# Patient Record
Sex: FEMALE | Race: WHITE | NOT HISPANIC OR LATINO | Employment: OTHER | URBAN - METROPOLITAN AREA
[De-identification: names, ages, dates, MRNs, and addresses within clinical notes are randomized per-mention and may not be internally consistent; named-entity substitution may affect disease eponyms.]

---

## 2017-03-28 ENCOUNTER — OFFICE VISIT (OUTPATIENT)
Dept: ORTHOPEDICS | Facility: CLINIC | Age: 82
End: 2017-03-28

## 2017-03-28 VITALS — WEIGHT: 159.2 LBS | HEIGHT: 63 IN | BODY MASS INDEX: 28.21 KG/M2

## 2017-03-28 DIAGNOSIS — M17.11 ARTHRITIS OF RIGHT KNEE: Primary | ICD-10-CM

## 2017-03-28 ASSESSMENT — ENCOUNTER SYMPTOMS
SINUS CONGESTION: 1
DOUBLE VISION: 0
BLOATING: 0
BLOOD IN STOOL: 0
VOMITING: 0
ABDOMINAL PAIN: 0
SORE THROAT: 0
HEARTBURN: 0
NECK MASS: 0
BOWEL INCONTINENCE: 0
EYE WATERING: 0
JAUNDICE: 0
CONSTIPATION: 0
EYE PAIN: 0
HOARSE VOICE: 0
RECTAL PAIN: 0
EYE REDNESS: 0
TROUBLE SWALLOWING: 0
SINUS PAIN: 0
TASTE DISTURBANCE: 0
RECTAL BLEEDING: 0
NAUSEA: 0
EYE IRRITATION: 0
SMELL DISTURBANCE: 0
DIARRHEA: 1

## 2017-03-28 NOTE — PROGRESS NOTES
HISTORY OF PRESENT ILLNESS:  Patient is a pleasant 87-year-old female who I last saw on 11/2015, 17 months prior to today.  She carries a diagnosis of a valgus deformity of her right knee.  She is here because her daughter-in-law felt that we should reconsider the possibility of doing surgery because she is now in good health and does not want to do surgery later on when she is not in good health.      We reviewed her current situation; her range of motions of this right knee is 15 degrees to 135.  She reports her pain level as 0-1.  She [place] in a [place] rise which she takes the elevator.  She walks around the house with no ambulatory aid.  She does not walk outside with an ambulatory aid other than somebody's arm when needed.      She recently came back from a bird watching experiencing.  When asked if she had trouble on uneven ground, she said when she did, she just grabbed her son's arm.  In addition to this recent trip to [place], she was in [facility] a few weeks prior to that.  In January, she was down in [place] and then further travel to [place].  In December she was in [place].      She freely navigates airplanes, airports and car travel without problems.  She walks on level ground without problems.  Typically when she goes up and down steps, she uses a handrail, but feels that if she has a hand rail, she does not have any difficulty.  She does have confidence in her knee to the extent that it does not interfere with her life.  She has not experienced any falls.      Again, her range of motion is 15 degrees to 135.  She had a valgus deformity which is not passively correctable.  On standing alignment, she has a valgus alignment measuring 13 degrees of anatomic valgus.      I believe that overall I do not have much that I could offer her in regards to improvement in quality of life at this point in time.  Certainly, at her age there are things that could go wrong with surgery or she could end up with a  stiff knee and have less flexibility.      I believe that if she starts to show less confidence in her knee, and/or if she should begin to have falls, then we could re-evaluate the situation.  She also reports that if she started to have a fear of falling, she probably would just use a walker or a  crutch or a cane, none of which are a daily habit for her.      I do not think it would be of value to repeat x-rays at this point in time.      She will return to see if she has increased pain or increased fear of falling.  At that time we could repeat standing alignment films and PA views.  We do not need to have a lateral or an axial view repeated.      Room time 15 minutes, consultation time 12.       Answers for HPI/ROS submitted by the patient on 3/28/2017   General Symptoms: No  Skin Symptoms: No  HENT Symptoms: Yes  EYE SYMPTOMS: Yes  HEART SYMPTOMS: No  LUNG SYMPTOMS: No  INTESTINAL SYMPTOMS: Yes  URINARY SYMPTOMS: No  GYNECOLOGIC SYMPTOMS: No  BREAST SYMPTOMS: No  SKELETAL SYMPTOMS: No  BLOOD SYMPTOMS: No  NERVOUS SYSTEM SYMPTOMS: No  MENTAL HEALTH SYMPTOMS: No  Ear pain: No  Ear discharge: No  Hearing loss: Yes  Tinnitus: No  Nosebleeds: No  Congestion: Yes  Sinus pain: No  Trouble swallowing: No   Voice hoarseness: No  Mouth sores: No  Sore throat: No  Tooth pain: No  Gum tenderness: No  Bleeding gums: No  Change in taste: No  Change in sense of smell: No  Dry mouth: No  Hearing aid used: Yes  Neck lump: No  Eye pain: No  Vision loss: No  Dry eyes: No  Watery eyes: No  Eye bulging: No  Double vision: No  Flashing of lights: No  Spots: No  Floaters: Yes  Redness: No  Crossed eyes: No  Tunnel Vision: No  Yellowing of eyes: No  Eye irritation: No  Heart burn or indigestion: No  Nausea: No  Vomiting: No  Abdominal pain: No  Bloating: No  Constipation: No  Diarrhea: Yes  Blood in stool: No  Black stools: No  Rectal or Anal pain: No  Fecal incontinence: No  Rectal bleeding: No  Yellowing of skin or eyes: No  Vomit  with blood: No  Change in stools: Yes

## 2017-03-28 NOTE — NURSING NOTE
"Reason For Visit:   Chief Complaint   Patient presents with     RECHECK     right knee, wants to discuss surgery       ? No  Occupation retired.  Currently working? No.  Work status? Retired.  Date of injury:   Type of injury:   Date of surgery:   Type of surgery: .  Smoker: No  Request smoking cessation information: No        Pain Assessment  Patient Currently in Pain: Yes  Patient's Stated Pain Goal: No pain  0-10 Pain Scale: 1  Primary Pain Location: Knee  Pain Orientation: Right  Pain Descriptors: Aching  Alleviating Factors: Rest  Aggravating Factors: Stairs    Ht 1.607 m (5' 3.25\")  Wt 72.2 kg (159 lb 3.2 oz)  BMI 27.98 kg/m2       Allergies   Allergen Reactions     Penicillin G      Red and inflamed     Current Outpatient Prescriptions   Medication     aspirin 81 MG tablet     calcium-vitamin D (CALTRATE) 600-400 MG-UNIT per tablet     Alendronate Sodium (FOSAMAX PO)     VITAMIN D, CHOLECALCIFEROL, PO     multivitamin, therapeutic (THERA-VIT) TABS     No current facility-administered medications for this visit.                                                       "

## 2017-03-28 NOTE — MR AVS SNAPSHOT
"              After Visit Summary   3/28/2017    Hailey Alexis    MRN: [id number]           Patient Information     Date Of Birth          2/10/1930        Visit Information        Provider Department      3/28/2017 3:15 PM Hailey Gray MD [facility]        Today's Diagnoses     Arthritis of right knee    -  1       Follow-ups after your visit        Who to contact     Please call your clinic at [phone] to:    Ask questions about your health    Make or cancel appointments    Discuss your medicines    Learn about your test results    Speak to your doctor   If you have compliments or concerns about an experience at your clinic, or if you wish to file a complaint, please contact [facility] Patient Relations at [phone] or email us at [email or web address]         Additional Information About Your Visit        MyChart Information     [employer] [place] is an electronic gateway that provides easy, online access to your medical records. With [employer], you can request a clinic appointment, read your test results, renew a prescription or communicate with your care team.     To sign up for [employer] visit the website at [email or web address]   You will be asked to enter the access code listed below, as well as some personal information. Please follow the directions to create your username and password.     Your access code is: [email or web address]  Expires: 2017  6:30 AM     Your access code will  in 90 days. If you need help or a new code, please contact your [facility] or call [phone] for assistance.        Care EveryWhere ID     This is your Care EveryWhere ID. This could be used by other organizations to access your [place] medical records  [id number]        Your Vitals Were     Height BMI (Body Mass Index)                1.607 m (5' 3.25\") 27.98 kg/m2           Blood Pressure from Last 3 Encounters:   No data " found for BP    Weight from Last 3 Encounters:   No data found for Wt              Today, you had the following     No orders found for display       Primary Care Provider    None Specified       No primary provider on file.        Thank you!     Thank you for choosing [facility]  for your care. Our goal is always to provide you with excellent care. Hearing back from our patients is one way we can continue to improve our services. Please take a few minutes to complete the written survey that you may receive in the mail after your visit with us. Thank you!             Your Updated Medication List - Protect others around you: Learn how to safely use, store and throw away your medicines at www.disposemymeds.org.          This list is accurate as of: 3/28/17 11:59 PM.  Always use your most recent med list.                   Brand Name Dispense Instructions for use    aspirin 81 MG tablet      Take 81 mg by mouth daily       calcium-vitamin D 600-400 MG-UNIT per tablet    CALTRATE     Take 2 tablets by mouth daily       FOSAMAX PO      Take 70 mg by mouth once a week       multivitamin, therapeutic Tabs tablet      Take 1 tablet by mouth daily       VITAMIN D (CHOLECALCIFEROL) PO      Take 1,000 Units by mouth daily

## 2018-06-05 ENCOUNTER — RADIANT APPOINTMENT (OUTPATIENT)
Dept: GENERAL RADIOLOGY | Facility: CLINIC | Age: 83
End: 2018-06-05
Attending: ORTHOPAEDIC SURGERY
Payer: COMMERCIAL

## 2018-06-05 ENCOUNTER — OFFICE VISIT (OUTPATIENT)
Dept: ORTHOPEDICS | Facility: CLINIC | Age: 83
End: 2018-06-05
Payer: COMMERCIAL

## 2018-06-05 ENCOUNTER — TELEPHONE (OUTPATIENT)
Dept: ORTHOPEDICS | Facility: CLINIC | Age: 83
End: 2018-06-05

## 2018-06-05 DIAGNOSIS — M25.561 CHRONIC PAIN OF RIGHT KNEE: ICD-10-CM

## 2018-06-05 DIAGNOSIS — M25.561 CHRONIC PAIN OF RIGHT KNEE: Primary | ICD-10-CM

## 2018-06-05 DIAGNOSIS — M17.11 ARTHRITIS OF RIGHT KNEE: ICD-10-CM

## 2018-06-05 DIAGNOSIS — G89.29 CHRONIC PAIN OF RIGHT KNEE: Primary | ICD-10-CM

## 2018-06-05 DIAGNOSIS — G89.29 CHRONIC PAIN OF RIGHT KNEE: ICD-10-CM

## 2018-06-05 RX ORDER — TRIAMCINOLONE ACETONIDE 40 MG/ML
40 INJECTION, SUSPENSION INTRA-ARTICULAR; INTRAMUSCULAR ONCE
Qty: 1 ML | Refills: 0 | OUTPATIENT
Start: 2018-06-05 | End: 2018-06-05

## 2018-06-05 RX ORDER — LIDOCAINE HYDROCHLORIDE 10 MG/ML
9 INJECTION, SOLUTION INFILTRATION; PERINEURAL ONCE
Qty: 9 ML | Refills: 0 | OUTPATIENT
Start: 2018-06-05 | End: 2018-06-05

## 2018-06-05 RX ORDER — ANTIOX #8/OM3/DHA/EPA/LUT/ZEAX 250-2.5 MG
1 CAPSULE ORAL 2 TIMES DAILY
COMMUNITY
End: 2024-09-25

## 2018-06-05 ASSESSMENT — ENCOUNTER SYMPTOMS
FEVER: 0
FATIGUE: 0
DECREASED APPETITE: 0
BACK PAIN: 0
MUSCLE WEAKNESS: 0
MYALGIAS: 1
POLYDIPSIA: 0
STIFFNESS: 0
ARTHRALGIAS: 1
INCREASED ENERGY: 0
WEIGHT LOSS: 0
POLYPHAGIA: 0
NECK PAIN: 0
MUSCLE CRAMPS: 1
JOINT SWELLING: 0
WEIGHT GAIN: 0
NIGHT SWEATS: 0
ALTERED TEMPERATURE REGULATION: 0
HALLUCINATIONS: 0
CHILLS: 0

## 2018-06-05 NOTE — TELEPHONE ENCOUNTER
Returned patient's call after discussing with Dr. Gray patient's request for injection.  Dr. Gray is willing to see patient today at 1:30 but did explain that this may not relieve her pain. Patient aware and she is willing to try this. Patient scheduled.

## 2018-06-05 NOTE — PROGRESS NOTES
"Orthopedic Surgery Clinic    CHIEF COMPLAINT: Right knee pain, chronic    HISTORY OF PRESENT ILLNESS: 88-year-old female returns to clinic to follow-up right knee pain.  She was last seen in our clinic in March 20, 2017 where overall, she is doing well, had motion approximately  .  She returns today stating that, over the past 2 days she had significant diffuse right knee pain which is now resolved.  She denies any antecedent trauma.  She describes it as her baseline aching/soreness which is activity related, diffusely throughout her right knee, somewhat improved by rest.  She states over the past day, the pain has significantly improved however, she notes she is going on a trip to [place] in 2 days, and wishes for corticosteroid injection so that her knee pain does not limit her activity on her vacation.  Otherwise, she has no other questions or concerns.  She briefly mentions that she is considering surgery as she would like to proceed while she is still \"healthy\".  denies new numbness/tingling/weakness, denies fevers, chills, sob, cp.      REVIEW OF SYSTEMS:  10 point review of systems negative unless noted in HPI    PHYSICAL EXAM:  No acute distress    Right lower extremity: Toes warm well perfused, dorsalis pedis pulse 2+, sensation intact light touch throughout, 5/5 EHL, FHL, GSC, TA, mild tenderness to palpation lateral joint line, no tenderness palpation medially and posteriorly, right knee range of motion 4  to 137 , painless, moderate crepitus, valgus passively corrects, stable to varus valgus stress, stable to AP stress, walks with a right antalgic gait, grossly valgus on exam    IMAGING: Reviewed, imaging demonstrates valgus of right knee measuring approximately 60  on alignment films today, accounting for roughly 1  of progression from her prior long leg alignment films, her arthritis is mostly pronounced at the lateral compartment where it is bone-on-bone, moderate to advanced at the medial " compartment    A/P: 88 year old female with moderate to advanced osteoarthritis of the right knee.    We had an extensive discussion with this patient about further medical management as well as surgical management.  Given that she is leaving for comparison 2 days, we do agree with proceeding with right knee corticosteroid injection.  Wrist benefits alternatives were discussed.    Procedure note: Right knee corticosteroid injection  Verbal and written consent obtained.  Site and side verified with patient.  Risks and benefits alternatives reviewed.  Utilizing a 22-gauge needle, 40 mg of Kenalog and 5 cc 1% lidocaine without epinephrine are injected into the right knee without complication.  Patient tolerated procedure well.  The injection was done by Dr. Gray    Patient will be weightbearing as tolerated, activity as tolerated, avoid activities that cause her pain, follow-up in clinic in July to discuss further medical management versus total knee arthroplasty.    RT: 15 min, CT: 12 min,    Adalid Peng MD  Orthopedic Surgery, PGY4  [phone]    I have personally examined this patient and have reviewed the clinical presentation and progress note with the resident.  I agree with the treatment plan as outlined.  The plan was formulated with the resident on the day of the resident dictation.    Hailey Gray      Answers for HPI/ROS submitted by the patient on 6/5/2018   General Symptoms: Yes  Skin Symptoms: No  HENT Symptoms: No  EYE SYMPTOMS: No  HEART SYMPTOMS: No  LUNG SYMPTOMS: No  INTESTINAL SYMPTOMS: No  URINARY SYMPTOMS: No  GYNECOLOGIC SYMPTOMS: No  BREAST SYMPTOMS: No  SKELETAL SYMPTOMS: Yes  BLOOD SYMPTOMS: No  NERVOUS SYSTEM SYMPTOMS: No  MENTAL HEALTH SYMPTOMS: No  Fever: No  Loss of appetite: No  Weight loss: No  Weight gain: No  Fatigue: No  Night sweats: No  Chills: No  Increased stress: Yes  Excessive hunger: No  Excessive thirst: No  Feeling hot or cold when others believe the temperature  is normal: No  Loss of height: No  Post-operative complications: No  Surgical site pain: Yes  Hallucinations: No  Change in or Loss of Energy: No  Hyperactivity: No  Confusion: No  Back pain: No  Muscle aches: Yes  Neck pain: No  Swollen joints: No  Joint pain: Yes  Bone pain: Yes  Muscle cramps: Yes  Muscle weakness: No  Joint stiffness: No  Bone fracture: No

## 2018-06-05 NOTE — NURSING NOTE
Reason For Visit:   Chief Complaint   Patient presents with     RECHECK     Possible right knee injection       Primary MD: No primary care provider on file.  Referring MD: Self    ?  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  Date of injury: ongoing    Date of surgery: NA  Type of surgery: NA.  Smoker: No  Request smoking cessation information: No    There were no vitals taken for this visit.    Pain Assessment  Patient Currently in Pain: [facility]  [address] [facility] [address]  Dept: [phone]  ______________________________________________________________________________    Patient: Hailey Alexis   : 2/10/1930   MRN: [id number]   2018    INVASIVE PROCEDURE SAFETY CHECKLIST    Date: 2018   Procedure:Right knee injection  Patient Name: Hailey Alexis  MRN: [id number]  YOB: 1930    Action: Complete sections as appropriate. Any discrepancy results in a HARD COPY until resolved.     PRE PROCEDURE:  Patient ID verified with 2 identifiers (name and  or MRN): Yes  Procedure and site verified with patient/designee (when able): Yes  Accurate consent documentation in medical record: Yes  H&P (or appropriate assessment) documented in medical record: NA  H&P must be up to 20 days prior to procedure and updates within 24 hours of procedure as applicable: NA  Relevant diagnostic and radiology test results appropriately labeled and displayed as applicable: Yes  Procedure site(s) marked with provider initials: NA    TIMEOUT:  Time-Out performed immediately prior to starting procedure, including verbal and active participation of all team members addressing the following:Yes  * Correct patient identify  * Confirmed that the correct side and site are marked  * An accurate procedure consent form  * Agreement on the procedure to be done  * Correct patient position  * Relevant images and  results are properly labeled and appropriately displayed  * The need to administer antibiotics or fluids for irrigation purposes during the procedure as applicable   * Safety precautions based on patient history or medication use    DURING PROCEDURE: Verification of correct person, site, and procedures any time the responsibility for care of the patient is transferred to another member of the care team.       The following medications were given:     MEDICATION:  Kenalog 40 mg  ROUTE: intra articular  SITE: right knee  DOSE: 1 ml  LOT #: [id number]  : [employer]  EXPIRATION DATE: 01/2020  NDC#: [address]-1   Was there drug waste? No    MEDICATION:  Lidocaine without epinephrine  ROUTE: intra articular  SITE: right knee  DOSE: 9 ml  LOT #: [id number]  : [employer]  EXPIRATION DATE: 04/22  NDC#: 10612-226-58   Was there drug waste? Yes  Amount of drug waste (mL): 21.  Reason for waste:  Single use vial      Claudine Nogueira ATC  June 5, 2018        Teaching Flowsheet   Relevant Diagnosis: Right knee pain/arthritis  Teaching Topic: Right knee steroid injection      Person(s) involved in teaching:   Patient     Motivation Level:  Asks Questions: Yes  Eager to Learn: Yes  Cooperative: Yes  Receptive (willing/able to accept information): Yes  Any cultural factors/Sikh beliefs that may influence understanding or compliance? No       Patient demonstrates understanding of the following:  Reason for the appointment, diagnosis and treatment plan: Yes  Knowledge of proper use of medications and conditions for which they are ordered (with special attention to potential side effects or drug interactions): Yes  Which situations necessitate calling provider and whom to contact: Yes       Teaching Concerns Addressed:        Proper use and care of NA (medical equip, care aids, etc.): NA  Nutritional needs and diet plan: NA  Pain management techniques: NA  Wound Care: NA  How and/when to access  community resources: NA     Instructional Materials Used/Given: verbal    Claudine Nogueira, ATC

## 2018-06-05 NOTE — MR AVS SNAPSHOT
After Visit Summary   2018    Hailey Alexis    MRN: [id number]           Patient Information     Date Of Birth          2/10/1930        Visit Information        Provider Department      2018 1:30 PM Hailey Gray MD [facility]        Today's Diagnoses     Chronic pain of right knee    -  1    Arthritis of right knee           Follow-ups after your visit        Follow-up notes from your care team     Return in about 1 month (around 2018).      Your next 10 appointments already scheduled     2018 11:30 AM CDT   (Arrive by 11:15 AM)   RETURN KNEE with Hailey Gray MD   [facility] ([facility])    [address] [address] [phone]              Who to contact     Please call your clinic at [phone] to:    Ask questions about your health    Make or cancel appointments    Discuss your medicines    Learn about your test results    Speak to your doctor            Additional Information About Your Visit        MyChart Information     Pressgram is an electronic gateway that provides easy, online access to your medical records. With Pressgram, you can request a clinic appointment, read your test results, renew a prescription or communicate with your care team.     To sign up for [employer] visit the website at [email or web address]   You will be asked to enter the access code listed below, as well as some personal information. Please follow the directions to create your username and password.     Your access code is: [email or web address]  Expires: 2018  1:14 PM     Your access code will  in 90 days. If you need help or a new code, please contact your [facility] or call [phone] for assistance.        Care EveryWhere ID     This is your Care EveryWhere ID. This could be used by other organizations to access your [facility]  records  [id number]         Blood Pressure from Last 3 Encounters:   No data found for BP    Weight from Last 3 Encounters:   03/28/17 72.2 kg (159 lb 3.2 oz)   11/03/15 71.1 kg (156 lb 12.8 oz)              We Performed the Following     Large Joint/Bursa injection and/or drainage - Unilateral (Shoulder, Knee) [27000]          Today's Medication Changes          These changes are accurate as of 6/5/18 11:59 PM.  If you have any questions, ask your nurse or doctor.               Start taking these medicines.        Dose/Directions    lidocaine 1 % injection   Used for:  Arthritis of right knee   Started by:  Hailey Gray MD        Dose:  9 mL   9 mLs by INTRA-ARTICULAR route once for 1 dose   Quantity:  9 mL   Refills:  0       triamcinolone acetonide 40 MG/ML injection   Commonly known as:  KENALOG   Used for:  Arthritis of right knee   Started by:  Hailey Gray MD        Dose:  40 mg   1 mL (40 mg) by INTRA-ARTICULAR route once for 1 dose   Quantity:  1 mL   Refills:  0            Where to get your medicines      Some of these will need a paper prescription and others can be bought over the counter.  Ask your nurse if you have questions.     You don't need a prescription for these medications     lidocaine 1 % injection    triamcinolone acetonide 40 MG/ML injection                Primary Care Provider    None Specified       No primary provider on file.        Equal Access to Services     [facility]: Hadii ryne Santos, waaxda luqadaha, qaybta kaalmada adeeri, sunni galloway . So [facility] [phone].    ATENCIÓN: Si habla español, tiene a espinoza disposición servicios gratuitos de asistencia lingüística. Llame al [phone].    We comply with applicable federal civil rights laws and [place] laws. We do not discriminate on the basis of race, color, national origin, age, disability, sex, sexual orientation, or gender identity.            Thank you!     Thank  you for choosing [facility]  for your care. Our goal is always to provide you with excellent care. Hearing back from our patients is one way we can continue to improve our services. Please take a few minutes to complete the written survey that you may receive in the mail after your visit with us. Thank you!             Your Updated Medication List - Protect others around you: Learn how to safely use, store and throw away your medicines at www.disposemymeds.org.          This list is accurate as of 6/5/18 11:59 PM.  Always use your most recent med list.                   Brand Name Dispense Instructions for use Diagnosis    aspirin 81 MG tablet      Take 81 mg by mouth daily        calcium-vitamin D 600-400 MG-UNIT per tablet    CALTRATE     Take 2 tablets by mouth daily        FOSAMAX PO      Take 70 mg by mouth once a week        lidocaine 1 % injection     9 mL    9 mLs by INTRA-ARTICULAR route once for 1 dose    Arthritis of right knee       multivitamin, therapeutic Tabs tablet      Take 1 tablet by mouth daily        PRESERVISION AREDS 2 Caps           triamcinolone acetonide 40 MG/ML injection    KENALOG    1 mL    1 mL (40 mg) by INTRA-ARTICULAR route once for 1 dose    Arthritis of right knee       VITAMIN D (CHOLECALCIFEROL) PO      Take 1,000 Units by mouth daily

## 2018-06-05 NOTE — TELEPHONE ENCOUNTER
[facility] Call Center    Phone Message    May a detailed message be left on voicemail: yes    Reason for Call: Other: Pt wants to get in this week for a rt knee inj. before leaving [place]. Nothing in sports until next friday. Nothing for Dr. Gray until August.      Action Taken: Message routed to:  [facility] Orthopedics

## 2018-06-05 NOTE — LETTER
"6/5/2018     RE: Hailey Alexis  [address]  [facility] [address]     Dear Colleague,    Thank you for referring your patient, Hailey Alexis, to the [facility] at [facility]. Please see a copy of my visit note below.    Orthopedic Surgery Clinic    CHIEF COMPLAINT: Right knee pain, chronic    HISTORY OF PRESENT ILLNESS: 88-year-old female returns to clinic to follow-up right knee pain.  She was last seen in our clinic in March 20, 2017 where overall, she is doing well, had motion approximately  .  She returns today stating that, over the past 2 days she had significant diffuse right knee pain which is now resolved.  She denies any antecedent trauma.  She describes it as her baseline aching/soreness which is activity related, diffusely throughout her right knee, somewhat improved by rest.  She states over the past day, the pain has significantly improved however, she notes she is going on a trip to [place] in 2 days, and wishes for corticosteroid injection so that her knee pain does not limit her activity on her vacation.  Otherwise, she has no other questions or concerns.  She briefly mentions that she is considering surgery as she would like to proceed while she is still \"healthy\".  denies new numbness/tingling/weakness, denies fevers, chills, sob, cp.      REVIEW OF SYSTEMS:  10 point review of systems negative unless noted in HPI    PHYSICAL EXAM:  No acute distress    Right lower extremity: Toes warm well perfused, dorsalis pedis pulse 2+, sensation intact light touch throughout, 5/5 EHL, FHL, GSC, TA, mild tenderness to palpation lateral joint line, no tenderness palpation medially and posteriorly, right knee range of motion 4  to 137 , painless, moderate crepitus, valgus passively corrects, stable to varus valgus stress, stable to AP stress, walks with a right antalgic gait, grossly valgus on exam    IMAGING: Reviewed, imaging " demonstrates valgus of right knee measuring approximately 60  on alignment films today, accounting for roughly 1  of progression from her prior long leg alignment films, her arthritis is mostly pronounced at the lateral compartment where it is bone-on-bone, moderate to advanced at the medial compartment    A/P: 88 year old female with moderate to advanced osteoarthritis of the right knee.    We had an extensive discussion with this patient about further medical management as well as surgical management.  Given that she is leaving for [place] 2 days, we do agree with proceeding with right knee corticosteroid injection.  Wrist benefits alternatives were discussed.    Procedure note: Right knee corticosteroid injection  Verbal and written consent obtained.  Site and side verified with patient.  Risks and benefits alternatives reviewed.  Utilizing a 22-gauge needle, 40 mg of Kenalog and 5 cc 1% lidocaine without epinephrine are injected into the right knee without complication.  Patient tolerated procedure well.  The injection was done by Dr. Gray    Patient will be weightbearing as tolerated, activity as tolerated, avoid activities that cause her pain, follow-up in clinic in July to discuss further medical management versus total knee arthroplasty.    RT: 15 min, CT: 12 min,    Adalid Peng MD  Orthopedic Surgery, PGY4  [phone]    I have personally examined this patient and have reviewed the clinical presentation and progress note with the resident.  I agree with the treatment plan as outlined.  The plan was formulated with the resident on the day of the resident dictation.    Hailey Gray      Answers for HPI/ROS submitted by the patient on 6/5/2018   General Symptoms: Yes  Skin Symptoms: No  HENT Symptoms: No  EYE SYMPTOMS: No  HEART SYMPTOMS: No  LUNG SYMPTOMS: No  INTESTINAL SYMPTOMS: No  URINARY SYMPTOMS: No  GYNECOLOGIC SYMPTOMS: No  BREAST SYMPTOMS: No  SKELETAL SYMPTOMS: Yes  BLOOD SYMPTOMS:  No  NERVOUS SYSTEM SYMPTOMS: No  MENTAL HEALTH SYMPTOMS: No  Fever: No  Loss of appetite: No  Weight loss: No  Weight gain: No  Fatigue: No  Night sweats: No  Chills: No  Increased stress: Yes  Excessive hunger: No  Excessive thirst: No  Feeling hot or cold when others believe the temperature is normal: No  Loss of height: No  Post-operative complications: No  Surgical site pain: Yes  Hallucinations: No  Change in or Loss of Energy: No  Hyperactivity: No  Confusion: No  Back pain: No  Muscle aches: Yes  Neck pain: No  Swollen joints: No  Joint pain: Yes  Bone pain: Yes  Muscle cramps: Yes  Muscle weakness: No  Joint stiffness: No  Bone fracture: No    Again, thank you for allowing me to participate in the care of your patient.      Sincerely,    Hailey Gray MD

## 2018-07-31 ENCOUNTER — OFFICE VISIT (OUTPATIENT)
Dept: ORTHOPEDICS | Facility: CLINIC | Age: 83
End: 2018-07-31
Payer: COMMERCIAL

## 2018-07-31 VITALS — BODY MASS INDEX: 26.48 KG/M2 | HEIGHT: 64 IN | WEIGHT: 155.1 LBS

## 2018-07-31 DIAGNOSIS — M17.11 PRIMARY OSTEOARTHRITIS OF RIGHT KNEE: Primary | ICD-10-CM

## 2018-07-31 DIAGNOSIS — M17.11 ARTHRITIS OF RIGHT KNEE: ICD-10-CM

## 2018-07-31 NOTE — PROGRESS NOTES
S: Blanca is an 88-year-old female who presents today for follow-up after right knee injection 7 weeks ago.  After having the injection she had significant pain relief and was able to travel in [place] without difficulty.  She did have some soreness at the end of long days of walking but otherwise did very well.  This was her first injection into the knee.  She denies any sensation of instability and feels that she can trust her knee at this time.  The cortisone shot is still working well.  She reports no numbness tingling in the right lower extremity no increased swelling, fevers, chills.     Physical exam: generally well-appearing in no acute distress    Exam of the right knee: tolerates range of motion from 0-120  without pain, she has no crepitus she is stable to varus, valgus, as well as anterior and posterior drawer testing.     Review of her x-rays: from June show lateral sided arthritis of the right knee with valgus alignment 13 .  She has minimal joint space narrowing in the medial compartment and patellofemoral compartment.     Assessment and plan:  88-year-old female with right valgus knee, osteoarthritis of lateral compartment.   Discussed that since this first corticosteroid injection worked well she could continue to get them every 3 months.   We did discuss that given her deformity she may develop instability symptoms.  This in combination or aside from pain may be a reason to pursue total knee arthroplasty in the future.  Today we will give her a prescription for physical therapy to strengthen her right knee.  Additionally we her information so that she can attend the joint education class to learn more about total knee arthroplasty  She is welcome to come in to the walk-in clinic for future knee injections but please notify Dr. Gray to assess future plans.    I have personally examined this patient and have reviewed the clinical presentation and progress note with the resident.  I agree with the  treatment plan as outlined.  The plan was formulated with the resident on the day of the resident dictation.      Hailey Shane MD   PGY-4 Orthopaedic Surgery     Answers for HPI/ROS submitted by the patient on 7/22/2018   General Symptoms: No  Skin Symptoms: No  HENT Symptoms: No  EYE SYMPTOMS: No  HEART SYMPTOMS: No  LUNG SYMPTOMS: No  INTESTINAL SYMPTOMS: No  URINARY SYMPTOMS: No  GYNECOLOGIC SYMPTOMS: No  BREAST SYMPTOMS: No  SKELETAL SYMPTOMS: No  BLOOD SYMPTOMS: No  NERVOUS SYSTEM SYMPTOMS: No  MENTAL HEALTH SYMPTOMS: No  PHQ-2 Score: 1

## 2018-07-31 NOTE — LETTER
7/31/2018       RE: Hailey Alexis  [address]  [facility] [address]     Dear Colleague,    Thank you for referring your patient, Hailey Alexis, [facility]. Please see a copy of my visit note below.    S: Blanca is an 88-year-old female who presents today for follow-up after right knee injection 7 weeks ago.  After having the injection she had significant pain relief and was able to travel in [place] without difficulty.  She did have some soreness at the end of long days of walking but otherwise did very well.  This was her first injection into the knee.  She denies any sensation of instability and feels that she can trust her knee at this time.  The cortisone shot is still working well.  She reports no numbness tingling in the right lower extremity no increased swelling, fevers, chills.     Physical exam: generally well-appearing in no acute distress    Exam of the right knee: tolerates range of motion from 0-120  without pain, she has no crepitus she is stable to varus, valgus, as well as anterior and posterior drawer testing.     Review of her x-rays: from June show lateral sided arthritis of the right knee with valgus alignment 13  .  She has minimal joint space narrowing in the medial compartment and patellofemoral compartment.     Assessment and plan:  88-year-old female with right valgus knee, osteoarthritis of lateral compartment.   Discussed that since this first corticosteroid injection worked well she could continue to get them every 3 months.   We did discuss that given her deformity she may develop instability symptoms.  This in combination or aside from pain may be a reason to pursue total knee arthroplasty in the future.  Today we will give her a prescription for physical therapy to strengthen her right knee.  Additionally we her information so that she can attend the joint education class to learn more about  total knee arthroplasty  She is welcome to come in to the walk-in clinic for future knee injections but please notify Dr. Gray to assess future plans.    I have personally examined this patient and have reviewed the clinical presentation and progress note with the resident.  I agree with the treatment plan as outlined.  The plan was formulated with the resident on the day of the resident dictation.      Hailey Shane MD   PGY-4 Orthopaedic Surgery

## 2018-07-31 NOTE — MR AVS SNAPSHOT
"              After Visit Summary   7/31/2018    Hailey Alexis    MRN: [id number]           Patient Information     Date Of Birth          2/10/1930        Visit Information        Provider Department      7/31/2018 11:30 AM Hailey Gray [facility]        Today's Diagnoses     Primary osteoarthritis of right knee    -  1    Arthritis of right knee           Follow-ups after your visit        Additional Services     PHYSICAL THERAPY REFERRAL (Internal)       Physical Therapy Referral    Schedule with either Luis E LANG or Ronnell Bell                  Follow-up notes from your care team     Return if symptoms worsen or fail to improve.      Who to contact     Please call your clinic at [phone] to:    Ask questions about your health    Make or cancel appointments    Discuss your medicines    Learn about your test results    Speak to your doctor            Additional Information About Your Visit        MyChart Information     Real[employer] gives you secure access to your electronic health record. If you see a primary care provider, you can also send messages to your care team and make appointments. If you have questions, please call your primary care clinic.  If you do not have a primary care provider, please call [phone] and they will assist you.      Realie is an electronic gateway that provides easy, online access to your medical records. With Real[employer], you can request a clinic appointment, read your test results, renew a prescription or communicate with your care team.     To access your existing account, please contact your [facility] or call [phone] for assistance.        Care EveryWhere ID     This is your Care EveryWhere ID. This could be used by other organizations to access your [facility] medical records  [id number]        Your Vitals Were     Height BMI (Body Mass Index)                1.626 m (5' 4\") 26.62 kg/m2           Blood Pressure from " Last 3 Encounters:   No data found for BP    Weight from Last 3 Encounters:   07/31/18 70.4 kg (155 lb 1.6 oz)   03/28/17 72.2 kg (159 lb 3.2 oz)   11/03/15 71.1 kg (156 lb 12.8 oz)              We Performed the Following     PHYSICAL THERAPY REFERRAL (Internal)        Primary Care Provider    None Specified       No primary provider on file.        Equal Access to Services     [facility]: Hadii ryne ku hadalejandrinao Socalixtoali, waaxda luqadaha, qaybta kaalmada dequanfinada, sunni raymond kristinaradha estradadevinkiran galloway . So [facility] [phone].    ATENCIÓN: Si habla valery, tiene a espinoza disposición servicios gratuitos de asistencia lingüística. Llame al [phone].    We comply with applicable federal civil rights laws and [place] laws. We do not discriminate on the basis of race, color, national origin, age, disability, sex, sexual orientation, or gender identity.            Thank you!     Thank you for choosing [facility]  for your care. Our goal is always to provide you with excellent care. Hearing back from our patients is one way we can continue to improve our services. Please take a few minutes to complete the written survey that you may receive in the mail after your visit with us. Thank you!             Your Updated Medication List - Protect others around you: Learn how to safely use, store and throw away your medicines at www.disposemymeds.org.          This list is accurate as of 7/31/18  7:17 PM.  Always use your most recent med list.                   Brand Name Dispense Instructions for use Diagnosis    aspirin 81 MG tablet      Take 81 mg by mouth daily        calcium-vitamin D 600-400 MG-UNIT per tablet    CALTRATE     Take 2 tablets by mouth daily        FOSAMAX PO      Take 70 mg by mouth once a week        multivitamin, therapeutic Tabs tablet      Take 1 tablet by mouth daily        PRESERVISION AREDS 2 Caps           VITAMIN D (CHOLECALCIFEROL) PO      Take 1,000 Units by mouth daily

## 2018-08-15 ENCOUNTER — THERAPY VISIT (OUTPATIENT)
Dept: PHYSICAL THERAPY | Facility: CLINIC | Age: 83
End: 2018-08-15
Attending: ORTHOPAEDIC SURGERY
Payer: COMMERCIAL

## 2018-08-15 DIAGNOSIS — M17.11 PRIMARY OSTEOARTHRITIS OF RIGHT KNEE: Primary | ICD-10-CM

## 2018-08-15 PROCEDURE — 97161 PT EVAL LOW COMPLEX 20 MIN: CPT | Mod: GP | Performed by: PHYSICAL THERAPIST

## 2018-08-15 PROCEDURE — 97110 THERAPEUTIC EXERCISES: CPT | Mod: GP | Performed by: PHYSICAL THERAPIST

## 2018-08-15 NOTE — MR AVS SNAPSHOT
After Visit Summary   8/15/2018    Hailey Alexis    MRN: [id number]           Patient Information     Date Of Birth          2/10/1930        Visit Information        Provider Department      8/15/2018 4:30 PM Ronnell Bell PT [facility] [employer] [place]        Today's Diagnoses     Primary osteoarthritis of right knee    -  1       Follow-ups after your visit        Your next 10 appointments already scheduled     Aug 29, 2018  2:20 PM CDT   ELKE Extremity with Ronnell Bell PT   [facility] [employer] [place] ([address] [id number] [phone]              Who to contact     If you have questions or need follow up information about today's clinic visit or your schedule please contact [employer] [facility] [place] directly at [phone].  Normal or non-critical lab and imaging results will be communicated to you by [employer], letter or phone within 4 business days after the clinic has received the results. If you do not hear from us within 7 days, please contact the clinic through [employer] or phone. If you have a critical or abnormal lab result, we will notify you by phone as soon as possible.  Submit refill requests through [employer] or call your pharmacy and they will forward the refill request to us. Please allow 3 business days for your refill to be completed.          Additional Information About Your Visit        MyChart Information     [employer] gives you secure access to your electronic health record. If you see a primary care provider, you can also send messages to your care team and make appointments. If you have questions, please call your primary care clinic.  If you do not have a primary care provider, please call [phone] and they will assist you.        Care EveryWhere ID     This is your Care EveryWhere ID. This could be used by other organizations to access your [facility]  records  [id number]         Blood Pressure from Last 3 Encounters:   No data found for BP    Weight from Last 3 Encounters:   07/31/18 70.4 kg (155 lb 1.6 oz)   03/28/17 72.2 kg (159 lb 3.2 oz)   11/03/15 71.1 kg (156 lb 12.8 oz)              We Performed the Following     HC PT EVAL, LOW COMPLEXITY     THERAPEUTIC EXERCISES        Primary Care Provider    None Specified       No primary provider on file.        Equal Access to Services     [facility]: Hadii aad ku hadasho Soomaali, waaxda luqadaha, qaybta kaalmada adeegyada, sunni galloway . So [place] [phone].    ATENCIÓN: Si jeyla espbrian, tiene a espinoza disposición servicios gratuitos de asistencia lingüística. [facility] [phone].    We comply with applicable federal civil rights laws and [place] laws. We do not discriminate on the basis of race, color, national origin, age, disability, sex, sexual orientation, or gender identity.            Thank you!     Thank you for choosing [facility] [place]  for your care. Our goal is always to provide you with excellent care. Hearing back from our patients is one way we can continue to improve our services. Please take a few minutes to complete the written survey that you may receive in the mail after your visit with us. Thank you!             Your Updated Medication List - Protect others around you: Learn how to safely use, store and throw away your medicines at www.disposemymeds.org.          This list is accurate as of 8/15/18 11:59 PM.  Always use your most recent med list.                   Brand Name Dispense Instructions for use Diagnosis    aspirin 81 MG tablet      Take 81 mg by mouth daily        calcium-vitamin D 600-400 MG-UNIT per tablet    CALTRATE     Take 2 tablets by mouth daily        FOSAMAX PO      Take 70 mg by mouth once a week        multivitamin, therapeutic Tabs tablet      Take 1 tablet by mouth daily        PRESERVISION AREDS 2 Caps            VITAMIN D (CHOLECALCIFEROL) PO      Take 1,000 Units by mouth daily

## 2018-08-15 NOTE — PROGRESS NOTES
Physical Therapy Initial Examination/Evaluation  August 15, 2018    Hailey Alexis is a 88 year old female referred to physical therapy by Shantel Gray MD for treatment of R knee OA with Precautions/Restrictions/MD instructions R knee OA    Therapist Impression:   Jenn presents in good status with the above condition.  At this point, we are going to couple strength training with her current aerobics program.  We will emphasize OKC exercises to limit stress on the knee and consider light/isometric CKC exercises in future.    Subjective:  DOI/onset: 7/31/2018 DOS: none  Acute Injury or Gradual Onset?: Gradual injury over time  Mechanism of Injury: OA  Related PMH: None Previous Treatment: Corticosteroid injection Effect of prior treatment: good  Imaging: x-ray  Chief Complaint/Functional Limitations:   Stairs and standing for 1 hours and see below in therapy evaluation codes   Pain: rest 0 /10, activity 8/10 Location: medial/lateral Frequency: Intermittent Described as: sharp Alleviated by: Pain relieving gel Progression of Symptoms: Gradually getting better. Time of day when pain is worse: Activity related  Sleeping: No issues/uninterrupted   Occupation: Artist  Job duties: prolonged standing, travelling  Current HEP/exercise regimen: walking  Patient's goals are see chief complaints     Other pertinent PMH/Red Flags: Osteoporosis, Osteoarthritis   Barriers at home/work: None as reported by patient  Pertinent Surgical History: Wrist  Medications: Bone density  General health as reported by patient: good  Return to MD:  prn    KNEE EVALUATION    Static Posture  Knee valgus R    Dynamic Movement Screen  Single leg stance observations: Difficulty with balance eyes open R  Double limb squat observations: Knee valgus and Impaired weight distribution  Single limb squat observations: Not assessed  Gait: knee valgus and decreased stance time on R    Range of Motion     Knee ROM Extension Flexion   Left wnl wnl   Right  wnl wnl      Flexibility Quadriceps   Left mild   Right moderate     Hip and Knee Strength   MMT Hip Abduction Hip Extension Hip ER Knee Flexion   Left 4-/5 4-/5 4+/5  na/5   Right 3+/5 3+/5 4/5 na/5     Knee MMT Quadriceps set Straight Leg Raise   Left Good Able   Right Good Able     Knee ligaments and meniscus: Knee effusion trace L    Patellofemoral assessment: Lateral patellar tilt B and Lateral displacement B    Assessment/Plan:  Patient is a 88 year old female with right side knee complaints.    Patient has the following significant findings with corresponding treatment plan.                Diagnosis 1:  R knee OA  Pain -  hot/cold therapy, splint/taping/bracing/orthotics, self management, education and home program  Decreased strength - therapeutic exercise and therapeutic activities  Impaired muscle performance - neuro re-education  Impaired posture - neuro re-education    Therapy Evaluation Codes:   1) History comprised of:   Personal factors that impact the plan of care:      None.    Comorbidity factors that impact the plan of care are:      Osteoarthritis.     Medications impacting care: None.  2) Examination of Body Systems comprised of:   Body structures and functions that impact the plan of care:      Knee.   Activity limitations that impact the plan of care are:      Squatting/kneeling and Stairs.  3) Clinical presentation characteristics are:   Stable/Uncomplicated.  4) Decision-Making    Low complexity using standardized patient assessment instrument and/or measureable assessment of functional outcome.  Cumulative Therapy Evaluation is: Low complexity.    Previous and current functional limitations:  (See Goal Flow Sheet for this information)    Short term and Long term goals: (See Goal Flow Sheet for this information)     Communication ability:  Patient appears to be able to clearly communicate and understand verbal and written communication and follow directions correctly.  Treatment Explanation  - The following has been discussed with the patient:   RX ordered/plan of care  Anticipated outcomes  Possible risks and side effects  This patient would benefit from PT intervention to resume normal activities.   Rehab potential is good.    Frequency:  2 X a month, once daily  Duration:  for 2 months  Discharge Plan:  Achieve all LTG.  Independent in home treatment program.  Reach maximal therapeutic benefit.    Please refer to the daily flowsheet for treatment today, total treatment time and time spent performing 1:1 timed codes.     Please refer to the daily flowsheet for treatment today, total treatment time and time spent performing 1:1 timed codes.

## 2018-08-17 ASSESSMENT — ACTIVITIES OF DAILY LIVING (ADL)
AS_A_RESULT_OF_YOUR_KNEE_INJURY,_HOW_WOULD_YOU_RATE_YOUR_CURRENT_LEVEL_OF_DAILY_ACTIVITY?: NEARLY NORMAL
STIFFNESS: I HAVE THE SYMPTOM BUT IT DOES NOT AFFECT MY ACTIVITY
RAW_SCORE: 55
KNEEL ON THE FRONT OF YOUR KNEE: ACTIVITY IS MINIMALLY DIFFICULT
HOW_WOULD_YOU_RATE_THE_CURRENT_FUNCTION_OF_YOUR_KNEE_DURING_YOUR_USUAL_DAILY_ACTIVITIES_ON_A_SCALE_FROM_0_TO_100_WITH_100_BEING_YOUR_LEVEL_OF_KNEE_FUNCTION_PRIOR_TO_YOUR_INJURY_AND_0_BEING_THE_INABILITY_TO_PERFORM_ANY_OF_YOUR_USUAL_DAILY_ACTIVITIES?: 80
STAND: ACTIVITY IS MINIMALLY DIFFICULT
KNEE_ACTIVITY_OF_DAILY_LIVING_SUM: 55
SIT WITH YOUR KNEE BENT: ACTIVITY IS NOT DIFFICULT
WEAKNESS: I DO NOT HAVE THE SYMPTOM
KNEE_ACTIVITY_OF_DAILY_LIVING_SCORE: 78.57
PAIN: THE SYMPTOM AFFECTS MY ACTIVITY SEVERELY
SWELLING: I DO NOT HAVE THE SYMPTOM
GO DOWN STAIRS: ACTIVITY IS SOMEWHAT DIFFICULT
HOW_WOULD_YOU_RATE_THE_OVERALL_FUNCTION_OF_YOUR_KNEE_DURING_YOUR_USUAL_DAILY_ACTIVITIES?: NEARLY NORMAL
GO UP STAIRS: ACTIVITY IS SOMEWHAT DIFFICULT
WALK: ACTIVITY IS MINIMALLY DIFFICULT
RISE FROM A CHAIR: ACTIVITY IS MINIMALLY DIFFICULT
SQUAT: ACTIVITY IS MINIMALLY DIFFICULT
GIVING WAY, BUCKLING OR SHIFTING OF KNEE: I HAVE THE SYMPTOM BUT IT DOES NOT AFFECT MY ACTIVITY
LIMPING: I DO NOT HAVE THE SYMPTOM

## 2018-08-28 NOTE — NURSING NOTE
"Reason For Visit:   Chief Complaint   Patient presents with     Right Knee - RECHECK       Primary MD: No primary care provider on file.  Referring MD: self    ?  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  Date of injury: ongoing     Date of surgery: NA  Type of surgery: NA.  Smoker: No  Request smoking cessation information    Ht 1.626 m (5' 4\")  Wt 70.4 kg (155 lb 1.6 oz)  BMI 26.62 kg/m2    Pain Assessment  Patient Currently in Pain: Denies  " Closure 3 Information: This tab is for additional flaps and grafts above and beyond our usual structured repairs.  Please note if you enter information here it will not currently bill and you will need to add the billing information manually.

## 2018-08-29 ENCOUNTER — THERAPY VISIT (OUTPATIENT)
Dept: PHYSICAL THERAPY | Facility: CLINIC | Age: 83
End: 2018-08-29
Payer: COMMERCIAL

## 2018-08-29 DIAGNOSIS — M17.11 PRIMARY OSTEOARTHRITIS OF RIGHT KNEE: ICD-10-CM

## 2018-08-29 PROCEDURE — 97110 THERAPEUTIC EXERCISES: CPT | Mod: GP | Performed by: PHYSICAL THERAPIST

## 2018-09-19 ENCOUNTER — THERAPY VISIT (OUTPATIENT)
Dept: PHYSICAL THERAPY | Facility: CLINIC | Age: 83
End: 2018-09-19
Payer: COMMERCIAL

## 2018-09-19 DIAGNOSIS — M17.11 PRIMARY OSTEOARTHRITIS OF RIGHT KNEE: ICD-10-CM

## 2018-09-19 PROCEDURE — 97530 THERAPEUTIC ACTIVITIES: CPT | Mod: GP | Performed by: PHYSICAL THERAPIST

## 2018-09-19 PROCEDURE — 97110 THERAPEUTIC EXERCISES: CPT | Mod: GP | Performed by: PHYSICAL THERAPIST

## 2018-10-17 ENCOUNTER — THERAPY VISIT (OUTPATIENT)
Dept: PHYSICAL THERAPY | Facility: CLINIC | Age: 83
End: 2018-10-17
Payer: COMMERCIAL

## 2018-10-17 DIAGNOSIS — M17.11 PRIMARY OSTEOARTHRITIS OF RIGHT KNEE: ICD-10-CM

## 2018-10-17 PROCEDURE — 97110 THERAPEUTIC EXERCISES: CPT | Mod: GP | Performed by: PHYSICAL THERAPIST

## 2018-10-17 PROCEDURE — 97530 THERAPEUTIC ACTIVITIES: CPT | Mod: GP | Performed by: PHYSICAL THERAPIST

## 2018-10-17 NOTE — PROGRESS NOTES
PROGRESS REPORT    Hailey has been in therapy from August 15, 2018 to Oct 17, 2018 for treatment of R knee OA.    Therapist Impression:  Jenn continues to report great compliance to HEP and has demonstrated improvements in pain, function and strength since initial visit. There are remaining strength deficits in knee extension, which were addressed today with the performance/techniques of exercises targeting the quads. Jenn will follow up in ~1 month to retest strength and modify exercises as necessary. Will continue to benefit from a focus on strength of the knee and proximal hip, as well as pain biking for joint mobility.    Subjective:  Feeling that walking continues to improve, still using hiking poles for long distance   Starting to increase repetitions of squat holds  No pain at rest  Still doing exercises 3x/week    Objective:        HHD Knee Extension at 70 deg   Left   Trial 1: 37  Trial 2: 38  Pain: -   Right Trial 1: 24  Trial 2: 24  Pain: -   65% of uninvolved      HHD Hip Abduction   Left   Trial 1: 12  Trial 2: 13  Pain: -   Right Trial 1: 11  Trial 2: 12  Pain: -   92% of uninvolved    Range of Motion      Knee ROM Extension Flexion   Left wnl wnl   Right wnl wnl         ASSESSMENT/PLAN  Updated problem list and treatment plan: The encounter diagnosis was Primary osteoarthritis of right knee. Decreased strength - HEP  Impaired balance - HEP  STG/LTGs have been met or progress has been made towards goals:  Yes (See Goal flow sheet completed today.)  Assessment of Progress: The patient's condition is improving.  Self Management Plans:  Patient has been instructed in a home treatment program.  Patient  has been instructed in self management of symptoms.  I have re-evaluated this patient and find that the nature, scope, duration and intensity of the therapy is appropriate for the medical condition of the patient.  Hailey continues to require the following intervention to meet STG and LTG's:   PT    Recommendations:  This patient would benefit from continued therapy.     Frequency:  1 X a month, once daily  Duration:  for 2 months              Please refer to the daily flowsheet for treatment today, total treatment time and time spent performing 1:1 timed codes.

## 2018-10-17 NOTE — MR AVS SNAPSHOT
After Visit Summary   10/17/2018    Hailey Alexis    MRN: [id number]           Patient Information     Date Of Birth          2/10/1930        Visit Information        Provider Department      10/17/2018 11:00 AM Maribel Leija [facility]        Today's Diagnoses     Primary osteoarthritis of right knee           Follow-ups after your visit        Your next 10 appointments already scheduled     Nov 14, 2018 11:00 AM CST   [facility])    [address] [address] [phone]              Who to contact     If you have questions or need follow up information about today's clinic visit or your schedule please contact [facility] directly at [phone].  Normal or non-critical lab and imaging results will be communicated to you by MyChart, letter or phone within 4 business days after the clinic has received the results. If you do not hear from us within 7 days, please contact the clinic through Vehrity[employer] or phone. If you have a critical or abnormal lab result, we will notify you by phone as soon as possible.  Submit refill requests through [employer] or call your pharmacy and they will forward the refill request to us. Please allow 3 business days for your refill to be completed.          Additional Information About Your Visit        MyChart Information     [employer] gives you secure access to your electronic health record. If you see a primary care provider, you can also send messages to your care team and make appointments. If you have questions, please call your primary care clinic.  If you do not have a primary care provider, please call [phone] and they will assist you.        Care EveryWhere ID     This is your Care EveryWhere ID. This could be used by other organizations to access your [facility]  records  [id number]         Blood Pressure from Last 3 Encounters:   No data found for BP    Weight from Last 3 Encounters:   07/31/18 70.4 kg (155 lb 1.6 oz)   03/28/17 72.2 kg (159 lb 3.2 oz)   11/03/15 71.1 kg (156 lb 12.8 oz)              We Performed the Following     THERAPEUTIC ACTIVITIES     THERAPEUTIC EXERCISES        Primary Care Provider Fax #    Physician No Ref-Primary [phone]       No address on file        Equal Access to Services     SUZANNA DIAZ : Hadii aad ku hadasho Soomaali, waaxda luqadaha, qaybta kaalmada adeegyada, waxay mitchin paige galloway . So [facility] [phone].    ATENCIÓN: Si habla español, tiene a espinoza disposición servicios gratuitos de asistencia lingüística. [facility] [phone].    We comply with applicable federal civil rights laws and [place] laws. We do not discriminate on the basis of race, color, national origin, age, disability, sex, sexual orientation, or gender identity.            Thank you!     Thank you for choosing [facility] [place]  for your care. Our goal is always to provide you with excellent care. Hearing back from our patients is one way we can continue to improve our services. Please take a few minutes to complete the written survey that you may receive in the mail after your visit with us. Thank you!             Your Updated Medication List - Protect others around you: Learn how to safely use, store and throw away your medicines at www.disposemymeds.org.          This list is accurate as of 10/17/18 11:45 AM.  Always use your most recent med list.                   Brand Name Dispense Instructions for use Diagnosis    aspirin 81 MG tablet      Take 81 mg by mouth daily        calcium carbonate 600 mg-vitamin D 400 units 600-400 MG-UNIT per tablet    CALTRATE     Take 2 tablets by mouth daily        FOSAMAX PO      Take 70 mg by mouth once a week        multivitamin, therapeutic Tabs tablet      Take 1 tablet by mouth  daily        PRESERVISION AREDS 2 Caps           VITAMIN D (CHOLECALCIFEROL) PO      Take 1,000 Units by mouth daily

## 2018-11-16 ENCOUNTER — THERAPY VISIT (OUTPATIENT)
Dept: PHYSICAL THERAPY | Facility: CLINIC | Age: 83
End: 2018-11-16
Payer: COMMERCIAL

## 2018-11-16 DIAGNOSIS — M17.11 PRIMARY OSTEOARTHRITIS OF RIGHT KNEE: ICD-10-CM

## 2018-11-16 PROCEDURE — 97530 THERAPEUTIC ACTIVITIES: CPT | Mod: GP | Performed by: PHYSICAL THERAPIST

## 2018-11-16 NOTE — MR AVS SNAPSHOT
After Visit Summary   11/16/2018    Hailey Alexis    MRN: [id number]           Patient Information     Date Of Birth          2/10/1930        Visit Information        Provider Department      11/16/2018 11:40 AM Maribel Leija [facility] [place] [facility] [employer] [facility]        Today's Diagnoses     Primary osteoarthritis of right knee           Follow-ups after your visit        Who to contact     If you have questions or need follow up information about today's clinic visit or your schedule please contact [facility] [employer] [place] directly at [phone].  Normal or non-critical lab and imaging results will be communicated to you by HWhart, letter or phone within 4 business days after the clinic has received the results. If you do not hear from us within 7 days, please contact the clinic through [employer] or phone. If you have a critical or abnormal lab result, we will notify you by phone as soon as possible.  Submit refill requests through [employer] or call your pharmacy and they will forward the refill request to us. Please allow 3 business days for your refill to be completed.          Additional Information About Your Visit        MyChart Information     [employer] gives you secure access to your electronic health record. If you see a primary care provider, you can also send messages to your care team and make appointments. If you have questions, please call your primary care clinic.  If you do not have a primary care provider, please call [phone] and they will assist you.        Care EveryWhere ID     This is your Care EveryWhere ID. This could be used by other organizations to access your [facility] medical records  [id number]         Blood Pressure from Last 3 Encounters:   No data found for BP    Weight from Last 3 Encounters:   07/31/18 70.4 kg (155 lb 1.6 oz)   03/28/17 72.2 kg (159 lb 3.2 oz)   11/03/15 71.1 kg (156 lb 12.8 oz)              We Performed the  Following     THERAPEUTIC ACTIVITIES        Primary Care Provider Fax #    Physician No Ref-Primary [phone]       No address on file        Equal Access to Services     SUZANNA DIAZ : Jhon aad ku hadafshan Santos, derick constanzamarekha, annia lester, sunni rodríguezradha saldana. So [facility] [phone].    ATENCIÓN: Si habla español, tiene a espinoza disposición servicios gratuitos de asistencia lingüística. Llame al [phone].    We comply with applicable federal civil rights laws and [place] laws. We do not discriminate on the basis of race, color, national origin, age, disability, sex, sexual orientation, or gender identity.            Thank you!     Thank you for choosing [facility] [place]  for your care. Our goal is always to provide you with excellent care. Hearing back from our patients is one way we can continue to improve our services. Please take a few minutes to complete the written survey that you may receive in the mail after your visit with us. Thank you!             Your Updated Medication List - Protect others around you: Learn how to safely use, store and throw away your medicines at www.disposemymeds.org.          This list is accurate as of 11/16/18 12:35 PM.  Always use your most recent med list.                   Brand Name Dispense Instructions for use Diagnosis    aspirin 81 MG tablet      Take 81 mg by mouth daily        calcium carbonate 600 mg-vitamin D 400 units 600-400 MG-UNIT per tablet    CALTRATE     Take 2 tablets by mouth daily        FOSAMAX PO      Take 70 mg by mouth once a week        multivitamin, therapeutic Tabs tablet      Take 1 tablet by mouth daily        PRESERVISION AREDS 2 Caps           VITAMIN D (CHOLECALCIFEROL) PO      Take 1,000 Units by mouth daily

## 2018-11-16 NOTE — PROGRESS NOTES
PROGRESS REPORT    Hailey has been in therapy from August 15, 2018 to Nov 16, 2018 for treatment of R knee primary OA.    Therapist Impression:  Jenn reports and demonstrates significant functional progress since initial visit. She reports her knee is 95% functional with the remaining deficit being some lapses of discomfort with prolonged standing. With minor adjustments to the HEP at last visit one month ago, Jenn has improved her quad strength and her hip ABD strength by 20% and 4% respectively (see below). She reports and demonstrates independence in her home program and is able to discuss progressions PRN. Jenn has met all short and long term goals, most notably as they pertain to ascending/descneding stairs as well as ambulation tolerance, and no longer requires therapy on a regular basis to return to previous level of activity. Jenn was discharged with a home program with emphasis on quad strength, proximal hip strength, and biking for non WB mobility of the knee.     Subjective:  Continuing to do exercises 3x/week  Up to 35 SLR, increased repetitions with bridging, clam shells, and with squat holds  Reports having improved walking tolerance to up to 40 mins and no longer using walking pole for long walks  Is confident in progressions she can make with exercises at home      Objective:  HHD Knee Extension at 70 deg   Left Trial 1: 35  Trial 2: 38  Pain: -   Right Trial 1: 30  Trial 2: 32  Pain: -   85% of uninvolved (compared to 65% one month ago)        HHD Hip Abduction   Left Trial 1: 13  Trial 2: 13  Pain: -   Right Trial 1: 13  Trial 2: 12  Pain: -   96% of uninvolved (compared to 92% one month ago)          ASSESSMENT/PLAN  Updated problem list and treatment plan: The encounter diagnosis was Primary osteoarthritis of right knee. Decreased ROM/flexibility - HEP  Decreased strength - HEP  STG/LTGs have been met or progress has been made towards goals: Yes, has made significant functional improvements both  on the stairs and with all ambulation, as she now is not having to walk with a walking pole  Assessment of Progress: The patient's condition is improving.  The patient has met all of their long term goals.  Self Management Plans:  Patient has been instructed in a home treatment program.  Patient is independent in a home treatment program.  Patient  has been instructed in self management of symptoms.  Patient is independent in self management of symptoms.  I have re-evaluated this patient and find that the nature, scope, duration and intensity of the therapy is appropriate for the medical condition of the patient.  Hailey continues to require the following intervention to meet STG and LTG's:  PT intervention is no longer required to meet STG/LTG.    Recommendations:  This patient is ready to be discharged from therapy and continue their home treatment program.          Please refer to the daily flowsheet for treatment today, total treatment time and time spent performing 1:1 timed codes.

## 2019-07-15 ASSESSMENT — ENCOUNTER SYMPTOMS
SKIN CHANGES: 1
POOR WOUND HEALING: 0
NAIL CHANGES: 0

## 2019-07-16 ENCOUNTER — OFFICE VISIT (OUTPATIENT)
Dept: ORTHOPEDICS | Facility: CLINIC | Age: 84
End: 2019-07-16
Payer: COMMERCIAL

## 2019-07-16 DIAGNOSIS — M17.12 ARTHRITIS OF LEFT KNEE: Primary | ICD-10-CM

## 2019-07-16 NOTE — NURSING NOTE
Reason For Visit:   Chief Complaint   Patient presents with     RECHECK     Right knee follow up - last injection 6/5/18       Primary MD: No Ref-Primary, Physician    ?  No  Occupation: [employer]  Currently working? Yes.  Work status?  Part-time.  Date of surgery: NA  Type of surgery: NA.  Smoker: No  Request smoking cessation information: No    There were no vitals taken for this visit.    Pain Assessment  Patient Currently in Pain: Wolf Chicas, ATC

## 2019-07-17 NOTE — PROGRESS NOTES
Patient is a delightful 89-year-old female who is here to discuss right knee arthritis.  She has a obvious valgus alignment of her knee which is clinically apparent.  She received an injection in June 2018 she was not sure if that helped    Since that time she is been extraordinarily active.  She lives alone but does have 4 sons to take good care of her.  She went on a trip to [place] in April of this year to see the 2 lips and manage that without problems.  Shortly after that she went on a sailing trip to [place].    The reason she is here is she is planning a 4-month round the world or nearly round the world trip and she would like to know if I think she should get her knee done before that.  The trip starting in [place] and having [place], going around [place], [place], through [place] and [facility] and ending in [place].    She reports that she has no pain.  When trying to tease out whether she has any confidence she apparently is not fearing falling.  She always uses a handrail going up and down steps.  The going down steps can be a little bit tricky but she never has fear of falling.  She is worked out a way that she can go down a bit sideways.    She reports that when she walks she either has 1 of her sons at her side or she uses walking sticks.    She reported a pain scale today is 0.  It never wakes her from sleep at night.    Examination of her right knee reveals a valgus alignment which is not passively correctable.  Range of motion 5-1 32 some varicosities are present.  No significant pedal edema    Examination of left knee is somewhat remarkable for edema that is possibly lymphatic in origin as it is larger leg in general.  Significant varicosities are noted.  Similar range of motion is present    X-rays were reviewed that were taken in 2018 and felt not to be not and what felt that there was no need to be repeated when the long leg alignment films were compared to those 6 years earlier they were  nearly identical valgus alignment measuring 12 degrees genu valgum    Assessment: It is hard to argue with this woman's function as she is quite active, does everything she wants to do, is not falling, and has no pain.    My only advice to her would be to ask her sons and her best friend if they see that she is having some disability that perhaps is not recognizable to her.  However I doubt that this will lead to any different answer as she again remains quite active.    In the absence of problems to follow-up with me on an as-needed basis.      Room time 25 minutes consultation time 25    Hailey Gray MD  Professor Orthopedic Surgery  [facility]  Answers for HPI/ROS submitted by the patient on 7/15/2019   General Symptoms: No  Skin Symptoms: Yes  HENT Symptoms: No  EYE SYMPTOMS: No  HEART SYMPTOMS: No  LUNG SYMPTOMS: No  INTESTINAL SYMPTOMS: No  URINARY SYMPTOMS: No  GYNECOLOGIC SYMPTOMS: No  BREAST SYMPTOMS: No  SKELETAL SYMPTOMS: No  BLOOD SYMPTOMS: No  NERVOUS SYSTEM SYMPTOMS: No  MENTAL HEALTH SYMPTOMS: No  Changes in hair: No  Changes in moles/birth marks: Yes  Itching: No  Rashes: No  Changes in nails: No  Acne: No  Hair in places you don't want it: No  Change in facial hair: No  Warts: No  Non-healing sores: No  Scarring: No  Flaking of skin: No  Color changes of hands/feet in cold : No  Sun sensitivity: No  Skin thickening: No

## 2019-10-01 ENCOUNTER — HEALTH MAINTENANCE LETTER (OUTPATIENT)
Age: 84
End: 2019-10-01

## 2019-12-15 ENCOUNTER — HEALTH MAINTENANCE LETTER (OUTPATIENT)
Age: 84
End: 2019-12-15

## 2021-01-15 ENCOUNTER — HEALTH MAINTENANCE LETTER (OUTPATIENT)
Age: 86
End: 2021-01-15

## 2021-01-31 ENCOUNTER — IMMUNIZATION (OUTPATIENT)
Dept: NURSING | Facility: CLINIC | Age: 86
End: 2021-01-31
Payer: COMMERCIAL

## 2021-01-31 PROCEDURE — 0001A PR COVID VAC PFIZER DIL RECON 30 MCG/0.3 ML IM: CPT

## 2021-01-31 PROCEDURE — 91300 PR COVID VAC PFIZER DIL RECON 30 MCG/0.3 ML IM: CPT

## 2021-02-21 ENCOUNTER — IMMUNIZATION (OUTPATIENT)
Dept: NURSING | Facility: CLINIC | Age: 86
End: 2021-02-21
Attending: INTERNAL MEDICINE
Payer: COMMERCIAL

## 2021-02-21 PROCEDURE — 91300 PR COVID VAC PFIZER DIL RECON 30 MCG/0.3 ML IM: CPT

## 2021-02-21 PROCEDURE — 0002A PR COVID VAC PFIZER DIL RECON 30 MCG/0.3 ML IM: CPT

## 2021-09-04 ENCOUNTER — HEALTH MAINTENANCE LETTER (OUTPATIENT)
Age: 86
End: 2021-09-04

## 2022-02-19 ENCOUNTER — HEALTH MAINTENANCE LETTER (OUTPATIENT)
Age: 87
End: 2022-02-19

## 2022-10-16 ENCOUNTER — HEALTH MAINTENANCE LETTER (OUTPATIENT)
Age: 87
End: 2022-10-16

## 2023-03-26 ENCOUNTER — HEALTH MAINTENANCE LETTER (OUTPATIENT)
Age: 88
End: 2023-03-26

## 2023-08-09 ENCOUNTER — ANCILLARY PROCEDURE (OUTPATIENT)
Dept: GENERAL RADIOLOGY | Facility: CLINIC | Age: 88
End: 2023-08-09
Attending: FAMILY MEDICINE
Payer: COMMERCIAL

## 2023-08-09 ENCOUNTER — OFFICE VISIT (OUTPATIENT)
Dept: FAMILY MEDICINE | Facility: CLINIC | Age: 88
End: 2023-08-09
Payer: COMMERCIAL

## 2023-08-09 VITALS
TEMPERATURE: 97.8 F | SYSTOLIC BLOOD PRESSURE: 128 MMHG | BODY MASS INDEX: 24.24 KG/M2 | WEIGHT: 142 LBS | RESPIRATION RATE: 18 BRPM | OXYGEN SATURATION: 96 % | HEIGHT: 64 IN | HEART RATE: 69 BPM | DIASTOLIC BLOOD PRESSURE: 83 MMHG

## 2023-08-09 DIAGNOSIS — G47.09 OTHER INSOMNIA: ICD-10-CM

## 2023-08-09 DIAGNOSIS — M25.551 RIGHT HIP PAIN: ICD-10-CM

## 2023-08-09 DIAGNOSIS — I48.20 CHRONIC ATRIAL FIBRILLATION (H): ICD-10-CM

## 2023-08-09 DIAGNOSIS — I63.9 CEREBROVASCULAR ACCIDENT (CVA), UNSPECIFIED MECHANISM (H): ICD-10-CM

## 2023-08-09 DIAGNOSIS — H91.93 BILATERAL HEARING LOSS, UNSPECIFIED HEARING LOSS TYPE: Primary | ICD-10-CM

## 2023-08-09 PROBLEM — H91.90 HARD OF HEARING: Status: ACTIVE | Noted: 2023-08-09

## 2023-08-09 PROCEDURE — 77073 BONE LENGTH STUDIES: CPT | Performed by: STUDENT IN AN ORGANIZED HEALTH CARE EDUCATION/TRAINING PROGRAM

## 2023-08-09 PROCEDURE — 73502 X-RAY EXAM HIP UNI 2-3 VIEWS: CPT | Mod: RT | Performed by: RADIOLOGY

## 2023-08-09 RX ORDER — METOPROLOL SUCCINATE 25 MG/1
25 TABLET, EXTENDED RELEASE ORAL
Status: ON HOLD | COMMUNITY
Start: 2023-01-30 | End: 2024-04-12

## 2023-08-09 RX ORDER — DIGOXIN 250 MCG
TABLET ORAL
COMMUNITY
End: 2023-09-28

## 2023-08-09 RX ORDER — TRAZODONE HYDROCHLORIDE 50 MG/1
25 TABLET, FILM COATED ORAL AT BEDTIME
Qty: 30 TABLET | Refills: 0 | Status: SHIPPED | OUTPATIENT
Start: 2023-08-09 | End: 2023-09-29

## 2023-08-09 RX ORDER — APIXABAN 2.5 MG/1
TABLET, FILM COATED ORAL
COMMUNITY

## 2023-08-09 NOTE — PROGRESS NOTES
Medical assistant intake:  Hailey Alexis is a 89 year old female who presents to [facility] today for Physical (Establish care. Right hip -- hurts. Issues with sleeping. )       -  ASSESSMENT and PLAN:    - RIGHT hip pain with large valgus at RIGHT knee. No pain in RIGHT knee  Obtain X-rays. Call [phone] or [phone] to schedule imaging tests at the [facility].     Sent to physical therapy  Encouraged to have a Walker nearby at all times. Suggested lightweight folding walker  Further suggestions pending progress      - Hard of hearing  About to get new hearing aids.       - Difficulty sleeping  Discussed the balance needed to treat the insomnia but not have too much sedation  Will start with low dose Trazodone at (1/2 tab, 25 mg/night)  Let me know how it goes. If not too sedating, can take 50mg    Other issues:   - Chronic Atrial Fibrillation  - History of stroke, now recovered. On Eliquis      Follow-up as needed for any of the above    Gabe Etienne MD  Family Medicine and Sports Medicine  [facility]      SUBJECTIVE:   Jenn is a very healthy 89 yo who is making her first visit to our clinic.   She's the mother of Sunil Peng. Has received care at [facility] in the past.     Here today as she has been having increasing right lateral hip pain.  This has been going on for months.  It aches as the day progresses.  She receives a few home physical therapy visits from some private physical therapist.  She pays out-of-pocket for that.  She has longstanding deformity to the right leg but does not have pain in her right knee.  Cannot recall any injuries.    She remains ambulatory but generally uses a walker when her children or grandchildren are not around.    Her other main issue today is that she has been having difficulty sleeping.  This is a bit difficult to quantify but she tends to turn off her television at about 10:30 at night and feels that  "she does not get to sleep until about 2 in the morning.  She then seems to sleep fairly well but is awakened frequently by her cat..  She uses Aleve-PM at bedtime and that helps a bit.  She has not tried other sleeping pills.     She is also very hard of hearing and is about to have new hearing aids.      PMH -   Per  report on April 27, 2022 Cardiology visit with [facility]:   \"Laurel is a 89 y.o. female with history of rate controlled atrial fibrillation (digoxin), prior stroke (June 2020), low normal EF (50-55%), moderate tricuspid regurgitation,       Review Of Systems:  Otherwise in her usual state of health      Problem list per EMR:  Patient Active Problem List   Diagnosis    Primary osteoarthritis of right knee    Cerebrovascular accident (H)    Chronic atrial fibrillation (H)    Hard of hearing       Current Outpatient Medications   Medication Sig Dispense Refill    Alendronate Sodium (FOSAMAX PO) Take 70 mg by mouth once a week      calcium-vitamin D (CALTRATE) 600-400 MG-UNIT per tablet Take 2 tablets by mouth daily      digoxin (LANOXIN) 250 MCG tablet TAKE 1 TABLET BY MOUTH ONCE DAILY*      ELIQUIS ANTICOAGULANT 2.5 MG tablet Take 1 Tablet (2.5 mg) by mouth two times a day.*      metoprolol succinate ER (TOPROL XL) 25 MG 24 hr tablet Take 25 mg by mouth      Multiple Vitamins-Minerals (PRESERVISION AREDS 2) CAPS       multivitamin, therapeutic (THERA-VIT) TABS Take 1 tablet by mouth daily      traZODone (DESYREL) 50 MG tablet Take 0.5 tablets (25 mg) by mouth At Bedtime 30 tablet 0    VITAMIN D, CHOLECALCIFEROL, PO Take 1,000 Units by mouth daily      aspirin 81 MG tablet Take 81 mg by mouth daily (Patient not taking: Reported on 8/9/2023)         Allergies   Allergen Reactions    Penicillin G Anaphylaxis     Red and inflamed        Social:   Social History     Social History Narrative     twice. Now, has a new boyfriend who is 13 years younger.     Has 4 children.     Lives in [place]" "with a grandson. Has an active social life.          OBJECTIVE    Vitals: /83 (BP Location: Left arm, Patient Position: Sitting, Cuff Size: Adult Regular)   Pulse 69   Temp 97.8  F (36.6  C) (Temporal)   Resp 18   Ht 1.619 m (5' 3.75\")   Wt 64.4 kg (142 lb)   SpO2 96%   BMI 24.57 kg/m    BMI= Body mass index is 24.57 kg/m .  She appears as a vigorous and cognitively completely intact 89-year-old who intermittently uses her Apple Watch and her iPhone.  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    She is able to rise from a seated position but her son helps her a bit with this.  She can take a few steps and then with using her left leg get up onto the examination table.    Cardiovascular-appears to be in atrial fibrillation with a rate of approximately 70 bpm.  No murmurs were heard.    Lungs-clear to auscultation        Her right leg has an obvious valgus deformity at the knee.  Her knee does not hurt.  She is able to do his active straight leg raise with the right leg.  There is no groin pain in the right hip.  The right hip pain is located laterally and also anteriorly.  Hip flexion to about 95 degrees, internal range of motion to about 15 degrees, external range of motion to about 35 degrees.  Negative straight leg raise.    SEE TOP OF NOTE FOR ASSESSMENT AND PLAN  45 minutes spent on the date of the encounter doing chart review, history and exam, documentation and further activities as noted in the note.     --Gabe Etienne MD  [facility], Department of Family Medicine and Community Health  "

## 2023-08-09 NOTE — PATIENT INSTRUCTIONS
-  ASSESSMENT and PLAN:    - RIGHT hip pain with large valgus at RIGHT knee. No pain in RIGHT knee  Obtain X-rays  Sent to physical therapy  Encouraged to have a Walker nearby at all times. Suggested lightweight folding walker  Further suggestions pending progress      - Hard of hearing  About to get new hearing aids.       - Difficulty sleeping  Discussed the balance needed to treat the insomnia but not have too much sedation  Will start with low dose Trazodone at (1/2 tab, 25 mg/night)  Let me know how it goes. If not too sedating, can take 50mg    Other issues:   - Chronic Atrial Fibrillation  - History of stroke, now recovered. On Eliquis      Follow-up as needed for any of the above    Gabe Etienne MD  Family Medicine and Sports Medicine  [facility]

## 2023-08-10 ENCOUNTER — TELEPHONE (OUTPATIENT)
Dept: FAMILY MEDICINE | Facility: CLINIC | Age: 88
End: 2023-08-10

## 2023-08-10 NOTE — TELEPHONE ENCOUNTER
Message left for Agus Peng, son, to return call to clinic to discuss referral request.  ELIZABETH LeeN, RN, CCM  RN Care Coordinator  [facility]  08/10/23  12:15 PM  Phone: [phone]

## 2023-08-10 NOTE — TELEPHONE ENCOUNTER
Who is calling? Son   What do they need? Referral for audiology and order for ear wash.  Is this an insurance referral? No  Does caller need a call back? Yes   Additional Comments: About to get new hearing aids.   Patient son requesting referral to be sent to [facility] center, fax number [phone].

## 2023-08-11 NOTE — TELEPHONE ENCOUNTER
Called and spoke with Agus, Jenn' son.  Jenn needs to have her ears cleaned per her audiologist.  Scheduled her to see Dr. Etienne on 8/16/23 @ 11:40 am.  Agus will investigate whether or not their previous audiologist versus the audiology clinic at the [place] can see her the soonest for new hearing aids and will let me know so I can send the referral.  ELIZABETH LeeN, RN, [facility]  RN Care Coordinator  [facility]  08/11/23  1:43 PM  Phone: [phone]

## 2023-08-21 ENCOUNTER — OFFICE VISIT (OUTPATIENT)
Dept: FAMILY MEDICINE | Facility: CLINIC | Age: 88
End: 2023-08-21
Payer: COMMERCIAL

## 2023-08-21 VITALS — TEMPERATURE: 97.8 F

## 2023-08-21 DIAGNOSIS — H91.93 BILATERAL HEARING LOSS, UNSPECIFIED HEARING LOSS TYPE: ICD-10-CM

## 2023-08-21 DIAGNOSIS — H61.23 BILATERAL IMPACTED CERUMEN: Primary | ICD-10-CM

## 2023-08-21 NOTE — NURSING NOTE
Ceruminosis is noted bilaterally.  Wax is successfully removed by syringing and manual debridement. Instructions for home care to prevent wax buildup are given.     This service provided today was under the supervising provider of the day Dr. Gabe Etienne, who was available if needed.    Trsiha Ashley LPN 10:02 AM August 21, 2023

## 2023-08-21 NOTE — NURSING NOTE
89 year old  Chief Complaint   Patient presents with    Ear Problem     Ear cleaning       Temperature 97.8  F (36.6  C), temperature source Temporal. There is no height or weight on file to calculate BMI.  Patient Active Problem List   Diagnosis    Primary osteoarthritis of right knee    Cerebrovascular accident (H)    Chronic atrial fibrillation (H)    Hard of hearing       Wt Readings from Last 2 Encounters:   08/09/23 64.4 kg (142 lb)   07/31/18 70.4 kg (155 lb 1.6 oz)     BP Readings from Last 3 Encounters:   08/09/23 128/83         Current Outpatient Medications   Medication    Alendronate Sodium (FOSAMAX PO)    calcium-vitamin D (CALTRATE) 600-400 MG-UNIT per tablet    digoxin (LANOXIN) 250 MCG tablet    ELIQUIS ANTICOAGULANT 2.5 MG tablet    metoprolol succinate ER (TOPROL XL) 25 MG 24 hr tablet    Multiple Vitamins-Minerals (PRESERVISION AREDS 2) CAPS    multivitamin, therapeutic (THERA-VIT) TABS    traZODone (DESYREL) 50 MG tablet    VITAMIN D, CHOLECALCIFEROL, PO     No current facility-administered medications for this visit.       Social History     Tobacco Use    Smoking status: Former     Types: Cigarettes    Smokeless tobacco: Never   Substance Use Topics    Alcohol use: Yes     Alcohol/week: 1.0 standard drink of alcohol     Types: 1 Standard drinks or equivalent per week       Health Maintenance Due   Topic Date Due    ADVANCE CARE PLANNING  Never done    FALL RISK ASSESSMENT  Never done    MEDICARE ANNUAL WELLNESS VISIT  02/23/2012    PHQ-2 (once per calendar year)  01/01/2023    COVID-19 Vaccine (6 - Pfizer series) 01/20/2023       No results found for: PAP      August 21, 2023 9:40 AM

## 2023-08-21 NOTE — PROGRESS NOTES
Medical assistant intake:  Hailey Alexis is a 89 year old female who presents to [facility] today for Ear Problem (Ear cleaning)       -  ASSESSMENT and PLAN:      1. Bilateral hearing loss, unspecified hearing loss type  Referred to:   - Adult Audiology  Referral; Future    2. Bilateral impacted cerumen  Irrigation done in clinic  - REMOVE IMPACTED CERUMEN    3. Sleep is slightly improved with 1/2 tab (25 mg of Trazodone). She sometimes takes 50 mg. She will let us know if she needs a refill    Follow-up as needed    Gabe Etienne MD  Family Medicine and Sports Medicine  [facility]      SUBJECTIVE:   Jenn is here to have her ears evaluated.  She is hard of hearing.  Wears hearing aids.  Has some wax in the ears that she would like irrigated.  Also requests a referral to audiology.  She is a once again accompanied by her son, Sunil.    Review Of Systems:    See subjective.   Has otherwise been in usual state of health    Problem list per EMR:  Patient Active Problem List   Diagnosis    Primary osteoarthritis of right knee    Cerebrovascular accident (H)    Chronic atrial fibrillation (H)    Hard of hearing       Current Outpatient Medications   Medication Sig Dispense Refill    Alendronate Sodium (FOSAMAX PO) Take 70 mg by mouth once a week      calcium-vitamin D (CALTRATE) 600-400 MG-UNIT per tablet Take 2 tablets by mouth daily      digoxin (LANOXIN) 250 MCG tablet TAKE 1 TABLET BY MOUTH ONCE DAILY*      ELIQUIS ANTICOAGULANT 2.5 MG tablet Take 1 Tablet (2.5 mg) by mouth two times a day.*      metoprolol succinate ER (TOPROL XL) 25 MG 24 hr tablet Take 25 mg by mouth      Multiple Vitamins-Minerals (PRESERVISION AREDS 2) CAPS       multivitamin, therapeutic (THERA-VIT) TABS Take 1 tablet by mouth daily      traZODone (DESYREL) 50 MG tablet Take 0.5 tablets (25 mg) by mouth At Bedtime 30 tablet 0    VITAMIN D, CHOLECALCIFEROL, PO Take 1,000 Units by mouth daily         Allergies    Allergen Reactions    Penicillin G Anaphylaxis     Red and inflamed        OBJECTIVE    Vitals: Temp 97.8  F (36.6  C) (Temporal)   BMI= There is no height or weight on file to calculate BMI.  Continues to appear as a very healthy 89-year-old.  She needs a bit of assistance to get up on the examination table but not much.  Her ears had a mild to moderate amount of cerumen in both.    Intraoffice procedure note: Using warm water and hydrogen peroxide the ears were irrigated removing some of the cerumen.  There were no complications.    SEE TOP OF NOTE FOR ASSESSMENT AND PLAN    --Gabe Etienne MD  [facility], Department of Family Medicine and Community Health

## 2023-08-23 ENCOUNTER — OFFICE VISIT (OUTPATIENT)
Dept: AUDIOLOGY | Facility: CLINIC | Age: 88
End: 2023-08-23
Payer: COMMERCIAL

## 2023-08-23 DIAGNOSIS — H90.3 SENSORY HEARING LOSS, BILATERAL: Primary | ICD-10-CM

## 2023-08-23 DIAGNOSIS — H91.93 BILATERAL HEARING LOSS, UNSPECIFIED HEARING LOSS TYPE: ICD-10-CM

## 2023-08-23 PROCEDURE — 92557 COMPREHENSIVE HEARING TEST: CPT | Performed by: AUDIOLOGIST

## 2023-08-23 PROCEDURE — 92550 TYMPANOMETRY & REFLEX THRESH: CPT | Mod: 52 | Performed by: AUDIOLOGIST

## 2023-08-23 NOTE — PROGRESS NOTES
AUDIOLOGY REPORT    SUBJECTIVE:  Hailey Alexis is a 89 year old female who was seen in the Audiology Clinic at the [facility] for audiologic evaluation, referred by Gabe Etienne M.D.  The patient reports known bilateral hearing loss and wears Oticon LENO hearing aids fit at [facility] approximately 1-2 years ago. She reports being a hearing aid user for about 10 years. Currently the right hearing aid is being repaired. She is accompanied by her son. They are interested in switching her hearing aid care to this clinic. She reports significant difficulty hearing even with hearing aids, especially in situations with multiple talkers or background noise. She denies aural pain, pressure, drainage, tinnitus, dizziness, and history of ear surgeries.     OBJECTIVE:  Otoscopic exam indicates ears are clear of cerumen bilaterally     Pure Tone Thresholds assessed using conventional audiometry with good  reliability from 250-8000 Hz bilaterally using insert earphones and circumaural headphones     RIGHT:  normal sloping to profound sensorineural hearing loss    LEFT:    normal sloping to profound sensorineural hearing loss    Tympanogram:    RIGHT: normal eardrum mobility    LEFT:   hypercompliant eardrum mobility    Reflexes (reported by stimulus ear):  RIGHT: Ipsilateral is absent at frequencies tested  RIGHT: Contralateral could not assess due to artifact  LEFT:   Ipsilateral is could not assess due to artifact  LEFT:   Contralateral is absent at frequencies tested      Speech Reception Threshold:    RIGHT: 35 dB HL    LEFT:   40 dB HL  Word Recognition Score:     RIGHT: 36% at 90 dB HL using NU-6 recorded word list.     72% at 80 dB HL using NU-6 recorded word list.    LEFT:   32% at 90 dB HL using NU-6 recorded word list.      40% at 80 dB HL using NU-6 recorded word list    ASSESSMENT:    Normal hearing sloping to profound sensorineural hearing loss  bilaterally. Asymmetrical word recognition noted: 72% right, 40% left. Today s results were discussed with the patient and her son in detail.     PLAN:  Patient was counseled regarding hearing loss and impact on communication.  Patient continues to be is a good candidate for amplification at this time.  Handout on good communication strategies was given to patient. It is recommended that the patient return for an extended hearing aid check to establish care and to make programming adjustments. Also discussed trying a remote microphone.  Follow up with ENT regarding asymmetrical word recognition. Please call this clinic with questions regarding these results or recommendations.        Tang Cuevas.  Licensed Audiologist  [place] # [id number]

## 2023-08-30 ENCOUNTER — TELEPHONE (OUTPATIENT)
Dept: FAMILY MEDICINE | Facility: CLINIC | Age: 88
End: 2023-08-30

## 2023-08-30 NOTE — TELEPHONE ENCOUNTER
Pt son Agus is calling to ask about the safe use of some joint health supplements and whether there are any interactions with her current prescription medications.    Turmeric - 1500 mg  Glucosamine MSM  Gingko biloba - 120 mg    Per [employer] review, the only documented interaction is between anticoagulants and curcumin which is a component of turmeric so coadministration should be avoided d/t to potential increase in bleeding time. Presumed safe use of glucosamine and gingko biloba. Discussed with Dr. Etienne who is comfortable with this plan. Called Agus to discuss medication interactions and he confirms understanding.    Cesar WILCOX, RN  08/30/23 1:01 PM

## 2023-09-07 ENCOUNTER — THERAPY VISIT (OUTPATIENT)
Dept: PHYSICAL THERAPY | Facility: CLINIC | Age: 88
End: 2023-09-07
Attending: FAMILY MEDICINE
Payer: COMMERCIAL

## 2023-09-07 DIAGNOSIS — M25.551 RIGHT HIP PAIN: ICD-10-CM

## 2023-09-07 PROCEDURE — 97110 THERAPEUTIC EXERCISES: CPT | Mod: GP | Performed by: PHYSICAL THERAPIST

## 2023-09-07 PROCEDURE — 97161 PT EVAL LOW COMPLEX 20 MIN: CPT | Mod: GP | Performed by: PHYSICAL THERAPIST

## 2023-09-07 ASSESSMENT — ACTIVITIES OF DAILY LIVING (ADL)
GOING_UP_1_FLIGHT_OF_STAIRS: MODERATE DIFFICULTY
HOS_ADL_ITEM_SCORE_TOTAL: 35
HOS_ADL_HIGHEST_POTENTIAL_SCORE: 64
HOS_ADL_COUNT: 16
HEAVY_WORK: EXTREME DIFFICULTY
DEEP_SQUATTING: EXTREME DIFFICULTY
RECREATIONAL_ACTIVITIES: SLIGHT DIFFICULTY
SITTING_FOR_15_MINUTES: NO DIFFICULTY AT ALL
WALKING_15_MINUTES_OR_GREATER: SLIGHT DIFFICULTY
STEPPING_UP_AND_DOWN_CURBS: MODERATE DIFFICULTY
LIGHT_TO_MODERATE_WORK: MODERATE DIFFICULTY
GOING_DOWN_1_FLIGHT_OF_STAIRS: MODERATE DIFFICULTY
STANDING_FOR_15_MINUTES: MODERATE DIFFICULTY
WALKING_DOWN_STEEP_HILLS: MODERATE DIFFICULTY
WALKING_UP_STEEP_HILLS: MODERATE DIFFICULTY
WALKING_APPROXIMATELY_10_MINUTES: SLIGHT DIFFICULTY
WALKING_INITIALLY: MODERATE DIFFICULTY
GETTING_INTO_AND_OUT_OF_AN_AVERAGE_CAR: SLIGHT DIFFICULTY
ROLLING_OVER_IN_BED: SLIGHT DIFFICULTY
PUTTING_ON_SOCKS_AND_SHOES: NO DIFFICULTY AT ALL
TWISTING/PIVOTING_ON_INVOLVED_LEG: MODERATE DIFFICULTY
HOS_ADL_SCORE(%): 54.69

## 2023-09-07 NOTE — PROGRESS NOTES
PHYSICAL THERAPY EVALUATION  Type of Visit: Evaluation    See electronic medical record for Abuse and Falls Screening details.    Subjective       Presenting condition or subjective complaint: hip pain right   Date of onset: 03/07/23    Relevant medical history: Bladder or bowel problems; Dizziness; Hearing problems; High blood pressure; Osteoporosis; Stroke   Dates & types of surgery:      Prior diagnostic imaging/testing results: X-ray     Prior therapy history for the same diagnosis, illness or injury: No    Right lateral hip pain, began 6 months ago.  Can get worse as the day progresses.  Using a heating pad with success.   Pt has someone who comes to help with exercise on occasion.  Pt lives in a condo with grandson.  Dizziness from a cruise in that past that has resolved.  Hx of a stroke in the past feels fully recovered.    Living Environment  Social support:     Type of home:     Stairs to enter the home:         Ramp:     Stairs inside the home:         Help at home:    Equipment owned:       Employment:      Hobbies/Interests:      Patient goals for therapy: pain reduction, live without pain    Pain assessment: Pain presentconstant right lat hip 1/10 goes up to 5-6/10 achy pain     Objective   Obs: pleasant female walks with 4 wheeled walker  Valgus knees DEEP right significantly greater than left  Hip ROM WFL DEEP and painfree  SLS needs UE support DEEP  2L squat in and out of chair pain 5/10 and unable to do without UE support  MMT: hip flex 4-/5 right, 4/5 left  Knee ext 4-/5 IL  Hip abd 3+/5 DEEP  Hip ext 3+/5 DEEP  No TTP    Assessment & Plan   CLINICAL IMPRESSIONS  Medical Diagnosis: DEEP hip pain    Treatment Diagnosis: DEEP hip pain   Impression/Assessment: Patient is a 89 year old female with right hip pain complaints.  The following significant findings have been identified: Pain, Decreased strength, Impaired balance, and Decreased activity tolerance. These impairments interfere with their ability to  perform self care tasks, recreational activities, household chores, household mobility, and community mobility as compared to previous level of function.     Clinical Decision Making (Complexity):  Clinical Presentation: Stable/Uncomplicated  Clinical Presentation Rationale: based on medical and personal factors listed in PT evaluation  Clinical Decision Making (Complexity): Low complexity    PLAN OF CARE  Treatment Interventions:  Interventions: Manual Therapy, Neuromuscular Re-education, Therapeutic Activity, Therapeutic Exercise    Long Term Goals     PT Goal 1  Goal Identifier: pain 5/10 squatting in and out of chair  Goal Description: no pain good mechanics squatting in and out of chair  Rationale: to maximize safety and independence with performance of ADLs and functional tasks;to maximize safety and independence within the home  Target Date: 11/02/23      Frequency of Treatment: 1 time a week  Duration of Treatment: 8 weeks    Recommended Referrals to Other Professionals:  none  Education Assessment:   Education Comments: anatomy, POC    Risks and benefits of evaluation/treatment have been explained.   Patient/Family/caregiver agrees with Plan of Care.     Evaluation Time:     PT Eval, Low Complexity Minutes (15081): 25       Signing Clinician: Luis E Watters, RG      [facility]                                                                                   OUTPATIENT PHYSICAL THERAPY      PLAN OF TREATMENT FOR OUTPATIENT REHABILITATION   Patient's Last Name, First Name, Hailey Atkins YOB: 1930   Provider's Name   [facility]   Medical Record No.  [id number]     Onset Date: 03/07/23  Start of Care Date: 09/07/23     Medical Diagnosis:  DEEP hip pain      PT Treatment Diagnosis:  DEEP hip pain Plan of Treatment  Frequency/Duration: 1 time a week/ 8 weeks    Certification date from 09/07/23 to 11/02/23         See note for  plan of treatment details and functional goals     Luis E Watters, PT                         I CERTIFY THE NEED FOR THESE SERVICES FURNISHED UNDER        THIS PLAN OF TREATMENT AND WHILE UNDER MY CARE     (Physician attestation of this document indicates review and certification of the therapy plan).                Referring Provider:  Gabe Etienne      Initial Assessment  See Epic Evaluation- Start of Care Date: 09/07/23

## 2023-09-14 ENCOUNTER — OFFICE VISIT (OUTPATIENT)
Dept: OTOLARYNGOLOGY | Facility: CLINIC | Age: 88
End: 2023-09-14
Payer: COMMERCIAL

## 2023-09-14 VITALS
DIASTOLIC BLOOD PRESSURE: 78 MMHG | BODY MASS INDEX: 23.56 KG/M2 | WEIGHT: 138 LBS | HEIGHT: 64 IN | HEART RATE: 73 BPM | SYSTOLIC BLOOD PRESSURE: 150 MMHG

## 2023-09-14 DIAGNOSIS — H90.3 SENSORINEURAL HEARING LOSS (SNHL) OF BOTH EARS: Primary | ICD-10-CM

## 2023-09-14 PROCEDURE — 99203 OFFICE O/P NEW LOW 30 MIN: CPT | Performed by: REGISTERED NURSE

## 2023-09-14 ASSESSMENT — PAIN SCALES - GENERAL: PAINLEVEL: NO PAIN (0)

## 2023-09-14 NOTE — PROGRESS NOTES
Otolaryngology Clinic  September 14, 2023    Chief Complaint:   Hearing loss       History of Present Illness:   Hailey Alexis is a 89 year old female who presents today for evaluation of hearing loss and asymmetric word recognition score. Patient is accompanied by son today.  Patient reports a gradual bilateral hearing loss that has progressively worsened over the past 15 years.  Does not notice a difference in hearing between ears but does have quite a bit of difficulty with conversations.  Denies any difference in clarity between ears.  Patient wears hearing aids bilaterally.  Scheduled for hearing aid check and adjustment with audiology in a couple of weeks.    Patient denies any otalgia, otorrhea, dizziness, tinnitus, facial numbness/weakness, history of frequent ear infections, or ear surgeries.  Denies family history of hearing loss or loud noise exposure.    Past Medical History:  Past Medical History:   Diagnosis Date    Cerebrovascular accident (H)     Chronic atrial fibrillation (H)     Hard of hearing        Past Surgical History:  No past surgical history on file.    Medications:  Current Outpatient Medications   Medication Sig Dispense Refill    Alendronate Sodium (FOSAMAX PO) Take 70 mg by mouth once a week      calcium-vitamin D (CALTRATE) 600-400 MG-UNIT per tablet Take 2 tablets by mouth daily      digoxin (LANOXIN) 250 MCG tablet TAKE 1 TABLET BY MOUTH ONCE DAILY*      ELIQUIS ANTICOAGULANT 2.5 MG tablet Take 1 Tablet (2.5 mg) by mouth two times a day.*      metoprolol succinate ER (TOPROL XL) 25 MG 24 hr tablet Take 25 mg by mouth      Multiple Vitamins-Minerals (PRESERVISION AREDS 2) CAPS       multivitamin, therapeutic (THERA-VIT) TABS Take 1 tablet by mouth daily      traZODone (DESYREL) 50 MG tablet Take 0.5 tablets (25 mg) by mouth At Bedtime 30 tablet 0    VITAMIN D, CHOLECALCIFEROL, PO Take 1,000 Units by mouth daily         Allergies:  Allergies   Allergen Reactions    Penicillin G  "Anaphylaxis     Red and inflamed        Social History:  Social History     Tobacco Use    Smoking status: Former     Types: Cigarettes    Smokeless tobacco: Never   Substance Use Topics    Alcohol use: Yes     Alcohol/week: 1.0 standard drink of alcohol     Types: 1 Standard drinks or equivalent per week       ROS: 10 point ROS neg other than the symptoms noted above in the HPI.    Physical Exam:    BP (!) 150/78 (BP Location: Left arm, Patient Position: Sitting, Cuff Size: Adult Regular)   Pulse 73   Ht 1.619 m (5' 3.75\")   Wt 62.6 kg (138 lb)   BMI 23.87 kg/m       Constitutional:  The patient was well-groomed and in no acute distress.     Skin: Normal:  warm and pink without rash    Neurologic: Alert and oriented x 3.  CN's III-XII within normal limits.  Voice normal.    Psychiatric: The patient's affect was calm, cooperative, and appropriate.     Communication:  Normal; communicates verbally, normal voice quality.    Respiratory: Breathing comfortably without stridor or exertion of accessory muscles.    Ears: Pinnae and tragus non-tender.  EAC's and TM's were clear.       Audiogram: 9/14/2023 - data independently reviewed  Normal sloping to profound SNHL bilaterally  WR right: 72% left: 40% at 80 dB  Tympanograms: type A right, type A left     Assessment and Plan:  1. Sensorineural hearing loss (SNHL) of both ears  Patient with bilateral sensorineural hearing loss with asymmetric word recognition score. Reviewed audiogram with patient and son today. Reviewed indication for a MRI due to asymmetric word recognition score. Hearing is symmetric and patient does not notice a difference between ears. Will monitor hearing and WRS at this time with repeat audiogram in 1 year.     Due to poor word recognition score, discussed other options of aiding hearing if a traditional hearing aid in the left ear is not beneficial including BICROS and cochlear implant. Will monitor and work with audiology as " scheduled.    Patient will follow up in 1 year with audiogram.    Melida Ponce DNP, APRN, CNP  Otolaryngology  Head & Neck Surgery  [phone]    30 minutes spent by me on the date of the encounter doing chart review, history and exam, documentation and further activities per the note

## 2023-09-14 NOTE — LETTER
9/14/2023       RE: Hailey Alexis  [address]  [place] [address]     Dear Colleague,    Thank you for referring your patient, Hailey Alexis, to the [facility] E[facility]IC [place] [facility]. Please see a copy of my visit note below.      Otolaryngology Clinic  September 14, 2023    Chief Complaint:   Hearing loss       History of Present Illness:   Hailey Alexis is a 89 year old female who presents today for evaluation of hearing loss and asymmetric word recognition score. Patient is accompanied by son today.  Patient reports a gradual bilateral hearing loss that has progressively worsened over the past 15 years.  Does not notice a difference in hearing between ears but does have quite a bit of difficulty with conversations.  Denies any difference in clarity between ears.  Patient wears hearing aids bilaterally.  Scheduled for hearing aid check and adjustment with audiology in a couple of weeks.    Patient denies any otalgia, otorrhea, dizziness, tinnitus, facial numbness/weakness, history of frequent ear infections, or ear surgeries.  Denies family history of hearing loss or loud noise exposure.    Past Medical History:  Past Medical History:   Diagnosis Date     Cerebrovascular accident (H)      Chronic atrial fibrillation (H)      Hard of hearing        Past Surgical History:  No past surgical history on file.    Medications:  Current Outpatient Medications   Medication Sig Dispense Refill     Alendronate Sodium (FOSAMAX PO) Take 70 mg by mouth once a week       calcium-vitamin D (CALTRATE) 600-400 MG-UNIT per tablet Take 2 tablets by mouth daily       digoxin (LANOXIN) 250 MCG tablet TAKE 1 TABLET BY MOUTH ONCE DAILY*       ELIQUIS ANTICOAGULANT 2.5 MG tablet Take 1 Tablet (2.5 mg) by mouth two times a day.*       metoprolol succinate ER (TOPROL XL) 25 MG 24 hr tablet Take 25 mg by mouth       Multiple  "Vitamins-Minerals (PRESERVISION AREDS 2) CAPS        multivitamin, therapeutic (THERA-VIT) TABS Take 1 tablet by mouth daily       traZODone (DESYREL) 50 MG tablet Take 0.5 tablets (25 mg) by mouth At Bedtime 30 tablet 0     VITAMIN D, CHOLECALCIFEROL, PO Take 1,000 Units by mouth daily         Allergies:  Allergies   Allergen Reactions     Penicillin G Anaphylaxis     Red and inflamed        Social History:  Social History     Tobacco Use     Smoking status: Former     Types: Cigarettes     Smokeless tobacco: Never   Substance Use Topics     Alcohol use: Yes     Alcohol/week: 1.0 standard drink of alcohol     Types: 1 Standard drinks or equivalent per week       ROS: 10 point ROS neg other than the symptoms noted above in the HPI.    Physical Exam:    BP (!) 150/78 (BP Location: Left arm, Patient Position: Sitting, Cuff Size: Adult Regular)   Pulse 73   Ht 1.619 m (5' 3.75\")   Wt 62.6 kg (138 lb)   BMI 23.87 kg/m       Constitutional:  The patient was well-groomed and in no acute distress.     Skin: Normal:  warm and pink without rash    Neurologic: Alert and oriented x 3.  CN's III-XII within normal limits.  Voice normal.    Psychiatric: The patient's affect was calm, cooperative, and appropriate.     Communication:  Normal; communicates verbally, normal voice quality.    Respiratory: Breathing comfortably without stridor or exertion of accessory muscles.    Ears: Pinnae and tragus non-tender.  EAC's and TM's were clear.       Audiogram: 9/14/2023 - data independently reviewed  Normal sloping to profound SNHL bilaterally  WR right: 72% left: 40% at 80 dB  Tympanograms: type A right, type A left     Assessment and Plan:  1. Sensorineural hearing loss (SNHL) of both ears  Patient with bilateral sensorineural hearing loss with asymmetric word recognition score. Reviewed audiogram with patient and son today. Reviewed indication for a MRI due to asymmetric word recognition score. Hearing is symmetric and patient " does not notice a difference between ears. Will monitor hearing and WRS at this time with repeat audiogram in 1 year.     Due to poor word recognition score, discussed other options of aiding hearing if a traditional hearing aid in the left ear is not beneficial including BICROS and cochlear implant. Will monitor and work with audiology as scheduled.    Patient will follow up in 1 year with audiogram.    Melida Ponce DNP, APRN, CNP  Otolaryngology  Head & Neck Surgery  [phone]    30 minutes spent by me on the date of the encounter doing chart review, history and exam, documentation and further activities per the note        Again, thank you for allowing me to participate in the care of your patient.      Sincerely,    Armida Ponce, NP

## 2023-09-25 ENCOUNTER — THERAPY VISIT (OUTPATIENT)
Dept: PHYSICAL THERAPY | Facility: CLINIC | Age: 88
End: 2023-09-25
Payer: COMMERCIAL

## 2023-09-25 DIAGNOSIS — M25.551 RIGHT HIP PAIN: Primary | ICD-10-CM

## 2023-09-25 PROBLEM — M17.11 PRIMARY OSTEOARTHRITIS OF RIGHT KNEE: Status: RESOLVED | Noted: 2018-08-15 | Resolved: 2023-09-25

## 2023-09-25 PROCEDURE — 97110 THERAPEUTIC EXERCISES: CPT | Mod: GP | Performed by: PHYSICAL THERAPIST

## 2023-09-28 ENCOUNTER — TRANSFERRED RECORDS (OUTPATIENT)
Dept: AUDIOLOGY | Facility: CLINIC | Age: 88
End: 2023-09-28

## 2023-09-28 ENCOUNTER — OFFICE VISIT (OUTPATIENT)
Dept: AUDIOLOGY | Facility: CLINIC | Age: 88
End: 2023-09-28
Payer: COMMERCIAL

## 2023-09-28 DIAGNOSIS — H90.3 SENSORINEURAL HEARING LOSS, BILATERAL: ICD-10-CM

## 2023-09-28 DIAGNOSIS — G47.09 OTHER INSOMNIA: ICD-10-CM

## 2023-09-28 DIAGNOSIS — I48.20 CHRONIC ATRIAL FIBRILLATION (H): ICD-10-CM

## 2023-09-28 DIAGNOSIS — Z79.899 ENCOUNTER FOR MONITORING DIGOXIN THERAPY: Primary | ICD-10-CM

## 2023-09-28 DIAGNOSIS — Z51.81 ENCOUNTER FOR MONITORING DIGOXIN THERAPY: Primary | ICD-10-CM

## 2023-09-28 DIAGNOSIS — H91.93 BILATERAL HEARING LOSS, UNSPECIFIED HEARING LOSS TYPE: Primary | ICD-10-CM

## 2023-09-28 PROCEDURE — 92593 PR HEARING AID CHECK, BINAURAL: CPT | Performed by: AUDIOLOGIST

## 2023-09-28 NOTE — PROGRESS NOTES
AUDIOLOGY REPORT    BACKGROUND INFORMATION: Hailey Alexis was seen in the Audiology Clinic at [facility] on 9/28/2023 to establish care as a hearing aid patient.  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  The patient was fit with bilateral [employer] More 2 RITE hearing aids at an outside clinic.  The patient reports that she feels she is not hearing as well as she should with them.  She was accompanied to today's appointment by her grandson, Sunil.    It was reviewed with the patient that because she did not purchase the hearing aids, there would be a charge for today's appointment. She expressed understanding and wanted to proceed.    TEST RESULTS AND PROCEDURES: A hearing aid check was performed. The hearing aids were thoroughly cleaned and a listening check indicated that the devices sounded crisp and clear with no distortion or weakness noted. Simulated real-ear measures indicated that the devices are meeting/slightly exceeding NAL-NL1 target gain. This was reviewed with the patient who expressed understanding. Because her speech scores improved with less volume, adjustments were made including decreasing overall gain 3 dB in the right ear and 6 dB in the left ear and the patient reported better speech clarity and continued good volume.  A review of the volume control button was performed. She does have a ConnectClip at home. The purpose of the ConnectClip and situations where it may be beneficial were reviewed and she expressed understanding.    Realistic expectations given her word recognition in the left ear were discussed and she does have realistic expectations for performance with the device.    SUMMARY AND RECOMMENDATIONS: Establishing of hearing aid care was performed today.  Adjustments were made as noted  above and the patient will return as needed for cleaning and assessment of hearing aid.  Call this clinic with questions regarding today s visit.      Beau Escobar  Audiologist  [place] License  #[id number]

## 2023-09-29 RX ORDER — TRAZODONE HYDROCHLORIDE 50 MG/1
TABLET, FILM COATED ORAL
Qty: 90 TABLET | Refills: 1 | Status: SHIPPED | OUTPATIENT
Start: 2023-09-29 | End: 2023-10-04

## 2023-09-29 RX ORDER — DIGOXIN 250 MCG
250 TABLET ORAL DAILY
Qty: 30 TABLET | Refills: 0 | Status: SHIPPED | OUTPATIENT
Start: 2023-09-29 | End: 2023-10-04

## 2023-09-29 NOTE — TELEPHONE ENCOUNTER
Trazodone (Desyrel) 50 mg    Last Office Visit: 8/21/23  Future [facility] Appointments: 10/4/23  Medication last refilled: 8/9/23 #30 with 0 refill(s)    Prescription approved per [facility] Refill Protocol.    Katy Grier, ELIZABETHN, RN, CCM

## 2023-09-29 NOTE — TELEPHONE ENCOUNTER
Discussed with Dr. Duran. He approves 30 day refill of digoxin. Pt is scheduled or cardiology medication review appointment Wednesday 10/4 at 1000. Placed order for future digoxin level to be drawn at that time.    Cesar WILCOX, RN  09/29/23 1:41 PM

## 2023-09-29 NOTE — TELEPHONE ENCOUNTER
Spoke with son who confirms that they would like to have PCP manage pt's cardiology medications. Explained that we do not have a current digoxin level and we need to clarify her current dosage because Care Everywhere shows that she was prescribed 125 mcg daily but her son requested 250 mcg. Care Everywhere shows last digoxin level in 2021. Explained to son that this medication requires lab monitoring and that we will need to get Jenn scheduled for a cardiology med review next week with accompanying labs - he is out of town and asks for me to contact pt's grandson Sunil who also assists in her care to help her get scheduled.    Called [facility] and requested call back from RN to discuss pt's digoxin hx.    Cesar WILCOX, RN  09/29/23 12:14 PM

## 2023-09-29 NOTE — TELEPHONE ENCOUNTER
Trazodone (Desyrel) 50 mg    Last Office Visit: 8/21/23  Future [facility] Appointments: 10/4/23  Medication last refilled: 8/9/23 #30 with 0 refill(s)    Prescription approved per [facility] Refill Protocol.    Katy Grier, ELIZABETHN, RN, CCM

## 2023-09-29 NOTE — TELEPHONE ENCOUNTER
"Medication requested: digoxin (LANOXIN) 250 MCG tablet   Last office visit: 8/21/23  [facility] appointments: none  Medication last refilled: historical  Last qualifying labs: Last BMP 1/30/23, last digoxin level 6/25/21    Pt son Agus contacted pt's cardiology team at [facility] 9/28 and RN note states \"According to RX refill policy, patient needs labs (BMP, Digoxin level) and follow up apt with cardiology. Call placed to Jenn, spoke to her and her grandson Sunil. They are encouraged to make a follow up and get labs, however they prefer to have PMD refill medications at this time. Will callback if a cardiologist is needed\"    Routing refill request to provider for review/approval because:  Medication is reported/historical    Cesar WILCOX, RN  09/29/23 11:49 AM  "

## 2023-09-29 NOTE — TELEPHONE ENCOUNTER
LVM with pt son for clarification of dosage and to discuss scheduling follow-up appt for cardiology med review.    Cesar WILCOX, RN  09/29/23 12:01 PM

## 2023-10-01 NOTE — TELEPHONE ENCOUNTER
Pharmacy is calling and they are concerned that the dosage is too high.  Reviewed chart and noted provider notes and confimed that provider was aware of dosage and they will be checking blood levels this week.  He verbalized understanding and just needed to document confirmation that someone was aware as it is above the usual dosage.          Hollie Giron RN on 10/1/2023 at 10:01 AM

## 2023-10-04 ENCOUNTER — OFFICE VISIT (OUTPATIENT)
Dept: FAMILY MEDICINE | Facility: CLINIC | Age: 88
End: 2023-10-04
Payer: COMMERCIAL

## 2023-10-04 VITALS
WEIGHT: 142 LBS | BODY MASS INDEX: 24.57 KG/M2 | HEART RATE: 74 BPM | OXYGEN SATURATION: 96 % | DIASTOLIC BLOOD PRESSURE: 83 MMHG | SYSTOLIC BLOOD PRESSURE: 133 MMHG | TEMPERATURE: 97.1 F

## 2023-10-04 DIAGNOSIS — I48.20 CHRONIC ATRIAL FIBRILLATION (H): ICD-10-CM

## 2023-10-04 DIAGNOSIS — G47.09 OTHER INSOMNIA: ICD-10-CM

## 2023-10-04 RX ORDER — DIGOXIN 125 MCG
125 TABLET ORAL DAILY
Qty: 90 TABLET | Refills: 1 | Status: ON HOLD | OUTPATIENT
Start: 2023-10-04 | End: 2024-04-12

## 2023-10-04 RX ORDER — TRAZODONE HYDROCHLORIDE 50 MG/1
TABLET, FILM COATED ORAL
Qty: 90 TABLET | Refills: 1 | Status: SHIPPED | OUTPATIENT
Start: 2023-10-04 | End: 2023-11-20

## 2023-10-04 NOTE — NURSING NOTE
89 year old  Chief Complaint   Patient presents with    Refill Request       Blood pressure 133/83, pulse 74, temperature 97.1  F (36.2  C), temperature source Skin, weight 64.4 kg (142 lb), SpO2 96 %. Body mass index is 24.57 kg/m .  Patient Active Problem List   Diagnosis    Cerebrovascular accident (H)    Chronic atrial fibrillation (H)    Hard of hearing       Wt Readings from Last 2 Encounters:   10/04/23 64.4 kg (142 lb)   09/14/23 62.6 kg (138 lb)     BP Readings from Last 3 Encounters:   10/04/23 133/83   09/14/23 (!) 150/78   08/09/23 128/83         Current Outpatient Medications   Medication    Alendronate Sodium (FOSAMAX PO)    calcium-vitamin D (CALTRATE) 600-400 MG-UNIT per tablet    digoxin (LANOXIN) 250 MCG tablet    ELIQUIS ANTICOAGULANT 2.5 MG tablet    metoprolol succinate ER (TOPROL XL) 25 MG 24 hr tablet    Multiple Vitamins-Minerals (PRESERVISION AREDS 2) CAPS    multivitamin, therapeutic (THERA-VIT) TABS    traZODone (DESYREL) 50 MG tablet    VITAMIN D, CHOLECALCIFEROL, PO     No current facility-administered medications for this visit.       Social History     Tobacco Use    Smoking status: Former     Types: Cigarettes    Smokeless tobacco: Never   Substance Use Topics    Alcohol use: Yes     Alcohol/week: 1.0 standard drink of alcohol     Types: 1 Standard drinks or equivalent per week       Health Maintenance Due   Topic Date Due    ADVANCE CARE PLANNING  Never done    FALL RISK ASSESSMENT  Never done    MEDICARE ANNUAL WELLNESS VISIT  02/23/2012       No results found for: PAP      October 4, 2023 9:57 AM

## 2023-10-04 NOTE — PROGRESS NOTES
"Medical assistant intake:  Hailey Alexis is a 89 year old female who presents to [facility] today for Refill Request       -  ASSESSMENT and PLAN:    - Chronic Atrial Fibrillation on Digoxin (and on Toprol XL)  Spent much of the visit discussing the dose. Jenn and her grandson had it down as 250 mcg, but that exceeds the recommended dose for those over the age of 65 years (max is 125 mcg). Plus, some notes from Cardiology wrote in 125/day  After reviewing warnings and \"UpToDate\", we refilled at 125 mcg/day        - Insomnia, doing well on 25 mg of Trazodone/night  Refilled      Follow-up as needed    Gabe Etienne MD  Family Medicine and Sports Medicine  [facility]      SUBJECTIVE:   Jenn  presents today to receive a refill of her digoxin and also her trazodone.  She is accompanied today by her grandson, Sunil.    In terms of the digoxin, she believes that she is on 250 mcg/day and in fact that is what her most recent bottle shows.  However, in review of cardiology notes there are various times when they were refilling at 125 mcg/day.  She is also on Toprol XL to control her atrial fibrillation.    Her sleep has been much improved using 25 mg of trazodone per night.     Review Of Systems:    See subjective.   Has otherwise been in usual state of health, e.g.   Cardiovascular: negative  Respiratory: No shortness of breath, dyspnea on exertion, cough, or hemoptysis  Gastrointestinal: negative  Genitourinary: negative    Problem list per EMR:  Patient Active Problem List   Diagnosis    Cerebrovascular accident (H)    Chronic atrial fibrillation (H)    Hard of hearing       Current Outpatient Medications   Medication Sig Dispense Refill    Alendronate Sodium (FOSAMAX PO) Take 70 mg by mouth once a week      calcium-vitamin D (CALTRATE) 600-400 MG-UNIT per tablet Take 2 tablets by mouth daily      digoxin (LANOXIN) 125 MCG tablet Take 1 tablet (125 mcg) by mouth daily for 180 days 90 tablet 1    ELIQUIS " ANTICOAGULANT 2.5 MG tablet Take 1 Tablet (2.5 mg) by mouth two times a day.*      metoprolol succinate ER (TOPROL XL) 25 MG 24 hr tablet Take 25 mg by mouth      Multiple Vitamins-Minerals (PRESERVISION AREDS 2) CAPS       multivitamin, therapeutic (THERA-VIT) TABS Take 1 tablet by mouth daily      traZODone (DESYREL) 50 MG tablet Take 1/2 tablet (25 mg) by mouth At Bedtime 90 tablet 1    VITAMIN D, CHOLECALCIFEROL, PO Take 1,000 Units by mouth daily         Allergies   Allergen Reactions    Penicillin G Anaphylaxis     Red and inflamed        Social:     Unchanged    OBJECTIVE    Vitals: /83 (BP Location: Left arm, Patient Position: Sitting, Cuff Size: Adult Regular)   Pulse 74   Temp 97.1  F (36.2  C) (Skin)   Wt 64.4 kg (142 lb)   SpO2 96%   BMI 24.57 kg/m    BMI= Body mass index is 24.57 kg/m .   she appears in her usual state of health is a varus and mentally fully alert 89-year-old who is in no distress.    She does need some assistance getting up to her walker and her grandson is here for that.  Otherwise she is stable.    Cardiovascular-she appears in atrial fibrillation as per usual with a rate of about 74 bpm.  no murmurs.    Lungs were clear.    Extremities are without edema    SEE TOP OF NOTE FOR ASSESSMENT AND PLAN    --Gabe Etienne MD  [facility], Department of Family Medicine and Community Health

## 2023-10-04 NOTE — PATIENT INSTRUCTIONS
"   -  ASSESSMENT and PLAN:    - Chronic Atrial Fibrillation on Digoxin (and on Toprol XL)  Spent much of the visit discussing the dose. Jenn and her grandson had it down as 250 mcg, but that exceeds the recommended dose for those over the age of 65 years (max is 125 mcg). Plus, some notes from Cardiology wrote in 125/day  After reviewing warnings and \"UpToDate\", we refilled at 125 mcg/day  Also, will check a Digoxin level      - Insomnia, doing well on 25 mg of Trazodone/night  Refilled      Follow-up as needed    Gabe Etienne MD  Family Medicine and Sports Medicine  [facility]  "

## 2023-10-11 ENCOUNTER — THERAPY VISIT (OUTPATIENT)
Dept: PHYSICAL THERAPY | Facility: CLINIC | Age: 88
End: 2023-10-11
Payer: COMMERCIAL

## 2023-10-11 DIAGNOSIS — M25.551 HIP PAIN, RIGHT: Primary | ICD-10-CM

## 2023-10-11 PROCEDURE — 97110 THERAPEUTIC EXERCISES: CPT | Mod: GP | Performed by: PHYSICAL THERAPIST

## 2023-10-11 PROCEDURE — 97112 NEUROMUSCULAR REEDUCATION: CPT | Mod: GP | Performed by: PHYSICAL THERAPIST

## 2023-11-20 ENCOUNTER — OFFICE VISIT (OUTPATIENT)
Dept: FAMILY MEDICINE | Facility: CLINIC | Age: 88
End: 2023-11-20
Payer: COMMERCIAL

## 2023-11-20 VITALS
OXYGEN SATURATION: 99 % | WEIGHT: 142 LBS | DIASTOLIC BLOOD PRESSURE: 72 MMHG | SYSTOLIC BLOOD PRESSURE: 127 MMHG | HEART RATE: 58 BPM | RESPIRATION RATE: 18 BRPM | TEMPERATURE: 97.6 F | BODY MASS INDEX: 24.57 KG/M2

## 2023-11-20 DIAGNOSIS — J35.1 HYPERTROPHY OF TONSILS ALONE: Primary | ICD-10-CM

## 2023-11-20 DIAGNOSIS — G47.09 OTHER INSOMNIA: ICD-10-CM

## 2023-11-20 RX ORDER — TRAZODONE HYDROCHLORIDE 50 MG/1
TABLET, FILM COATED ORAL
Qty: 135 TABLET | Refills: 1 | Status: SHIPPED | OUTPATIENT
Start: 2023-11-20 | End: 2024-09-26

## 2023-11-20 NOTE — NURSING NOTE
"89 year old  Chief Complaint   Patient presents with    Consult     Derm referral -- fungal issue on toe, son had similar thing and derm was able to treat. Follow up on sleep meds. Lip -- novocaine shot turned into a sore on opposite side of lip.       Blood pressure 127/72, pulse 58, temperature 97.6  F (36.4  C), temperature source Temporal, resp. rate 18, weight 64.4 kg (142 lb), SpO2 99%. Body mass index is 24.57 kg/m .  Patient Active Problem List   Diagnosis    Cerebrovascular accident (H)    Chronic atrial fibrillation (H)    Hard of hearing    Hip pain, right       Wt Readings from Last 2 Encounters:   11/20/23 64.4 kg (142 lb)   10/04/23 64.4 kg (142 lb)     BP Readings from Last 3 Encounters:   11/20/23 127/72   10/04/23 133/83   09/14/23 (!) 150/78         Current Outpatient Medications   Medication    Alendronate Sodium (FOSAMAX PO)    calcium-vitamin D (CALTRATE) 600-400 MG-UNIT per tablet    digoxin (LANOXIN) 125 MCG tablet    ELIQUIS ANTICOAGULANT 2.5 MG tablet    metoprolol succinate ER (TOPROL XL) 25 MG 24 hr tablet    Multiple Vitamins-Minerals (PRESERVISION AREDS 2) CAPS    multivitamin, therapeutic (THERA-VIT) TABS    traZODone (DESYREL) 50 MG tablet    VITAMIN D, CHOLECALCIFEROL, PO     No current facility-administered medications for this visit.       Social History     Tobacco Use    Smoking status: Former     Types: Cigarettes    Smokeless tobacco: Never   Substance Use Topics    Alcohol use: Yes     Alcohol/week: 1.0 standard drink of alcohol     Types: 1 Standard drinks or equivalent per week       Health Maintenance Due   Topic Date Due    ADVANCE CARE PLANNING  Never done    FALL RISK ASSESSMENT  Never done    IPV IMMUNIZATION (2 of 3 - Adult catch-up series) 06/13/2006    MEDICARE ANNUAL WELLNESS VISIT  02/23/2012       No results found for: \"PAP\"      November 20, 2023 4:15 PM    "

## 2023-11-20 NOTE — PATIENT INSTRUCTIONS
-  ASSESSMENT and PLAN:    - Lower lip infection after novocaine injection  Will try Mupirocin ointment 3 times/day.   Let me know if no improvement      - Onychomycosis with thicking of toenails on the LEFT foot  Discussed but discouraged use of oral anti-fungals given the liver toxicity  Referred to Podiatry for toenail      - Insomnia, requiring 50 or more mg of Trazodone and with a 3-4 hour delay to time of onset  Take 50mg at around 6:30pm and then another 1/2 tab (25 mg if needed at bedtime around 10:30pm).   Let me know if it doesn't help      Follow-up as needed    Gabe Etienne MD  Family Medicine and Sports Medicine  [facility]

## 2023-11-20 NOTE — PROGRESS NOTES
Medical assistant intake:  Hailey Alexis is a 89 year old female who presents to [facility] today for Consult (Derm referral -- fungal issue on toe, son had similar thing and derm was able to treat. Follow up on sleep meds. Lip -- novocaine shot turned into a sore on opposite side of lip.)       -  ASSESSMENT and PLAN:    - Lower lip infection after novocaine injection  Will try Mupirocin ointment 3 times/day.   Let me know if no improvement      - Onychomycosis with thicking of toenails on the LEFT foot  Discussed but discouraged use of oral anti-fungals given the liver toxicity  Referred to Podiatry for toenail      - Insomnia, requiring 50 or more mg of Trazodone and with a 3-4 hour delay to time of onset  Take 50mg at around 6:30pm and then another 1/2 tab (25 mg if needed at bedtime around 10:30pm).   Let me know if it doesn't help      Follow-up as needed    Gabe Etienne MD  Family Medicine and Sports Medicine  [facility]      SUBJECTIVE:   Jenn is here today with:   Sore on lower lip after Novacaine shot a week ago and developed a rash near to the area where the needle went through the skin.   LEFT foot with likely onychomycosis.   She has thickened toenails and she is interested in discussing treatments.  Insomnia -   States that the trazodone at 50 mg dose is taking about 4 hours to start working.  She is currently taking it as she is going to bed at 10:30 at night.    Review Of Systems:    See subjective.   Has otherwise been in usual state of health, e.g.   Cardiovascular: negative  Respiratory: No shortness of breath, dyspnea on exertion, cough, or hemoptysis  Gastrointestinal: negative  Genitourinary: negative    Problem list per EMR:  Patient Active Problem List   Diagnosis    Cerebrovascular accident (H)    Chronic atrial fibrillation (H)    Hard of hearing    Hip pain, right       Current Outpatient Medications   Medication Sig Dispense Refill    Alendronate Sodium (FOSAMAX PO)  Take 70 mg by mouth once a week      calcium-vitamin D (CALTRATE) 600-400 MG-UNIT per tablet Take 2 tablets by mouth daily      digoxin (LANOXIN) 125 MCG tablet Take 1 tablet (125 mcg) by mouth daily for 180 days 90 tablet 1    ELIQUIS ANTICOAGULANT 2.5 MG tablet Take 1 Tablet (2.5 mg) by mouth two times a day.*      metoprolol succinate ER (TOPROL XL) 25 MG 24 hr tablet Take 25 mg by mouth      Multiple Vitamins-Minerals (PRESERVISION AREDS 2) CAPS       multivitamin, therapeutic (THERA-VIT) TABS Take 1 tablet by mouth daily      traZODone (DESYREL) 50 MG tablet Take 1.5 tabs at around 6-7pm 135 tablet 1    VITAMIN D, CHOLECALCIFEROL, PO Take 1,000 Units by mouth daily         Allergies   Allergen Reactions    Penicillin G Anaphylaxis     Red and inflamed        Social:     Unchanged.  She is accompanied again by her son, Agus    OBJECTIVE    Vitals: /72 (BP Location: Left arm, Patient Position: Sitting, Cuff Size: Adult Large)   Pulse 58   Temp 97.6  F (36.4  C) (Temporal)   Resp 18   Wt 64.4 kg (142 lb)   SpO2 99%   BMI 24.57 kg/m    BMI= Body mass index is 24.57 kg/m .    She appears in her usual state of health.  Hard of hearing but otherwise perfectly cognitively intact.  As usual, she is smiling and with an excellent sense of humor.   her lower lip on the left side has a 4 to 5 cm in diameter darkish area which came from the needle injection.  It is slightly warm.  No further extending redness.  No lymphadenopathy.    Toenails of both feet are slightly hypertrophied in the first and second toenail.    SEE TOP OF NOTE FOR ASSESSMENT AND PLAN    --Gabe Etienne MD  [facility], Department of Family Medicine and Community Health

## 2024-01-11 ENCOUNTER — OFFICE VISIT (OUTPATIENT)
Dept: AUDIOLOGY | Facility: CLINIC | Age: 89
End: 2024-01-11
Payer: COMMERCIAL

## 2024-01-11 DIAGNOSIS — H90.3 SENSORINEURAL HEARING LOSS, BILATERAL: Primary | ICD-10-CM

## 2024-01-11 PROCEDURE — 99207 PR ASSESSMENT FOR HEARING AID: CPT | Performed by: AUDIOLOGIST

## 2024-01-11 NOTE — TELEPHONE ENCOUNTER
DIAGNOSIS: Hypertrophy of tonsils alone\Gabe Etienne MD \ [facility]\ ortho con    APPOINTMENT DATE: 1/29/24   NOTES STATUS DETAILS   OFFICE NOTE from referring provider Internal 11/20/23 - Gabe Etienne MD - [facility] PCP    MEDICATION LIST Internal    MRI N/A    CT SCAN N/A    XRAYS (IMAGES & REPORTS) N/A

## 2024-01-12 NOTE — PROGRESS NOTES
AUDIOLOGY REPORT    SUBJECTIVE:   Hailey Alexis was seen in the Audiology Clinic at [facility] on 1/11/24 for a hearing aid check.  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  The patient was fit with bilateral [employer] More 2 RITE hearing aids at an outside clinic.  The patient reports that she lost her right hearing aid.     It was reviewed with the patient that because she did not purchase the hearing aids, there would be a charge for today's appointment. She expressed understanding and wanted to proceed.    OBJECTIVE:   I contacted [employer] and her lost hearing aid is still under warranty and can be replaced with the loss and damage. The serial number of the lost hearing aid is [id number]. New earmold will be ordered from scan on file. (Previous serial number [id number])    ASSESSMENT:   No charge hearing aid check completed today.    PLAN:    Patient will be contacted when hearing aid is in clinic for .  Call this clinic with questions regarding today s visit.      Tang Blair, [facility]  Licensed Audiologist  [place] License #[id number]

## 2024-01-29 ENCOUNTER — PRE VISIT (OUTPATIENT)
Dept: ORTHOPEDICS | Facility: CLINIC | Age: 89
End: 2024-01-29

## 2024-01-29 ENCOUNTER — OFFICE VISIT (OUTPATIENT)
Dept: ORTHOPEDICS | Facility: CLINIC | Age: 89
End: 2024-01-29
Attending: FAMILY MEDICINE
Payer: COMMERCIAL

## 2024-01-29 DIAGNOSIS — B35.1 OM (ONYCHOMYCOSIS): ICD-10-CM

## 2024-01-29 DIAGNOSIS — I73.89 OTHER SPECIFIED PERIPHERAL VASCULAR DISEASES (H): Primary | ICD-10-CM

## 2024-01-29 DIAGNOSIS — I73.9 PVD (PERIPHERAL VASCULAR DISEASE) (H): ICD-10-CM

## 2024-01-29 PROCEDURE — 11720 DEBRIDE NAIL 1-5: CPT | Mod: Q8 | Performed by: PODIATRIST

## 2024-01-29 PROCEDURE — 99203 OFFICE O/P NEW LOW 30 MIN: CPT | Mod: 25 | Performed by: PODIATRIST

## 2024-01-29 NOTE — PROGRESS NOTES
Date of Service: 1/29/2024    Chief Complaint:   Chief Complaint   Patient presents with    Consult     Toenails. Referred by: Dr. Etienne.         HPI: Hailey is a 89 year old female who presents today for further evaluation of onychomycosis. She is with her son today. Notes that the left hallux nail is thick and painful. Difficult to trim. Son took PO Lamisil and wondering if this is an option for her.     Review of Systems: No n/v/d/f/c/ns/sob/cp    PMH:   Past Medical History:   Diagnosis Date    Cerebrovascular accident (H)     Chronic atrial fibrillation (H)     Hard of hearing        PSxH: No past surgical history on file.    Allergies: Penicillin g    SH:   Social History     Socioeconomic History    Marital status:      Spouse name: Not on file    Number of children: Not on file    Years of education: Not on file    Highest education level: Not on file   Occupational History    Not on file   Tobacco Use    Smoking status: Former     Types: Cigarettes    Smokeless tobacco: Never   Substance and Sexual Activity    Alcohol use: Yes     Alcohol/week: 1.0 standard drink of alcohol     Types: 1 Standard drinks or equivalent per week    Drug use: Not on file    Sexual activity: Not on file   Other Topics Concern    Not on file   Social History Narrative     twice. Now, has a new boyfriend who is 13 years younger.     Has 4 children.     Lives in [place] with a grandson. Has an active social life.      Social Determinants of Health     Financial Resource Strain: Not on file   Food Insecurity: Not on file   Transportation Needs: Not on file   Physical Activity: Not on file   Stress: Not on file   Social Connections: Not on file   Interpersonal Safety: Not on file   Housing Stability: Not on file       FH: No family history on file.    Objective:  Data Unavailable Data Unavailable Data Unavailable Data Unavailable Data Unavailable 0 lbs 0 oz    PT and DP pulses are 2/4 bilaterally. CRT is 1  second. Diminished pedal hair. Significant telangectasia and spider veins on BL feet and ankles.   Gross sensation is intact bilaterally.   Equinus is noted bilaterally. No pain with active or passive ROM of the ankle, MTJ, 1st ray, or halluces bilaterally,.   Nail of the left hallux is thickened, brittle, discolored, with subungual debris. Nail dystrophy x 4.  No open lesions are noted.     Assessment:   Encounter Diagnoses   Name Primary?    Other specified peripheral vascular diseases (H24) Yes    PVD (peripheral vascular disease) (H24)     OM (onychomycosis)          Plan:  - Pt seen and evaluated.  - We discussed that she is not a good candidate for PO Lamisil. Recommend keeping the nail debrided.   - Nail debrided x 1. Dystrophic nails trimmed x 4.  - See again PRN.

## 2024-01-29 NOTE — LETTER
1/29/2024         RE: Hailey Alexis  [address]  [facility] [id number]        Dear Colleague,    Thank you for referring your patient, Hailey Alexis, to the [facility] [place]. Please see a copy of my visit note below.    Date of Service: 1/29/2024    Chief Complaint:   Chief Complaint   Patient presents with    Consult     Toenails. Referred by: Dr. Etienne.         HPI: Hailey is a 89 year old female who presents today for further evaluation of onychomycosis. She is with her son today. Notes that the left hallux nail is thick and painful. Difficult to trim. Son took PO Lamisil and wondering if this is an option for her.     Review of Systems: No n/v/d/f/c/ns/sob/cp    PMH:   Past Medical History:   Diagnosis Date    Cerebrovascular accident (H)     Chronic atrial fibrillation (H)     Hard of hearing        PSxH: No past surgical history on file.    Allergies: Penicillin g    SH:   Social History     Socioeconomic History    Marital status:      Spouse name: Not on file    Number of children: Not on file    Years of education: Not on file    Highest education level: Not on file   Occupational History    Not on file   Tobacco Use    Smoking status: Former     Types: Cigarettes    Smokeless tobacco: Never   Substance and Sexual Activity    Alcohol use: Yes     Alcohol/week: 1.0 standard drink of alcohol     Types: 1 Standard drinks or equivalent per week    Drug use: Not on file    Sexual activity: Not on file   Other Topics Concern    Not on file   Social History Narrative     twice. Now, has a new boyfriend who is 13 years younger.     Has 4 children.     Lives in [facility] [facility] [place] with a grandson. Has an active social life.      Social Determinants of Health     Financial Resource Strain: Not on file   Food Insecurity: Not on file   Transportation Needs: Not on file   Physical Activity: Not on file   Stress: Not on file   Social Connections: Not  on file   Interpersonal Safety: Not on file   Housing Stability: Not on file       FH: No family history on file.    Objective:  Data Unavailable Data Unavailable Data Unavailable Data Unavailable Data Unavailable 0 lbs 0 oz    PT and DP pulses are 2/4 bilaterally. CRT is 1 second. Diminished pedal hair. Significant telangectasia and spider veins on BL feet and ankles.   Gross sensation is intact bilaterally.   Equinus is noted bilaterally. No pain with active or passive ROM of the ankle, MTJ, 1st ray, or halluces bilaterally,.   Nail of the left hallux is thickened, brittle, discolored, with subungual debris. Nail dystrophy x 4.  No open lesions are noted.     Assessment:   Encounter Diagnoses   Name Primary?    Other specified peripheral vascular diseases (H24) Yes    PVD (peripheral vascular disease) (H24)     OM (onychomycosis)          Plan:  - Pt seen and evaluated.  - We discussed that she is not a good candidate for PO Lamisil. Recommend keeping the nail debrided.   - Nail debrided x 1. Dystrophic nails trimmed x 4.  - See again PRN.             Jeanmarie Vargas DPM

## 2024-02-08 ENCOUNTER — MYC MEDICAL ADVICE (OUTPATIENT)
Dept: AUDIOLOGY | Facility: CLINIC | Age: 89
End: 2024-02-08
Payer: COMMERCIAL

## 2024-02-26 NOTE — TELEPHONE ENCOUNTER
[facility] Call Center    Phone Message    May a detailed message be left on voicemail: yes     Reason for Call: Other: Pt's son called in regards to pt's hearing aid. Pt has lost the other hearing aid now.Need to order that one and they would like to discuss ordering a back up set. Son would like a call back from care team to discuss     Action Taken: Other: [facility] [employer]    Travel Screening: Not Applicable

## 2024-02-28 ENCOUNTER — OFFICE VISIT (OUTPATIENT)
Dept: FAMILY MEDICINE | Facility: CLINIC | Age: 89
End: 2024-02-28
Payer: COMMERCIAL

## 2024-02-28 ENCOUNTER — OFFICE VISIT (OUTPATIENT)
Dept: AUDIOLOGY | Facility: CLINIC | Age: 89
End: 2024-02-28

## 2024-02-28 VITALS
SYSTOLIC BLOOD PRESSURE: 138 MMHG | RESPIRATION RATE: 18 BRPM | HEART RATE: 73 BPM | TEMPERATURE: 97.3 F | DIASTOLIC BLOOD PRESSURE: 81 MMHG | OXYGEN SATURATION: 96 %

## 2024-02-28 DIAGNOSIS — H90.3 SENSORY HEARING LOSS, BILATERAL: Primary | ICD-10-CM

## 2024-02-28 DIAGNOSIS — T14.8XXA OPEN WOUND: Primary | ICD-10-CM

## 2024-02-28 PROCEDURE — V5299 HEARING SERVICE: HCPCS | Performed by: AUDIOLOGIST

## 2024-02-28 RX ORDER — MUPIROCIN 20 MG/G
OINTMENT TOPICAL 3 TIMES DAILY
Qty: 22 G | Refills: 0 | Status: ON HOLD | OUTPATIENT
Start: 2024-02-28 | End: 2024-04-12

## 2024-02-28 NOTE — NURSING NOTE
"89 year old  Chief Complaint   Patient presents with    Follow Up     Cut on right shin -- has scabbed, appears erythematous, denies warmth around site. Denies night sweats.       Blood pressure 138/81, pulse 73, temperature 97.3  F (36.3  C), temperature source Temporal, resp. rate 18, SpO2 96%. There is no height or weight on file to calculate BMI.  Patient Active Problem List   Diagnosis    Cerebrovascular accident (H)    Chronic atrial fibrillation (H)    Hard of hearing    Hip pain, right       Wt Readings from Last 2 Encounters:   11/20/23 64.4 kg (142 lb)   10/04/23 64.4 kg (142 lb)     BP Readings from Last 3 Encounters:   02/28/24 138/81   11/20/23 127/72   10/04/23 133/83         Current Outpatient Medications   Medication    Alendronate Sodium (FOSAMAX PO)    calcium-vitamin D (CALTRATE) 600-400 MG-UNIT per tablet    digoxin (LANOXIN) 125 MCG tablet    ELIQUIS ANTICOAGULANT 2.5 MG tablet    Multiple Vitamins-Minerals (PRESERVISION AREDS 2) CAPS    multivitamin, therapeutic (THERA-VIT) TABS    traZODone (DESYREL) 50 MG tablet    VITAMIN D, CHOLECALCIFEROL, PO    metoprolol succinate ER (TOPROL XL) 25 MG 24 hr tablet     No current facility-administered medications for this visit.       Social History     Tobacco Use    Smoking status: Former     Types: Cigarettes    Smokeless tobacco: Never   Substance Use Topics    Alcohol use: Yes     Alcohol/week: 1.0 standard drink of alcohol     Types: 1 Standard drinks or equivalent per week       Health Maintenance Due   Topic Date Due    ADVANCE CARE PLANNING  Never done    FALL RISK ASSESSMENT  Never done    IPV IMMUNIZATION (2 of 3 - Adult catch-up series) 06/13/2006    MEDICARE ANNUAL WELLNESS VISIT  02/23/2012       No results found for: \"PAP\"      February 28, 2024 9:22 AM    "

## 2024-02-28 NOTE — PROGRESS NOTES
ASSESSMENT and PLAN:       RIGHT lower leg scab after contusion with a bedframe  Area was dressed with antibiotic ointment, adaptic and coban wrap.   Instructed in how to reapply as needed for the next few weeks.   Short term memory loss  Her long term memory is intact  Suggested wearing hearing aids as much as possible  Also, take Vitamin E, 1000 international unit(s)/ 1-2 times per day.   Stay active physically and mentally  If you want a referral to the memory care specialists, let us know  For sleep, continue with the Trazodone. Can add Melatonin. I suggest starting with 1 mg before bedtime, then increase by 1 mg until you find a dosage that helps. General is about 3 mg per night      Follow-up as needed    Gabe Etienne MD  Family Medicine and Sports Medicine  [facility]      Medical assistant intake:  Hailey Alexis is a 89 year old female who presents to [facility] today for Follow Up (Cut on right shin -- has scabbed, appears erythematous, denies warmth around site. Denies night sweats.)      SUBJECTIVE:   Jenn is here  to discuss a few issues  .    Main reason for the visit is a scab on her right lower leg.  This occurred after she struck her leg on a bed frame several days ago.  It has improved slightly over the past few days but there is still a large scab.  She is otherwise feeling well.  No changes in her gait.      She is here with her son, Agus and there is a concern of her loss of some short-term memory.  Her long-term memory is perfectly intact.  She continues to read and have an active social life.  The memory loss tends to be for items such as what she had for dinner the night before.  It is not particularly bothersome to her.      Her sleep continues to be difficult.  She is using the trazodone but occasionally has trouble falling asleep.  She was wondering whether she could take melatonin    Review Of Systems:    See subjective.   Has otherwise been in usual state of health,  e.g.   Cardiovascular: negative  Respiratory: No shortness of breath, dyspnea on exertion, cough, or hemoptysis  Gastrointestinal: negative  Genitourinary: negative    Problem list per EMR:  Patient Active Problem List   Diagnosis    Cerebrovascular accident (H)    Chronic atrial fibrillation (H)    Hard of hearing    Hip pain, right       Current Outpatient Medications   Medication Sig Dispense Refill    Alendronate Sodium (FOSAMAX PO) Take 70 mg by mouth once a week      calcium-vitamin D (CALTRATE) 600-400 MG-UNIT per tablet Take 2 tablets by mouth daily      digoxin (LANOXIN) 125 MCG tablet Take 1 tablet (125 mcg) by mouth daily for 180 days 90 tablet 1    ELIQUIS ANTICOAGULANT 2.5 MG tablet Take 1 Tablet (2.5 mg) by mouth two times a day.*      Multiple Vitamins-Minerals (PRESERVISION AREDS 2) CAPS       multivitamin, therapeutic (THERA-VIT) TABS Take 1 tablet by mouth daily      traZODone (DESYREL) 50 MG tablet Take 1.5 tabs at around 6-7pm 135 tablet 1    VITAMIN D, CHOLECALCIFEROL, PO Take 1,000 Units by mouth daily      metoprolol succinate ER (TOPROL XL) 25 MG 24 hr tablet Take 25 mg by mouth         Allergies   Allergen Reactions    Penicillin G Anaphylaxis     Red and inflamed        Social:    unchanged    OBJECTIVE    Vitals: /81 (BP Location: Left arm, Patient Position: Sitting, Cuff Size: Adult Regular)   Pulse 73   Temp 97.3  F (36.3  C) (Temporal)   Resp 18   SpO2 96%   BMI= There is no height or weight on file to calculate BMI.   she appears as per usual is a vigorous 89-year-old who is in no distress.  She has no difficulty remembering current events or carrying on a normal conversation.  She was able to weight-bear without difficulty.  Her right lower leg appears as in the photo below.  There is no extending warmth.  The area of redness is about 5 cm x 4 cm with a central scab as seen in the picture.          SEE TOP OF NOTE FOR ASSESSMENT AND PLAN    --Gabe Etienne MD  [place]  [facility], Department of Family Medicine and Community Health

## 2024-02-28 NOTE — PATIENT INSTRUCTIONS
ASSESSMENT and PLAN:       RIGHT lower leg scab after contusion with a bedframe  Area was dressed with antibiotic ointment, adaptic and coban wrap.   Instructed in how to reapply as needed for the next few weeks.   Short term memory loss  Her long term memory is intact  Suggested wearing hearing aids as much as possible  Also, take Vitamin E, 1000 international unit(s)/ 1-2 times per day.   Stay active physically and mentally  If you want a referral to the memory care specialists, let us know  For sleep, continue with the Trazodone. Can add Melatonin. I suggest starting with 1 mg before bedtime, then increase by 1 mg until you find a dosage that helps. General is about 3 mg per night      Follow-up as needed    Gabe Etienne MD  Family Medicine and Sports Medicine  [facility]

## 2024-02-28 NOTE — TELEPHONE ENCOUNTER
LVM for patient's son letting him know that we can order the L&D left hearing aid and earmold for $350.     If they would like a back up set of the SAME MODEL of hearing aids we can order that without a consultation appointment for $5800, however often times patients can get confused with multiple sets of hearing aids. If they would like different hearing aids as backup they would need a hearing test and hearing aid consultation appointment.    Tang Blair, [facility]  Licensed Audiologist  [place] License #[id number]

## 2024-04-11 ENCOUNTER — TELEPHONE (OUTPATIENT)
Dept: FAMILY MEDICINE | Facility: CLINIC | Age: 89
End: 2024-04-11

## 2024-04-11 ENCOUNTER — HOSPITAL ENCOUNTER (INPATIENT)
Facility: CLINIC | Age: 89
LOS: 5 days | Discharge: HOME | DRG: 603 | End: 2024-04-18
Attending: EMERGENCY MEDICINE | Admitting: INTERNAL MEDICINE
Payer: COMMERCIAL

## 2024-04-11 ENCOUNTER — OFFICE VISIT (OUTPATIENT)
Dept: FAMILY MEDICINE | Facility: CLINIC | Age: 89
End: 2024-04-11
Payer: COMMERCIAL

## 2024-04-11 ENCOUNTER — APPOINTMENT (OUTPATIENT)
Dept: GENERAL RADIOLOGY | Facility: CLINIC | Age: 89
DRG: 603 | End: 2024-04-11
Attending: PHYSICIAN ASSISTANT
Payer: COMMERCIAL

## 2024-04-11 VITALS
TEMPERATURE: 97 F | SYSTOLIC BLOOD PRESSURE: 114 MMHG | HEART RATE: 107 BPM | DIASTOLIC BLOOD PRESSURE: 69 MMHG | OXYGEN SATURATION: 94 %
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
## 2024-04-11 DIAGNOSIS — Z86.79 HX OF ATRIAL FLUTTER: Primary | ICD-10-CM
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
## 2024-04-11 DIAGNOSIS — L03.114 CELLULITIS OF LEFT UPPER EXTREMITY: ICD-10-CM

## 2024-04-11 LAB
ALBUMIN SERPL BCG-MCNC: 4 G/DL (ref 3.5–5.2)
ALP SERPL-CCNC: 141 U/L (ref 40–150)
ALT SERPL W P-5'-P-CCNC: 17 U/L (ref 0–50)
ANION GAP SERPL CALCULATED.3IONS-SCNC: 10 MMOL/L (ref 7–15)
AST SERPL W P-5'-P-CCNC: 30 U/L (ref 0–45)
BASOPHILS # BLD AUTO: 0.1 10E3/UL (ref 0–0.2)
BASOPHILS NFR BLD AUTO: 0 %
BILIRUB SERPL-MCNC: 0.8 MG/DL
BUN SERPL-MCNC: 21.9 MG/DL (ref 8–23)
CALCIUM SERPL-MCNC: 9.3 MG/DL (ref 8.2–9.6)
CHLORIDE SERPL-SCNC: 102 MMOL/L (ref 98–107)
CREAT SERPL-MCNC: 0.83 MG/DL (ref 0.51–0.95)
CRP SERPL-MCNC: 117.49 MG/L
DEPRECATED HCO3 PLAS-SCNC: 27 MMOL/L (ref 22–29)
EGFRCR SERPLBLD CKD-EPI 2021: 65 ML/MIN/1.73M2
EOSINOPHIL # BLD AUTO: 0.2 10E3/UL (ref 0–0.7)
EOSINOPHIL NFR BLD AUTO: 1 %
ERYTHROCYTE [DISTWIDTH] IN BLOOD BY AUTOMATED COUNT: 14.2 % (ref 10–15)
GLUCOSE SERPL-MCNC: 117 MG/DL (ref 70–99)
HCT VFR BLD AUTO: 46.8 % (ref 35–47)
HGB BLD-MCNC: 15.3 G/DL (ref 11.7–15.7)
HOLD SPECIMEN: NORMAL
IMM GRANULOCYTES # BLD: 0.1 10E3/UL
IMM GRANULOCYTES NFR BLD: 0 %
LYMPHOCYTES # BLD AUTO: 1.8 10E3/UL (ref 0.8–5.3)
LYMPHOCYTES NFR BLD AUTO: 11 %
MCH RBC QN AUTO: 30.5 PG (ref 26.5–33)
MCHC RBC AUTO-ENTMCNC: 32.7 G/DL (ref 31.5–36.5)
MCV RBC AUTO: 93 FL (ref 78–100)
MONOCYTES # BLD AUTO: 1.6 10E3/UL (ref 0–1.3)
MONOCYTES NFR BLD AUTO: 10 %
NEUTROPHILS # BLD AUTO: 12.7 10E3/UL (ref 1.6–8.3)
NEUTROPHILS NFR BLD AUTO: 78 %
NRBC # BLD AUTO: 0 10E3/UL
NRBC BLD AUTO-RTO: 0 /100
PLATELET # BLD AUTO: 262 10E3/UL (ref 150–450)
POTASSIUM SERPL-SCNC: 4.5 MMOL/L (ref 3.4–5.3)
PROT SERPL-MCNC: 7.6 G/DL (ref 6.4–8.3)
RBC # BLD AUTO: 5.01 10E6/UL (ref 3.8–5.2)
SODIUM SERPL-SCNC: 139 MMOL/L (ref 135–145)
WBC # BLD AUTO: 16.4 10E3/UL (ref 4–11)

## 2024-04-11 PROCEDURE — 250N000011 HC RX IP 250 OP 636: Performed by: PHYSICIAN ASSISTANT

## 2024-04-11 PROCEDURE — 250N000009 HC RX 250: Performed by: PHYSICIAN ASSISTANT

## 2024-04-11 PROCEDURE — 99285 EMERGENCY DEPT VISIT HI MDM: CPT | Mod: 25 | Performed by: PHYSICIAN ASSISTANT

## 2024-04-11 PROCEDURE — 99285 EMERGENCY DEPT VISIT HI MDM: CPT | Performed by: PHYSICIAN ASSISTANT

## 2024-04-11 PROCEDURE — 250N000013 HC RX MED GY IP 250 OP 250 PS 637: Performed by: PHYSICIAN ASSISTANT

## 2024-04-11 PROCEDURE — 96375 TX/PRO/DX INJ NEW DRUG ADDON: CPT

## 2024-04-11 PROCEDURE — G0378 HOSPITAL OBSERVATION PER HR: HCPCS

## 2024-04-11 PROCEDURE — 96365 THER/PROPH/DIAG IV INF INIT: CPT | Performed by: PHYSICIAN ASSISTANT

## 2024-04-11 PROCEDURE — 99222 1ST HOSP IP/OBS MODERATE 55: CPT | Mod: FS | Performed by: INTERNAL MEDICINE

## 2024-04-11 PROCEDURE — 82374 ASSAY BLOOD CARBON DIOXIDE: CPT | Performed by: PHYSICIAN ASSISTANT

## 2024-04-11 PROCEDURE — 85025 COMPLETE CBC W/AUTO DIFF WBC: CPT | Performed by: PHYSICIAN ASSISTANT

## 2024-04-11 PROCEDURE — 84155 ASSAY OF PROTEIN SERUM: CPT | Performed by: PHYSICIAN ASSISTANT

## 2024-04-11 PROCEDURE — 99207 PR APP CREDIT; MD BILLING SHARED VISIT: CPT | Performed by: PHYSICIAN ASSISTANT

## 2024-04-11 PROCEDURE — 96366 THER/PROPH/DIAG IV INF ADDON: CPT

## 2024-04-11 PROCEDURE — 36415 COLL VENOUS BLD VENIPUNCTURE: CPT | Performed by: PHYSICIAN ASSISTANT

## 2024-04-11 PROCEDURE — 80162 ASSAY OF DIGOXIN TOTAL: CPT | Performed by: PHYSICIAN ASSISTANT

## 2024-04-11 PROCEDURE — 73130 X-RAY EXAM OF HAND: CPT | Mod: LT

## 2024-04-11 PROCEDURE — 86140 C-REACTIVE PROTEIN: CPT | Performed by: PHYSICIAN ASSISTANT

## 2024-04-11 PROCEDURE — 96367 TX/PROPH/DG ADDL SEQ IV INF: CPT

## 2024-04-11 RX ORDER — DOXYCYCLINE 100 MG/1
100 CAPSULE ORAL 2 TIMES DAILY
Qty: 28 CAPSULE | Refills: 0 | Status: SHIPPED | OUTPATIENT
Start: 2024-04-11 | End: 2024-04-25

## 2024-04-11 RX ORDER — ONDANSETRON 4 MG/1
4 TABLET, ORALLY DISINTEGRATING ORAL EVERY 6 HOURS PRN
Status: DISCONTINUED | OUTPATIENT
Start: 2024-04-11 | End: 2024-04-18 | Stop reason: HOSPADM

## 2024-04-11 RX ORDER — METRONIDAZOLE 500 MG/100ML
500 INJECTION, SOLUTION INTRAVENOUS EVERY 12 HOURS
Status: DISCONTINUED | OUTPATIENT
Start: 2024-04-11 | End: 2024-04-11

## 2024-04-11 RX ORDER — METRONIDAZOLE 500 MG/100ML
500 INJECTION, SOLUTION INTRAVENOUS EVERY 8 HOURS
Status: DISCONTINUED | OUTPATIENT
Start: 2024-04-11 | End: 2024-04-14

## 2024-04-11 RX ORDER — ACETAMINOPHEN 650 MG/1
650 SUPPOSITORY RECTAL EVERY 4 HOURS PRN
Status: DISCONTINUED | OUTPATIENT
Start: 2024-04-11 | End: 2024-04-11

## 2024-04-11 RX ORDER — VITAMIN B COMPLEX
25 TABLET ORAL DAILY
Status: DISCONTINUED | OUTPATIENT
Start: 2024-04-12 | End: 2024-04-18 | Stop reason: HOSPADM

## 2024-04-11 RX ORDER — ONDANSETRON 2 MG/ML
4 INJECTION INTRAMUSCULAR; INTRAVENOUS EVERY 6 HOURS PRN
Status: DISCONTINUED | OUTPATIENT
Start: 2024-04-11 | End: 2024-04-18 | Stop reason: HOSPADM

## 2024-04-11 RX ORDER — TRAZODONE HYDROCHLORIDE 50 MG/1
50 TABLET, FILM COATED ORAL
Status: DISCONTINUED | OUTPATIENT
Start: 2024-04-11 | End: 2024-04-18 | Stop reason: HOSPADM

## 2024-04-11 RX ORDER — ACETAMINOPHEN 325 MG/1
650 TABLET ORAL EVERY 4 HOURS PRN
Status: DISCONTINUED | OUTPATIENT
Start: 2024-04-11 | End: 2024-04-11

## 2024-04-11 RX ORDER — CLINDAMYCIN PHOSPHATE 600 MG/50ML
600 INJECTION, SOLUTION INTRAVENOUS ONCE
Status: COMPLETED | OUTPATIENT
Start: 2024-04-11 | End: 2024-04-11

## 2024-04-11 RX ORDER — DOXYCYCLINE 100 MG/10ML
100 INJECTION, POWDER, LYOPHILIZED, FOR SOLUTION INTRAVENOUS EVERY 12 HOURS
Status: DISCONTINUED | OUTPATIENT
Start: 2024-04-11 | End: 2024-04-14

## 2024-04-11 RX ORDER — ACETAMINOPHEN 325 MG/1
975 TABLET ORAL EVERY 8 HOURS
Status: DISCONTINUED | OUTPATIENT
Start: 2024-04-11 | End: 2024-04-18 | Stop reason: HOSPADM

## 2024-04-11 RX ORDER — VITAMIN B COMPLEX
25 TABLET ORAL DAILY
Status: DISCONTINUED | OUTPATIENT
Start: 2024-04-11 | End: 2024-04-11

## 2024-04-11 RX ORDER — ALENDRONATE SODIUM 70 MG/1
70 TABLET ORAL WEEKLY
Status: DISCONTINUED | OUTPATIENT
Start: 2024-04-11 | End: 2024-04-11

## 2024-04-11 RX ORDER — ANTIOX #8/OM3/DHA/EPA/LUT/ZEAX 250-2.5 MG
CAPSULE ORAL
Status: DISCONTINUED | OUTPATIENT
Start: 2024-04-12 | End: 2024-04-12

## 2024-04-11 RX ORDER — METOPROLOL SUCCINATE 25 MG/1
25 TABLET, EXTENDED RELEASE ORAL DAILY
Status: DISCONTINUED | OUTPATIENT
Start: 2024-04-12 | End: 2024-04-18 | Stop reason: HOSPADM

## 2024-04-11 RX ORDER — SULFAMETHOXAZOLE/TRIMETHOPRIM 800-160 MG
1 TABLET ORAL ONCE
Status: COMPLETED | OUTPATIENT
Start: 2024-04-11 | End: 2024-04-11

## 2024-04-11 RX ORDER — LIDOCAINE HYDROCHLORIDE 10 MG/ML
10 INJECTION, SOLUTION INFILTRATION; PERINEURAL ONCE
Status: COMPLETED | OUTPATIENT
Start: 2024-04-11 | End: 2024-04-11

## 2024-04-11 RX ORDER — SULFAMETHOXAZOLE/TRIMETHOPRIM 800-160 MG
1 TABLET ORAL 2 TIMES DAILY
Status: CANCELLED | OUTPATIENT
Start: 2024-04-11 | End: 2024-04-16

## 2024-04-11 RX ORDER — METRONIDAZOLE 500 MG/1
500 TABLET ORAL 3 TIMES DAILY
Qty: 42 TABLET | Refills: 0 | Status: ON HOLD | OUTPATIENT
Start: 2024-04-11 | End: 2024-04-18

## 2024-04-11 RX ORDER — METOPROLOL SUCCINATE 25 MG/1
25 TABLET, EXTENDED RELEASE ORAL DAILY
Status: DISCONTINUED | OUTPATIENT
Start: 2024-04-11 | End: 2024-04-11

## 2024-04-11 RX ORDER — MULTIVITAMIN,THERAPEUTIC
1 TABLET ORAL DAILY
Status: DISCONTINUED | OUTPATIENT
Start: 2024-04-12 | End: 2024-04-18 | Stop reason: HOSPADM

## 2024-04-11 RX ORDER — DIGOXIN 125 MCG
125 TABLET ORAL DAILY
Status: DISCONTINUED | OUTPATIENT
Start: 2024-04-11 | End: 2024-04-18 | Stop reason: HOSPADM

## 2024-04-11 RX ORDER — AMOXICILLIN 250 MG
2 CAPSULE ORAL 2 TIMES DAILY PRN
Status: DISCONTINUED | OUTPATIENT
Start: 2024-04-11 | End: 2024-04-18 | Stop reason: HOSPADM

## 2024-04-11 RX ORDER — AMOXICILLIN 250 MG
1 CAPSULE ORAL 2 TIMES DAILY PRN
Status: DISCONTINUED | OUTPATIENT
Start: 2024-04-11 | End: 2024-04-18 | Stop reason: HOSPADM

## 2024-04-11 RX ADMIN — DOXYCYCLINE 100 MG: 100 INJECTION, POWDER, LYOPHILIZED, FOR SOLUTION INTRAVENOUS at 20:34

## 2024-04-11 RX ADMIN — SULFAMETHOXAZOLE AND TRIMETHOPRIM 1 TABLET: 800; 160 TABLET ORAL at 16:46

## 2024-04-11 RX ADMIN — APIXABAN 2.5 MG: 2.5 TABLET, FILM COATED ORAL at 20:43

## 2024-04-11 RX ADMIN — METRONIDAZOLE 500 MG: 5 INJECTION, SOLUTION INTRAVENOUS at 22:56

## 2024-04-11 RX ADMIN — LIDOCAINE HYDROCHLORIDE 10 ML: 10 INJECTION, SOLUTION INFILTRATION; PERINEURAL at 18:17

## 2024-04-11 RX ADMIN — CLINDAMYCIN PHOSPHATE 600 MG: 600 INJECTION, SOLUTION INTRAVENOUS at 16:46

## 2024-04-11 RX ADMIN — ACETAMINOPHEN 975 MG: 325 TABLET, FILM COATED ORAL at 20:34

## 2024-04-11 ASSESSMENT — ACTIVITIES OF DAILY LIVING (ADL)
ADLS_ACUITY_SCORE: 33
ADLS_ACUITY_SCORE: 33
ADLS_ACUITY_SCORE: 35

## 2024-04-11 ASSESSMENT — COLUMBIA-SUICIDE SEVERITY RATING SCALE - C-SSRS
2. HAVE YOU ACTUALLY HAD ANY THOUGHTS OF KILLING YOURSELF IN THE PAST MONTH?: NO
6. HAVE YOU EVER DONE ANYTHING, STARTED TO DO ANYTHING, OR PREPARED TO DO ANYTHING TO END YOUR LIFE?: NO
1. IN THE PAST MONTH, HAVE YOU WISHED YOU WERE DEAD OR WISHED YOU COULD GO TO SLEEP AND NOT WAKE UP?: NO

## 2024-04-11 NOTE — ED TRIAGE NOTES
Patient was seen at a clinic today and was given  TDAP and 2 prescription for antibiotics. Patient referred to the ED to see ORTHO MD.      Triage Assessment (Adult)       Row Name 04/11/24 1448          Triage Assessment    Airway WDL WDL        Respiratory WDL    Respiratory WDL WDL        Skin Circulation/Temperature WDL    Skin Circulation/Temperature WDL --  Left hand swollen, painfull        Cardiac WDL    Cardiac WDL WDL        Peripheral/Neurovascular WDL    Peripheral Neurovascular WDL WDL        Cognitive/Neuro/Behavioral WDL    Cognitive/Neuro/Behavioral WDL WDL

## 2024-04-11 NOTE — TELEPHONE ENCOUNTER
Jenn's cat bit her finger and broke the skin two nights ago. Her son Dre is calling to report that her finger is starting to swell and is painful. Advised Dre that cat bites can get infected very easily and that Jenn should be seen as soon as possible for assessment and antibiotics. He agrees and will bring Jenn in for an appointment this afternoon with Dr. Best.    Cesar Corey - JERI, RN  04/11/24 10:51 AM

## 2024-04-11 NOTE — PATIENT INSTRUCTIONS
Located in: [facility]  Address: [address] [id number]      Located in: [facility]  Address: [address] [place], [place] [address]

## 2024-04-11 NOTE — CONSULTS
.[facility]  ORTHOPAEDIC SURGERY CONSULT - HISTORY AND PHYSICAL    DATE OF CONSULT: 4/11/2024 4:22 PM    REQUESTING PROVIDER: Ildefonso Palacios MD, MD - N Staff.    CC: erythema/swelling of the L wrist    DATE OF INJURY: 4/9/2024    HISTORY OF PRESENT ILLNESS:     89 RHD F with PMHx of Afib on Eliquis and PAD who presents with erythema and swelling along the L wrist and dorsum of hand.    Patient reports cat bite to the dorsum of the hand 2 days ago.  The left hand and wrist progressively became more swollen, painful, and erythematous. She reports tremendous loss of motion compared to the Right hand and wrist. Her Left hand and wrist motions and functionality were similar prior to the incident.    The patient reports that the left hand and wrist functionality is highly limited at this point.  It is influenced her activities of daily living tremendously..    Denies prior occurrence such as this one. Denies baseline level of pain in the Left forearm and wrist.    Denies numbness, tingling, or weakness to the affected extremities.  Denies fevers, chills, nausea, vomiting, diarrhea, constipation, chest pain, shortness of breath.    PAST MEDICAL HISTORY:  Past Medical History:   Diagnosis Date    Cerebrovascular accident (H)     Chronic atrial fibrillation (H)     Hard of hearing      PAST SURGICAL HISTORY:   History reviewed. No pertinent surgical history.    MEDICATIONS:   Prior to Admission medications    Medication Sig Last Dose Taking? Auth Provider Long Term End Date   Alendronate Sodium (FOSAMAX PO) Take 70 mg by mouth once a week   Reported, Patient Yes    calcium-vitamin D (CALTRATE) 600-400 MG-UNIT per tablet Take 2 tablets by mouth daily   Reported, Patient     digoxin (LANOXIN) 125 MCG tablet Take 1 tablet (125 mcg) by mouth daily for 180 days   Gabe Etienne MD Yes 4/1/24   doxycycline hyclate (VIBRAMYCIN) 100 MG capsule Take 1 capsule (100 mg) by mouth 2 times daily for 14 days    Evangelina Best MD  4/25/24   ELIQUIS ANTICOAGULANT 2.5 MG tablet Take 1 Tablet (2.5 mg) by mouth two times a day.*   Reported, Patient     metoprolol succinate ER (TOPROL XL) 25 MG 24 hr tablet Take 25 mg by mouth   Reported, Patient Yes 1/30/24   metroNIDAZOLE (FLAGYL) 500 MG tablet Take 1 tablet (500 mg) by mouth 3 times daily for 14 days   Evangelina Best MD  4/25/24   Multiple Vitamins-Minerals (PRESERVISION AREDS 2) CAPS    Reported, Patient     multivitamin, therapeutic (THERA-VIT) TABS Take 1 tablet by mouth daily   Reported, Patient     mupirocin (BACTROBAN) 2 % external ointment Apply topically 3 times daily   Gabe Etienne MD     traZODone (DESYREL) 50 MG tablet Take 1.5 tabs at around 6-7pm   Gabe Etienne MD Yes    VITAMIN D, CHOLECALCIFEROL, PO Take 1,000 Units by mouth daily   Reported, Patient       ALLERGIES:   Penicillin g    SOCIAL HISTORY:   Social History     Socioeconomic History    Marital status:      Spouse name: Not on file    Number of children: Not on file    Years of education: Not on file    Highest education level: Not on file   Occupational History    Not on file   Tobacco Use    Smoking status: Former     Types: Cigarettes    Smokeless tobacco: Never   Substance and Sexual Activity    Alcohol use: Yes     Alcohol/week: 1.0 standard drink of alcohol     Types: 1 Standard drinks or equivalent per week    Drug use: Not on file    Sexual activity: Not on file   Other Topics Concern    Not on file   Social History Narrative     twice. Now, has a new boyfriend who is 13 years younger.     Has 4 children.     Lives in [facility] with a grandson. Has an active social life.      Social Determinants of Health     Financial Resource Strain: Not on File (4/6/2022)    Received from [facility]     Financial Resource Strain     Financial Resource Strain: 0   Food Insecurity: Not on File (4/6/2022)    Received from [facility]     Food Insecurity     Food: 0  "  Transportation Needs: Not on File (2022)    Received from [facility]     Transportation Needs     Transportation: 0   Physical Activity: Not on File (2022)    Received from [facility]     Physical Activity     Physical Activity: 0   Stress: Not on File (2022)    Received from [facility]     Stress     Stress: 0   Social Connections: Not on File (2022)    Received from [facility]     Social Connections     Social Connections and Isolation: 0   Interpersonal Safety: Not on file   Housing Stability: Not on File (2022)    Received from [facility]     Housing Stability     [place]ous[place]     Chart review  Tobacco Use    Smoking status: Former Smoker    Smokeless tobacco: Never Used   Substance Use Topics    Alcohol use: Yes    Drug use: Not on file     FAMILY HISTORY:  History reviewed. No pertinent family history.    Patient denies known family history of bleeding, clotting, or anesthesia related complications.     REVIEW OF SYSTEMS:   Pertinent outlined in the HPI.    PHYSICAL EXAM:   Vitals:    24 1447   BP: 104/70   Pulse: 85   Resp: 16   Temp: 98.6  F (37  C)   TempSrc: Oral   SpO2: 92%   Weight: 66.2 kg (146 lb)   Height: 1.6 m (5' 3\")     General: Awake, alert, appropriate, following commands, NAD.  Lungs: Breathing comfortably and nonlabored, no wheezes or stridor noted.  Heart/Cardiovascular: Regular pulse, no peripheral cyanosis.    Left Upper Extremity:     Able to appreciate erythema overlying the dorsal aspect of wrist and dorsum hand. Able to visualize scab along dorsal hand. Able to palpate fluctuance overlying dorsum of hand. Unable to appreciate erythema along palmar aspect of hand. Tender with wrist flexion and extension (dorsal tenderness). Tender with digit flexion and extension (dorsal tenderness). Non-tender to palpation along flexor tendons. Some tenderness to palpation along extensor tendons. Non-tender to palpation along palmar aspect of wrist joint. Tender " "to palpation across diffuse erythema of the Left wrist. Able to slightly flex digits, however cannot make complete fist. Can slightly flex at wrist joint, however, limited compared to opposite side. SILT ax/m/r/u nerve distributions. Warm and well perfused hand.           LABS:  Hemoglobin   Date Value Ref Range Status   04/11/2024 15.3 11.7 - 15.7 g/dL Final     WBC Count   Date Value Ref Range Status   04/11/2024 16.4 (H) 4.0 - 11.0 10e3/uL Final     Platelet Count   Date Value Ref Range Status   04/11/2024 262 150 - 450 10e3/uL Final     No results found for: \"INR\"  Creatinine   Date Value Ref Range Status   04/11/2024 0.83 0.51 - 0.95 mg/dL Final     Glucose   Date Value Ref Range Status   04/11/2024 117 (H) 70 - 99 mg/dL Final     No results found for: \"CRP\"  No results found for: \"SED\"    IMAGING:  Personally reviewed x-ray imaging of the left hand.  Unable to appreciate any acute bony or osseous abnormality at this time.  Possible increase in size of soft tissue over the dorsal aspect of the hand.    PROCEDURE NOTE: Bedside irrigation and debridement of Left dorsal hand  After informed verbal consent and identification of the appropriate anatomical site, the skin of the Left dorsal hand overlying fluctuance palpated on exam was properly sterilized.. 8 cc of 1% lidocaine without epinephrine was injected into the soft tissue pathway of choice to induce analgesia. A 3 cm incision was made on dorsal aspect of hand. Tenotomy scissors were used to spread and expand the infectious cavity. Copious irrigation then occurred with betadine infused saline (approximately 1 liter). Copious irrigation then occurred with normal saline (approximately 1 liter).  The wound was then packed. 4-0 nylon sutures were placed to loosely approximate wound. Gauze and ace wrap were used to dress the wound. Patient tolerated this without complication and remained stable in the immediate post-procedure period.     IMPRESSION: "     89-year-old female 2 days after a cat bite to the left dorsal aspect of the hand who appears to have an exacerbated infectious response consistent with cellulitis.    The patient's exam revealed erythema and edema overlying the dorsal aspect of the hand and extending slightly proximally towards the wrist.  The patient show discomfort when palpating and probing the dorsal aspect of the wrist and hand.  The patient had discomfort with motion of the digits that was localized to the dorsal aspect.  The patient also had palpable fluctuance over the dorsal aspect of the hand. These exam findings are consistent with a cellulitic presentation.    More serious infectious presentations were excluded as a part of our medical decision making.  Flexor tenosynovitis was excluded due to a lack of Knievel signs on exam.  The patient did not exhibit tenderness to palpation across the flexor tendons, did not have an erythematous and edematous presentation of a single digit or digits, allowed for passive extension and flexion, and did not present with a single digit that is with increased flexion compared to the other digits.    A septic wrist joint was excluded based on physical exam findings.  The patient did not demonstrate tenderness along palpation of the volar wrist joint diffusely.  With a septic wrist joint, we would expect diffuse tenderness at both the volar and dorsal aspects of the joint line, in addition to the radial and ulnar aspects of the joint line.  Furthermore, the patient allowed for some passive range of motion of the wrist joint.    At this time, our recommendations are to provide the patient with antibiotics to treat the soft tissue infection described above.  Our team performed a bedside irrigation and debridement overlying the fluctuance appreciated on exam over the dorsal surface of the hand.  The wound was packed and dressed appropriately.  Our team will reevaluate the wound tomorrow and remove the  packing from the wound at that time.    RECOMMENDATIONS:   - Admit to internal medicine for antibiotic administration and infectious monitoring.  - Plan for OR: Not at this time  - Anticoagulation/DVT Prophylaxis: Per primary team  - Antibiotics/Tetanus: Per primary team, recommend broad coverage including Pasteurella species which are common in cat bite wounds  - X-rays/Imaging: No further imaging recommended  - Activity: Up ad justus.  - Weight bearing: Weightbearing as tolerated left upper extremity  - Pain control: Per primary  - Diet: No restrictions or orthopedics  - Follow-up: To be determined  - Disposition: To be determined    Assessment and Plan discussed with Dr. Mandujano, Patient directly staffed with Dr. May (PGY-4).    Chris Dumont MD  Resident Physician PGY-1  Orthopedic Surgery

## 2024-04-11 NOTE — LETTER
Transition Communication Hand-off for Care Transitions to Next Level of Care Provider    Name: Hailey Alexis  : 2/10/1930  MRN #: [id number]  Primary Care Provider: Gabe Etienne     Primary Clinic: [address], [address]  [facility] [address]     Reason for Hospitalization:  Cellulitis of left upper extremity [L03.114]  Cat bite, initial encounter [W55.01XA]  Admit Date/Time: 2024  3:14 PM  Discharge Date: ***  Payor Source: Payor: [employer] / Plan: [employer] / Product Type: HMO /     Readmission Assessment Measure (RUTH) Risk Score/category: ***    Plan of Care Goals/Milestone Events:   Patient Concerns: ***   Patient Goals:   Short-term ***   Long-term ***   Medical Goals   Short-term ***   Long-term ***         Reason for Communication Hand-off Referral: {CCREASONCMCTNHANDOFFREFERRALCTS:66718}    Discharge Plan:       Concern for non-adherence with plan of care:   Y/N ***  Discharge Needs Assessment:  Needs      Flowsheet Row Most Recent Value   Anticipated Changes Related to Illness none   Equipment Currently Used at Home walker, rolling, shower chair            Already enrolled in Tele-monitoring program and name of program:  ***  Follow-up specialty is recommended: {YES / NO:92268}    Follow-up plan:    Future Appointments   Date Time Provider Department Center   2024  1:00 PM Neela Hampton OTR UROT [place]   2024  4:00 PM Cami Lemus MD [facility]   2024  9:00 AM Lizet Fields PA-C [facility]       Any outstanding tests or procedures:        Referrals       Future Labs/Procedures    Occupational Therapy  Referral     Process Instructions:    If ordering DME please utilize the DME ordering process. If you are ordering services for pediatric feeding, please utilize order: Speech Therapy  Referral [[id number]]    Comments:    Please be aware that coverage of these services is subject to the terms and limitations of  your health insurance plan.  Call member services at your health plan with any benefit or coverage questions.  [facility] will call you to coordinate your care as prescribed by your provider. If you don't hear from a representative within 2 business days, please call [phone].              Key Recommendations:      Trisha Dacosta RN    AVS/Discharge Summary is the source of truth; this is a helpful guide for improved communication of patient story

## 2024-04-11 NOTE — PROGRESS NOTES
CC: Cat Bite (Happened 2 days ago on top of left hand.)      SUBJECTIVE: Jenn is a 89 year old female who comes in with family, including her son who acts as an independent historian, for:    Left hand cat bite. Her cat bit her on the dorsum of the hand 2 days ago. Son saw it 2 nights ago and it was a little red, then saw it last night and it was much more swollen and red. Called clinic and made an appt. Jenn has pain in the hand and is unable to flex and extend her fingers of the right hand due to pain. Area is red, swollen, warm to touch and swelling is now extending up the forearm. Denies fevers or chills.   Her last tetanus shot was in 7/2015.    She has an allergy to PCN, listed as anaphylaxis.       OBJECTIVE:   /69   Pulse 107   Temp 97  F (36.1  C)   SpO2 94%   General: Alert and oriented in no acute distress.  Skin: The dorsum of the left hand is swollen, erythematous, warm to touch, and tender to palpation. Scab seen in the mid hand from cat bite, no drainage. Redness, warmth, and swelling extend up the left arm as well. Jenn is not able to flex and extend the fingers of her left hand without pain.      ASSESSMENT/PLAN:   Jenn was seen today for cat bite. The area appears to be infected and I'm concerned about a deeper infection. I think being seen and evaluated in the ED would be the best option. She may need IV antibiotics, advanced imaging, and urgent hand referral. We updated her tetanus vaccine today as well and I did go ahead and put in an ortho/hand referral and oral antibiotics for her to  in case she isn't admitted through the ED, but I advised that ultimately they should take the recommendation of the ED doctors on further follow-up plan. They will go to [facility] ED from here.     Cat bite, initial encounter  -     doxycycline hyclate (VIBRAMYCIN) 100 MG capsule; Take 1 capsule (100 mg) by mouth 2 times daily for 14 days  -     metroNIDAZOLE (FLAGYL) 500 MG tablet; Take 1 tablet (500 mg)  by mouth 3 times daily for 14 days  -     TDAP VACCINE (Adacel, Boostrix)  [[id number]]  -     Orthopedic  Referral; Future      Evangelina Best MD  Family Medicine

## 2024-04-11 NOTE — H&P
[facility]    History and Physical - Hospitalist Service, GOLD TEAM        Date of Admission:  4/11/2024    Assessment & Plan      Hailey Alexis is a 89F w/ PMH history of right PICA and small scattered right PCA territory ischemic strokes (6/2020), atrial flutter, osteoporosis, macular degeneration admitted observation status on 4/11/2024 for a cat bite to the left hand.     Cat bite to left hand (~4/8/24)  Patient was initially prescribed doxycycline and metronidazole for this outpatient but she was not able to  prior to coming in.  No associated numbness or sensorimotor concerns.  -Images taken per Ortho available in media and per their note  -Appreciate orthopedics input, performed I&D   -Admit for antibiotics administration   -Follow-up to be determined  -Pain management: As needed Tylenol and oxycodone 2.5 mg every 4 hours as needed  -ABX: Clindamycin and Bactrim started in ED --> transition to doxycycline and metronidazole  -Tdap given in [facility] 4/11/24    Hx right PICA and small scattered right PCA territory ischemic strokes (6/2020)   MRI brain showed an acute right PICA and small scattered right PCA territory ischemic strokes.  Case was discussed with [facility] neurology. Given NIH stroke scale of 0 at the time of arrival to emergency department and timeframe near 4.5 hours at the time of decision making, decision was made not to treat with tPA or neuro interventional radiology.  No residual symptoms  -Continue DOAC    Atrial flutter  History of borderline right atrial pressure  Severely enlarged left atrium  Severe tricuspid regurgitation  Prior stroke was felt to be cardioembolic.. TTE 6/2020 with above findings.  Follows with cardiology at [facility] and was last assessed per nursing 9/2023.  -Continue PTA Eliquis  -Continue PTA metoprolol with hold parameters  -hold digoxin- last fill 10/4/23 for 90 days, unclear if taking  - digoxin level  "    Osteoporosis-continue PTA Fosamax on discharge  HLD-continue statin        Diet:  regular  DVT Prophylaxis: DOAC  Snyder Catheter: Not present  Lines: None     Cardiac Monitoring: None  Code Status:  DNR/DNI    Clinically Significant Risk Factors Present on Admission               # Drug Induced Coagulation Defect: home medication list includes an anticoagulant medication         # Overweight: Estimated body mass index is 25.86 kg/m  as calculated from the following:    Height as of this encounter: 1.6 m (5' 3\").    Weight as of this encounter: 66.2 kg (146 lb).              Disposition Plan     Medically Ready for Discharge: Anticipated Tomorrow         The patient's care was discussed with the Attending Physician, Dr. Sampson, Patient, and Patient's Family.    Rae Perales PA-C  Hospitalist Service, [facility]  Securely message with [employer] (more info)  Text page via [facility] Paging/Directory   See signed in provider for up to date coverage information    ______________________________________________________________________    Chief Complaint   Cat bite to left hand    History is obtained from the patient, patient's chart, patient's son    History of Present Illness   Hailey Alexis is a 89F w/ PMH history of right PICA and small scattered right PCA territory ischemic strokes (6/2020), atrial flutter, osteoporosis, macular degeneration admitted observation status on 4/11/2024 for a cat bite to the left hand.     Patient is seen in the company of her son Dre in the emergency room.    Patient notes having a 5/10 pain at the site of the Bite which is on the dorsal side of her left hand.  She denies having any other injuries.  She does not endorse any other changes in sensation.  She notes difficulty closing her fingers due to pain and endorses swelling of the hand.  She has not noticed any systemic signs of infection including any fevers or " chills.    She has been tolerating a regular diet today without any nausea or vomiting.  She does not endorse any problems with bowels or abdominal pain.  She does not endorse having any urinary issues.  She does not endorse having any incontinence.  Patient denies chest pain, shortness of breath, palpitations, dizziness or lightheadedness.      Past Medical History    Past Medical History:   Diagnosis Date    Cerebrovascular accident (H)     Chronic atrial fibrillation (H)     Hard of hearing        Past Surgical History   History reviewed. No pertinent surgical history.    Prior to Admission Medications   Prior to Admission Medications   Prescriptions Last Dose Informant Patient Reported? Taking?   Alendronate Sodium (FOSAMAX PO)   Yes No   Sig: Take 70 mg by mouth once a week   ELIQUIS ANTICOAGULANT 2.5 MG tablet   Yes No   Sig: Take 1 Tablet (2.5 mg) by mouth two times a day.*   Multiple Vitamins-Minerals (PRESERVISION AREDS 2) CAPS   Yes No   VITAMIN D, CHOLECALCIFEROL, PO   Yes No   Sig: Take 1,000 Units by mouth daily   calcium-vitamin D (CALTRATE) 600-400 MG-UNIT per tablet   Yes No   Sig: Take 2 tablets by mouth daily   digoxin (LANOXIN) 125 MCG tablet   No No   Sig: Take 1 tablet (125 mcg) by mouth daily for 180 days   doxycycline hyclate (VIBRAMYCIN) 100 MG capsule   No No   Sig: Take 1 capsule (100 mg) by mouth 2 times daily for 14 days   metoprolol succinate ER (TOPROL XL) 25 MG 24 hr tablet   Yes No   Sig: Take 25 mg by mouth   metroNIDAZOLE (FLAGYL) 500 MG tablet   No No   Sig: Take 1 tablet (500 mg) by mouth 3 times daily for 14 days   multivitamin, therapeutic (THERA-VIT) TABS   Yes No   Sig: Take 1 tablet by mouth daily   mupirocin (BACTROBAN) 2 % external ointment   No No   Sig: Apply topically 3 times daily   traZODone (DESYREL) 50 MG tablet   No No   Sig: Take 1.5 tabs at around 6-7pm      Facility-Administered Medications: None        Review of Systems    The 10 point Review of Systems is  negative other than noted in the HPI or here.     Social History   I have reviewed this patient's social history and updated it with pertinent information if needed.  Social History     Tobacco Use    Smoking status: Former     Types: Cigarettes    Smokeless tobacco: Never   Substance Use Topics    Alcohol use: Yes     Alcohol/week: 1.0 standard drink of alcohol     Types: 1 Standard drinks or equivalent per week         Allergies   Allergies   Allergen Reactions    Penicillin G Anaphylaxis     Red and inflamed    Red and inflamed   Red and inflamed   Red and inflamed   Red and inflamed        Physical Exam   Vital Signs: Temp: 98.6  F (37  C) Temp src: Oral BP: 104/70 Pulse: 85   Resp: 16 SpO2: 92 % O2 Device: None (Room air)    Weight: 146 lbs 0 oz  GENERAL: Alert and oriented x 3. NAD.  Able to sit upright unassisted. Cooperative.   HEENT: Anicteric sclera.  NC. AT.  Pupils equal and round  CV: No murmurs.  Irregularly irregular rate and rhythm  RESPIRATORY: Effort normal on RA. Lungs CTAB with no wheezing, rales, rhonchi.   GI: Abdomen soft, NT, NABS  NEUROLOGICAL: No focal deficits. Moves all extremities.    EXTREMITIES: + Blanchable erythema and edema of all 5 digits in visible areas of the distal hand circumferentially, Ace wraps in place extending to the distal palmar crease that are CDI.  No other peripheral edema. Intact bilateral pedal pulses.   SKIN: No jaundice. No other rashes.      Medical Decision Making       65 MINUTES SPENT BY ME on the date of service doing chart review, history, exam, documentation & further activities per the note.      Data     I have personally reviewed the following data over the past 24 hrs:    16.4 (H)  \   15.3   / 262     139 102 21.9 /  117 (H)   4.5 27 0.83 \     ALT: 17 AST: 30 AP: 141 TBILI: 0.8   ALB: 4.0 TOT PROTEIN: 7.6 LIPASE: N/A

## 2024-04-11 NOTE — ED PROVIDER NOTES
ED Provider Note  [facility]      History     Chief Complaint   Patient presents with    Cat Bite     Patients cat bit her on the Left hand 2-3 days ago. Left hand started to swell up yesterday and now its reddened.      HPI  89yo RHD F pmhx CVA and A-fib p/w pain and swelling of left hand s/p being bitten by her own cat 2 days ago.  Cat UTD on vaccinations.  Over the interval patient has had increased pain and swelling, as well as erythema.  She has decreased ROM of the fingers and her affected hand, with associated pain.  No paresthesias.  No fevers.  She was seen in clinic earlier today, where they updated her tetanus, but referred her to the ED for further evaluation and treatment.    Past Medical History  Past Medical History:   Diagnosis Date    Cerebrovascular accident (H)     Chronic atrial fibrillation (H)     Hard of hearing      History reviewed. No pertinent surgical history.  Alendronate Sodium (FOSAMAX PO)  calcium-vitamin D (CALTRATE) 600-400 MG-UNIT per tablet  digoxin (LANOXIN) 125 MCG tablet  doxycycline hyclate (VIBRAMYCIN) 100 MG capsule  ELIQUIS ANTICOAGULANT 2.5 MG tablet  metoprolol succinate ER (TOPROL XL) 25 MG 24 hr tablet  metroNIDAZOLE (FLAGYL) 500 MG tablet  Multiple Vitamins-Minerals (PRESERVISION AREDS 2) CAPS  multivitamin, therapeutic (THERA-VIT) TABS  mupirocin (BACTROBAN) 2 % external ointment  traZODone (DESYREL) 50 MG tablet  VITAMIN D, CHOLECALCIFEROL, PO      Allergies   Allergen Reactions    Penicillin G Anaphylaxis     Red and inflamed    Red and inflamed   Red and inflamed   Red and inflamed   Red and inflamed     Family History  History reviewed. No pertinent family history.  Social History   Social History     Tobacco Use    Smoking status: Former     Types: Cigarettes    Smokeless tobacco: Never   Substance Use Topics    Alcohol use: Yes     Alcohol/week: 1.0 standard drink of alcohol     Types: 1 Standard drinks or equivalent per week      "    A medically appropriate review of systems was performed with pertinent positives and negatives noted in the HPI, and all other systems negative.    Physical Exam   BP: 104/70  Pulse: 85  Temp: 98.6  F (37  C)  Resp: 16  Height: 160 cm (5' 3\")  Weight: 66.2 kg (146 lb)  SpO2: 92 %  Physical Exam  Constitutional:       General: She is not in acute distress.     Appearance: Normal appearance. She is well-developed.   HENT:      Head: Normocephalic and atraumatic.   Eyes:      Conjunctiva/sclera: Conjunctivae normal.   Cardiovascular:      Rate and Rhythm: Normal rate.   Pulmonary:      Effort: Pulmonary effort is normal.   Abdominal:      General: Abdomen is flat.   Musculoskeletal:        Hands:       Cervical back: Normal range of motion and neck supple.      Comments: Dorsum of left hand grossly swollen and erythematous, warm to touch.  Decreased range of motion in fingers.  Pain with passive ROM of the third and fourth digit.  Cap refill and sensation intact.   Skin:     General: Skin is warm and dry.      Findings: No rash.   Neurological:      Mental Status: She is alert and oriented to person, place, and time.           ED Course, Procedures, & Data      Procedures            EKG Interpretation:                Results for orders placed or performed during the hospital encounter of 04/11/24   XR Hand Left G/E 3 Views     Status: None    Narrative    EXAM: XR HAND LEFT G/E 3 VIEWS  LOCATION: [facility]  DATE: 4/11/2024    INDICATION: Cat bite, r o retained tooth  COMPARISON: None.      Impression    IMPRESSION: No definite radiopaque foreign body is identified. Soft tissue swelling dorsally. No acute fracture or malalignment. Severe STT and fourth DIP joint degenerative changes. Otherwise mild to moderate degenerative changes throughout the hand and   wrist. Osteopenia. Positive ulnar variance.   Comprehensive metabolic panel     Status: Abnormal   Result Value Ref " Range    Sodium 139 135 - 145 mmol/L    Potassium 4.5 3.4 - 5.3 mmol/L    Carbon Dioxide (CO2) 27 22 - 29 mmol/L    Anion Gap 10 7 - 15 mmol/L    Urea Nitrogen 21.9 8.0 - 23.0 mg/dL    Creatinine 0.83 0.51 - 0.95 mg/dL    GFR Estimate 65 >60 mL/min/1.73m2    Calcium 9.3 8.2 - 9.6 mg/dL    Chloride 102 98 - 107 mmol/L    Glucose 117 (H) 70 - 99 mg/dL    Alkaline Phosphatase 141 40 - 150 U/L    AST 30 0 - 45 U/L    ALT 17 0 - 50 U/L    Protein Total 7.6 6.4 - 8.3 g/dL    Albumin 4.0 3.5 - 5.2 g/dL    Bilirubin Total 0.8 <=1.2 mg/dL   [place] Draw     Status: None    Narrative    The following orders were created for panel order [place] Draw.  Procedure                               Abnormality         Status                     ---------                               -----------         ------                     Extra Blue Top Tube[[id number]]                              Final result               Extra Red Top Tube[[id number]]                               Final result               Extra Purple Top Tube[[id number]]                            Final result                 Please view results for these tests on the individual orders.   CBC with platelets and differential     Status: Abnormal   Result Value Ref Range    WBC Count 16.4 (H) 4.0 - 11.0 10e3/uL    RBC Count 5.01 3.80 - 5.20 10e6/uL    Hemoglobin 15.3 11.7 - 15.7 g/dL    Hematocrit 46.8 35.0 - 47.0 %    MCV 93 78 - 100 fL    MCH 30.5 26.5 - 33.0 pg    MCHC 32.7 31.5 - 36.5 g/dL    RDW 14.2 10.0 - 15.0 %    Platelet Count 262 150 - 450 10e3/uL    % Neutrophils 78 %    % Lymphocytes 11 %    % Monocytes 10 %    % Eosinophils 1 %    % Basophils 0 %    % Immature Granulocytes 0 %    NRBCs per 100 WBC 0 <1 /100    Absolute Neutrophils 12.7 (H) 1.6 - 8.3 10e3/uL    Absolute Lymphocytes 1.8 0.8 - 5.3 10e3/uL    Absolute Monocytes 1.6 (H) 0.0 - 1.3 10e3/uL    Absolute Eosinophils 0.2 0.0 - 0.7 10e3/uL    Absolute Basophils 0.1 0.0 - 0.2 10e3/uL    Absolute Immature  Granulocytes 0.1 <=0.4 10e3/uL    Absolute NRBCs 0.0 10e3/uL   Extra Blue Top Tube     Status: None   Result Value Ref Range    Hold Specimen JIC    Extra Red Top Tube     Status: None   Result Value Ref Range    Hold Specimen JIC    Extra Purple Top Tube     Status: None   Result Value Ref Range    Hold Specimen     CBC with platelets differential     Status: Abnormal    Narrative    The following orders were created for panel order CBC with platelets differential.  Procedure                               Abnormality         Status                     ---------                               -----------         ------                     CBC with platelets and d...[[id number]]  Abnormal            Final result                 Please view results for these tests on the individual orders.     Medications   lidocaine 1 % injection 10 mL (has no administration in time range)   clindamycin (CLEOCIN) 600 mg in 50 mL D5W intermittent infusion (600 mg Intravenous $New Bag 4/11/24 1646)   sulfamethoxazole-trimethoprim (BACTRIM DS) 800-160 MG per tablet 1 tablet (1 tablet Oral $Given 4/11/24 1646)     Labs Ordered and Resulted from Time of ED Arrival to Time of ED Departure   COMPREHENSIVE METABOLIC PANEL - Abnormal       Result Value    Sodium 139      Potassium 4.5      Carbon Dioxide (CO2) 27      Anion Gap 10      Urea Nitrogen 21.9      Creatinine 0.83      GFR Estimate 65      Calcium 9.3      Chloride 102      Glucose 117 (*)     Alkaline Phosphatase 141      AST 30      ALT 17      Protein Total 7.6      Albumin 4.0      Bilirubin Total 0.8     CBC WITH PLATELETS AND DIFFERENTIAL - Abnormal    WBC Count 16.4 (*)     RBC Count 5.01      Hemoglobin 15.3      Hematocrit 46.8      MCV 93      MCH 30.5      MCHC 32.7      RDW 14.2      Platelet Count 262      % Neutrophils 78      % Lymphocytes 11      % Monocytes 10      % Eosinophils 1      % Basophils 0      % Immature Granulocytes 0      NRBCs per 100 WBC 0       Absolute Neutrophils 12.7 (*)     Absolute Lymphocytes 1.8      Absolute Monocytes 1.6 (*)     Absolute Eosinophils 0.2      Absolute Basophils 0.1      Absolute Immature Granulocytes 0.1      Absolute NRBCs 0.0     CRP INFLAMMATION     XR Hand Left G/E 3 Views   Final Result   IMPRESSION: No definite radiopaque foreign body is identified. Soft tissue swelling dorsally. No acute fracture or malalignment. Severe STT and fourth DIP joint degenerative changes. Otherwise mild to moderate degenerative changes throughout the hand and    wrist. Osteopenia. Positive ulnar variance.             Critical care was not performed.     Medical Decision Making  The patient's presentation was of high complexity (an acute health issue posing potential threat to life or bodily function).    The patient's evaluation involved:  ordering and/or review of 3+ test(s) in this encounter (see separate area of note for details)  discussion of management or test interpretation with another health professional (orthopedics and hospitalist)    The patient's management necessitated high risk (a decision regarding hospitalization).    Assessment & Plan    89yo RHD F pmhx CVA and A-fib p/w pain and swelling of left hand s/p being bitten by her own cat 2 days ago.  Cat UTD on vaccinations.  Has decreased ROM of the fingers of her affected hand, with associated pain.  No paresthesias.  No fevers.  She was seen in clinic earlier today, where they updated her tetanus, but referred her to the ED for further evaluation and treatment.    In ED patient is HDS/AF.  The dorsum of her left hand is grossly swollen and erythematous as well as warm to the touch, with puncture wound is indicated above.  She has decreased range of motion of the fingers, and pain with passive range of motion of her third and fourth digits.  She is neurovascular intact.    Patient has obvious cellulitis, with concern for tenosynovitis versus tendon injury.  An x-ray was obtained to  rule out retained FB and negative.  She had labs that showed a leukocytosis of 16.4.  CMP unremarkable.  Given patient's documented anaphylactic penicillin allergy she was covered with clindamycin and Bactrim, following discussion with pharmacy.  Orthopedics was consulted.  Bedside I&D for concern of abscess.  Patient admitted to medicine for further antibiotics and cares.    I have reviewed the nursing notes. I have reviewed the findings, diagnosis, plan and need for follow up with the patient.    New Prescriptions    No medications on file       Final diagnoses:   Cat bite, initial encounter   Cellulitis of left upper extremity         William Suarez PA-C  [facility] EMERGENCY DEPARTMENT  4/11/2024     William Suarez PA-C  04/11/24 8144    --    ED Attending Physician Attestation    I Ildefonso Palacios MD, cared for this patient with the Advanced Practice Provider (MIKY). I have performed a history and physical examination of the patient independent of the MIKY. I reviewed the MIKY's documentation above and agree with the documented findings and plan of care. I personally provided a substantive portion of the care for this patient, including the complete Medical Decision Making. Please see the MKIY's documentation for full details.    Summary of HPI, PE, ED Course   Patient is a 89 year old female evaluated in the emergency department for cat bite to hand. Exam and ED course notable for erythematous and edematous hand concerning for infection; x-ray negative for retained teeth and also negative for underlying bone fracture; evaluated by hand surgery consultants. After the completion of care in the emergency department, the patient was admitted to inpatient.    Critical Care & Procedures  Not applicable.        Medical Decision Making  The patient's presentation was of high complexity (an acute health issue posing potential threat to life or bodily function).    The patient's evaluation  involved:  ordering and/or review of 3+ test(s) in this encounter (see separate area of note for details)  discussion of management or test interpretation with another health professional (orthopedics and hospitalist)    The patient's management necessitated high risk (a decision regarding hospitalization).        Ildefonso Palacios MD  Emergency Medicine          Ildefonso Palacios MD  04/17/24 0201

## 2024-04-11 NOTE — NURSING NOTE
Prior to immunization administration, verified patients identity using patient s name and date of birth. Please see Immunization Activity for additional information.     Screening Questionnaire for Adult Immunization    Are you sick today?   No   Do you have allergies to medications, food, a vaccine component or latex?   No   Have you ever had a serious reaction after receiving a vaccination?   No   Do you have a long-term health problem with heart, lung, kidney, or metabolic disease (e.g., diabetes), asthma, a blood disorder, no spleen, complement component deficiency, a cochlear implant, or a spinal fluid leak?  Are you on long-term aspirin therapy?   No   Do you have cancer, leukemia, HIV/AIDS, or any other immune system problem?   No   Do you have a parent, brother, or sister with an immune system problem?   No   In the past 3 months, have you taken medications that affect  your immune system, such as prednisone, other steroids, or anticancer drugs; drugs for the treatment of rheumatoid arthritis, Crohn s disease, or psoriasis; or have you had radiation treatments?   No   Have you had a seizure, or a brain or other nervous system problem?   No   During the past year, have you received a transfusion of blood or blood    products, or been given immune (gamma) globulin or antiviral drug?   No   For women: Are you pregnant or is there a chance you could become       pregnant during the next month?   No   Have you received any vaccinations in the past 4 weeks?   No     Immunization questionnaire answers were all negative.      Patient instructed to remain in clinic for 15 minutes afterwards, and to report any adverse reactions.     Screening performed by Magaly Rodriguez MA on 4/11/2024 at 1:49 PM.

## 2024-04-12 ENCOUNTER — TELEPHONE (OUTPATIENT)
Dept: ORTHOPEDICS | Facility: CLINIC | Age: 89
End: 2024-04-12
Payer: COMMERCIAL

## 2024-04-12 LAB
ANION GAP SERPL CALCULATED.3IONS-SCNC: 7 MMOL/L (ref 7–15)
BUN SERPL-MCNC: 22.8 MG/DL (ref 8–23)
CALCIUM SERPL-MCNC: 8.3 MG/DL (ref 8.2–9.6)
CHLORIDE SERPL-SCNC: 104 MMOL/L (ref 98–107)
CREAT SERPL-MCNC: 0.84 MG/DL (ref 0.51–0.95)
DEPRECATED HCO3 PLAS-SCNC: 26 MMOL/L (ref 22–29)
DIGOXIN SERPL-MCNC: <0.4 NG/ML (ref 0.6–2)
EGFRCR SERPLBLD CKD-EPI 2021: 64 ML/MIN/1.73M2
ERYTHROCYTE [DISTWIDTH] IN BLOOD BY AUTOMATED COUNT: 14.3 % (ref 10–15)
GLUCOSE SERPL-MCNC: 87 MG/DL (ref 70–99)
HCT VFR BLD AUTO: 40.1 % (ref 35–47)
HGB BLD-MCNC: 13.3 G/DL (ref 11.7–15.7)
MCH RBC QN AUTO: 30.9 PG (ref 26.5–33)
MCHC RBC AUTO-ENTMCNC: 33.2 G/DL (ref 31.5–36.5)
MCV RBC AUTO: 93 FL (ref 78–100)
PLATELET # BLD AUTO: 212 10E3/UL (ref 150–450)
POTASSIUM SERPL-SCNC: 4.3 MMOL/L (ref 3.4–5.3)
RBC # BLD AUTO: 4.31 10E6/UL (ref 3.8–5.2)
SODIUM SERPL-SCNC: 137 MMOL/L (ref 135–145)
WBC # BLD AUTO: 14.7 10E3/UL (ref 4–11)

## 2024-04-12 PROCEDURE — G0378 HOSPITAL OBSERVATION PER HR: HCPCS

## 2024-04-12 PROCEDURE — 250N000013 HC RX MED GY IP 250 OP 250 PS 637: Performed by: PHYSICIAN ASSISTANT

## 2024-04-12 PROCEDURE — 250N000013 HC RX MED GY IP 250 OP 250 PS 637: Performed by: INTERNAL MEDICINE

## 2024-04-12 PROCEDURE — 85027 COMPLETE CBC AUTOMATED: CPT | Performed by: PHYSICIAN ASSISTANT

## 2024-04-12 PROCEDURE — 96366 THER/PROPH/DIAG IV INF ADDON: CPT

## 2024-04-12 PROCEDURE — 99233 SBSQ HOSP IP/OBS HIGH 50: CPT | Performed by: STUDENT IN AN ORGANIZED HEALTH CARE EDUCATION/TRAINING PROGRAM

## 2024-04-12 PROCEDURE — 250N000011 HC RX IP 250 OP 636: Performed by: PHYSICIAN ASSISTANT

## 2024-04-12 PROCEDURE — 80048 BASIC METABOLIC PNL TOTAL CA: CPT | Performed by: PHYSICIAN ASSISTANT

## 2024-04-12 PROCEDURE — 96376 TX/PRO/DX INJ SAME DRUG ADON: CPT

## 2024-04-12 PROCEDURE — 36415 COLL VENOUS BLD VENIPUNCTURE: CPT | Performed by: PHYSICIAN ASSISTANT

## 2024-04-12 RX ORDER — VIT C/E/ZN/COPPR/LUTEIN/ZEAXAN 60 MG-6 MG
1 CAPSULE ORAL DAILY
Status: DISCONTINUED | OUTPATIENT
Start: 2024-04-13 | End: 2024-04-18 | Stop reason: HOSPADM

## 2024-04-12 RX ADMIN — APIXABAN 2.5 MG: 2.5 TABLET, FILM COATED ORAL at 09:33

## 2024-04-12 RX ADMIN — ACETAMINOPHEN 975 MG: 325 TABLET, FILM COATED ORAL at 11:05

## 2024-04-12 RX ADMIN — ACETAMINOPHEN 975 MG: 325 TABLET, FILM COATED ORAL at 05:05

## 2024-04-12 RX ADMIN — METRONIDAZOLE 500 MG: 5 INJECTION, SOLUTION INTRAVENOUS at 05:05

## 2024-04-12 RX ADMIN — ACETAMINOPHEN 975 MG: 325 TABLET, FILM COATED ORAL at 19:39

## 2024-04-12 RX ADMIN — METRONIDAZOLE 500 MG: 5 INJECTION, SOLUTION INTRAVENOUS at 14:03

## 2024-04-12 RX ADMIN — DOXYCYCLINE 100 MG: 100 INJECTION, POWDER, LYOPHILIZED, FOR SOLUTION INTRAVENOUS at 19:38

## 2024-04-12 RX ADMIN — DOXYCYCLINE 100 MG: 100 INJECTION, POWDER, LYOPHILIZED, FOR SOLUTION INTRAVENOUS at 09:33

## 2024-04-12 RX ADMIN — METRONIDAZOLE 500 MG: 5 INJECTION, SOLUTION INTRAVENOUS at 20:42

## 2024-04-12 RX ADMIN — APIXABAN 2.5 MG: 2.5 TABLET, FILM COATED ORAL at 19:40

## 2024-04-12 RX ADMIN — Medication 25 MCG: at 09:33

## 2024-04-12 RX ADMIN — Medication 2.5 MG: at 10:02

## 2024-04-12 RX ADMIN — THERA TABS 1 TABLET: TAB at 09:33

## 2024-04-12 RX ADMIN — METOPROLOL SUCCINATE 25 MG: 25 TABLET, EXTENDED RELEASE ORAL at 09:33

## 2024-04-12 ASSESSMENT — ACTIVITIES OF DAILY LIVING (ADL)
ADLS_ACUITY_SCORE: 42
ADLS_ACUITY_SCORE: 33
ADLS_ACUITY_SCORE: 42
ADLS_ACUITY_SCORE: 33
ADLS_ACUITY_SCORE: 33
ADLS_ACUITY_SCORE: 42
ADLS_ACUITY_SCORE: 33
ADLS_ACUITY_SCORE: 42
ADLS_ACUITY_SCORE: 33
ADLS_ACUITY_SCORE: 42
ADLS_ACUITY_SCORE: 33
ADLS_ACUITY_SCORE: 42

## 2024-04-12 NOTE — PROGRESS NOTES
6MS ADMISSION    D: Patient admitted/transferred from ED via bed for Cellulitis.     I: Upon arrival to the unit patient was oriented to room, unit, and call light. Patient s height, weight, and vital signs were obtained. Allergies reviewed and allergy band applied. Provider notified of patient s arrival on the unit. Adult AVS completed. Head to toe assessment completed. Education assessment completed. Care plan initiated.    A: Vital signs stable upon admission. Patient rates pain at 3/10. Two RN skin assessment completed Yes. Second RN was Lizet. Significant Skin Findings include Cellulitis of L hand. [facility] Nurse Consult Ordered? No. Bed Algorithm can be found in PCS flow sheets (Support Surface Algorithm) and on IP [facility] NURSE RESOURCE TAB, was this used during this assessment?  Yes. Was a pulsate mattress ordered ** No.    P: Continue to monitor patient s condition  and intervene as needed. Continue with plan of care. Notify provider with any concerns or changes in patient status.

## 2024-04-12 NOTE — TELEPHONE ENCOUNTER
DIAGNOSIS: Left hand -- spreading redness, warmth, limited ROM of hand.  Cat bite, initial encounter [W55.01XA]  - Primary   APPOINTMENT DATE: 04/15/2024   NOTES STATUS DETAILS   OFFICE NOTE from referring provider Internal 04/11/2024 - Evangelina Best MD - [facility]   DISCHARGE SUMMARY from hospital Internal 04/11/2024 -   [facility]     PHOTOGRAPH MEDIA TAB    XRAYS (IMAGES & REPORTS) PACS INTERNAL:  04/11/2024 - LT HAND

## 2024-04-12 NOTE — PLAN OF CARE
"Goal Outcome Evaluation:    VS: /69 (BP Location: Right arm)   Pulse 84   Temp 97.6  F (36.4  C) (Oral)   Resp 20   Ht 1.6 m (5' 3\")   Wt 66.2 kg (146 lb)   SpO2 94%   BMI 25.86 kg/m     O2: Stable on room air, denies chest pain and shortness of breath.    Output: Voids spontaneously without difficulty   Activity: SBA with walker    Up for meals? Yes   Skin: Redness and swelling to L hand   Pain: Managed with oxycodone and scheduled tylenol    CMS: Intact, denies numbness and tingling, A/Ox4   Dressing: N/a    Diet: Regular    LDA: R PIV- SL   Equipment: IV pump and pole, personal belongings, call light within reach    Plan: TBD, continue POC.         Plan of Care Reviewed With: patient    Overall Patient Progress: improvingOverall Patient Progress: improving           "

## 2024-04-12 NOTE — PHARMACY
"The following home medications were NOT continued on inpatient admission per \"Discontinuation of nonessential home medications during hospitalization\" policy: alendronate    If a therapeutic holiday is deemed inappropriate per the prescriber, please notify the pharmacist regarding the medication order.    The pharmacist is available to answer any questions and/or concerns the patient may have regarding discontinuation of non-essential medications.    Please ensure that these medications are restarted as needed upon discharge via the medication reconciliation discharge process and included on the discharge medication reconciliation report.    Thank you,  Patrick Pickard, [facility]   "

## 2024-04-12 NOTE — PROGRESS NOTES
.Orthopaedic Surgery Progress Note 04/12/2024    Subjective    No acute events overnight.  Pain well controlled. Denies new numbness, tingling, or weakness. Denies chest pain, SOB, chills, fevers.    Objective  Temp: 98.7  F (37.1  C) Temp src: Oral BP: (!) 144/82 Pulse: 102   Resp: 20 SpO2: 95 % O2 Device: None (Room air)      Exam:  Gen: No acute distress, resting comfortably in bed.  Resp: Non-labored breathing  Cardio: Regular rate via peripheral pulse    MSK:    LUE:  - Dressings c/d/I - removed to analyze wound, replaced with clean dressings (adaptic, gauze, loose coban)  - Significantly reduced swelling and erythema  - Improved ROM of the wrist joint upon flexion and extension  - Improved ROM of the digits upon flexion and extension at the MCP, PIP, DIP joints  - Fires EPL, FPL, Intrinsics  - SILT medial/radial/ulnar/axillary nerves  - Radial pulse 2+, hand wwp    Recent Labs   Lab 04/11/24  1551   WBC 16.4*   HGB 15.3          Assessment:     89-year-old female 2 days after a cat bite to the left dorsal aspect of the hand who appears to have an exacerbated infectious response consistent with cellulitis. Patient continues to improve.    Primary team to continue to trend inflammatory markers. Ortho will round on patient tomorrow as well to ensure the hand continues to improve. Primary team to decide on antibiotic plan and duration.    Plan:  - Admit to internal medicine for antibiotic administration and infectious monitoring.  - Plan for OR: Not at this time  - Anticoagulation/DVT Prophylaxis: Per primary team  - Antibiotics/Tetanus: Per primary team, recommend broad coverage including Pasteurella species which are common in cat bite wounds  - X-rays/Imaging: No further imaging recommended  - Activity: Up ad justus.  - Weight bearing: Weightbearing as tolerated left upper extremity  - Pain control: Per primary  - Diet: No restrictions or orthopedics  - Follow-up: To be determined  - Disposition: To be  determined    Chris Dumont MD  Resident Physician PGY-1  Orthopaedic Surgery  [phone]    Please page me with any questions/concerns. If there is no response, if it is a weekend, or if it is during evening hours then please page the orthopaedic surgery resident on call.     No future appointments.

## 2024-04-12 NOTE — TELEPHONE ENCOUNTER
ATC moved patient's ortho appointment to next Monday morning with Lizet Fields at 8:30am during Dr. Zavala's clinic.  Called son, Agus, to notify of the appointment change and confirmed clinic location with him.    Patrick Nguyen, ATC

## 2024-04-12 NOTE — PROGRESS NOTES
[facility]    Medicine Progress Note - Hospitalist Service, GOLD TEAM 22    Date of Admission:  4/11/2024    Assessment & Plan   Hailey Alexis is a 89F w/ PMH history of right PICA and small scattered right PCA territory ischemic strokes (6/2020), atrial flutter, osteoporosis, macular degeneration admitted observation status on 4/11/2024 for a cat bite to the left hand.      Today's Plan:  - continue IV abx. Once redness and swelling improves, will switch to po abx in preparation for discharge  - monitor fever curve. Monitor WBC    Cat bite to left hand (~4/8/24)  Patient was initially prescribed doxycycline and metronidazole for this outpatient but she was not able to  prior to coming in.  No associated numbness or sensorimotor concerns.  -Images taken per Ortho available in media and per their note  -Appreciate orthopedics input, performed I&D              - continue IV abx              - Follow-up to be determined  -Pain management: As needed Tylenol and oxycodone 2.5 mg every 4 hours as needed  -ABX: Clindamycin and Bactrim started in ED --> transition to doxycycline and metronidazole  -Tdap given in [facility] 4/11/24     Hx right PICA and small scattered right PCA territory ischemic strokes (6/2020)   MRI brain showed an acute right PICA and small scattered right PCA territory ischemic strokes.  Case was discussed with [facility] neurology. Given NIH stroke scale of 0 at the time of arrival to emergency department and timeframe near 4.5 hours at the time of decision making, decision was made not to treat with tPA or neuro interventional radiology.  No residual symptoms  -Continue DOAC     Atrial flutter  History of borderline right atrial pressure  Severely enlarged left atrium  Severe tricuspid regurgitation  Prior stroke was felt to be cardioembolic.. TTE 6/2020 with above findings.  Follows with cardiology at [facility] and was last assessed per nursing  "9/2023.  -Continue PTA Eliquis  -Continue PTA metoprolol with hold parameters  -hold digoxin- last fill 10/4/23 for 90 days, unclear if taking  - digoxin level      Osteoporosis-continue PTA Fosamax on discharge  HLD-continue statin        Diet:  regular  DVT Prophylaxis: DOAC  Snyder Catheter: Not present  Lines: None     Cardiac Monitoring: None  Code Status:  DNR/DNI       Observation Goals: -diagnostic tests and consults completed and resulted, -vital signs normal or at patient baseline, -adequate pain control on oral analgesics, -tolerating oral antibiotics or has plans for home infusion setup, -infection is improving, Nurse to notify provider when observation goals have been met and patient is ready for discharge.  Diet: Regular Diet Adult    DVT Prophylaxis: DOAC  Snyder Catheter: Not present  Lines: None     Cardiac Monitoring: None  Code Status: Full Code      Clinically Significant Risk Factors Present on Admission          # Hypocalcemia: Lowest Ca = 8.3 mg/dL in last 2 days, will monitor and replace as appropriate      # Drug Induced Coagulation Defect: home medication list includes an anticoagulant medication         # Overweight: Estimated body mass index is 25.86 kg/m  as calculated from the following:    Height as of this encounter: 1.6 m (5' 3\").    Weight as of this encounter: 66.2 kg (146 lb).              Disposition Plan     Medically Ready for Discharge: Anticipated Tomorrow             SHARLA OCHOA MD  Hospitalist Service, GOLD TEAM 22  [facility]  Securely message with [employer] (more info)  Text page via [employer] Paging/Directory   See signed in provider for up to date coverage information  ______________________________________________________________________    Interval History   Still having L sided hand swelling and redness. Improved since yesterday. Still having pain in the wrist and fingers. No focal joint pain or swelling. Able to move " fingers    Physical Exam   Vital Signs: Temp: 97.6  F (36.4  C) Temp src: Oral BP: 115/69 Pulse: 84   Resp: 20 SpO2: 94 % O2 Device: None (Room air)    Weight: 146 lbs 0 oz    GENERAL: Alert and oriented x 3. NAD.  Able to sit upright unassisted. Cooperative.   HEENT: Anicteric sclera.  NC. AT.  Pupils equal and round  CV: No murmurs.  Irregularly irregular rate and rhythm  RESPIRATORY: Effort normal on RA. Lungs CTAB with no wheezing, rales, rhonchi.   GI: Abdomen soft, NT, NABS  NEUROLOGICAL: No focal deficits. Moves all extremities.    EXTREMITIES: + Blanchable erythema and edema of all 5 digits in visible areas of the distal hand circumferentially, Ace wraps in place extending to the distal palmar crease that are CDI.  No other peripheral edema. Intact bilateral pedal pulses. Erythema and swelling improved. TTP improved in fingers.  SKIN: No jaundice. No other rashes.    Medical Decision Making       35 MINUTES SPENT BY ME on the date of service doing chart review, history, exam, documentation & further activities per the note.      Data     I have personally reviewed the following data over the past 24 hrs:    14.7 (H)  \   13.3   / 212     137 104 22.8 /  87   4.3 26 0.84 \     Procal: N/A CRP: N/A Lactic Acid: N/A         Imaging results reviewed over the past 24 hrs:   Recent Results (from the past 24 hour(s))   XR Hand Left G/E 3 Views    Narrative    EXAM: XR HAND LEFT G/E 3 VIEWS  LOCATION: [facility]  DATE: 4/11/2024    INDICATION: Cat bite, r o retained tooth  COMPARISON: None.      Impression    IMPRESSION: No definite radiopaque foreign body is identified. Soft tissue swelling dorsally. No acute fracture or malalignment. Severe STT and fourth DIP joint degenerative changes. Otherwise mild to moderate degenerative changes throughout the hand and   wrist. Osteopenia. Positive ulnar variance.

## 2024-04-12 NOTE — TELEPHONE ENCOUNTER
[facility]    Phone Message    May a detailed message be left on voicemail: yes     Reason for Call: Other: Contacted Agus today to schedule an urgent referral appointment for cat bite. Soonest available scheduled on 4/29. Please contact him back at [phone] if she is able to be seen sooner than this.       Action Taken: Message routed to:  [facility] ([facility] Ortho    Travel Screening: Not Applicable

## 2024-04-12 NOTE — PHARMACY-ADMISSION MEDICATION HISTORY
Pharmacist Admission Medication History    Admission medication history is complete. The information provided in this note is only as accurate as the sources available at the time of the update.    Information Source(s): Family member (son Agus and grandtony Barber), [facility] and [facility] in [phone]  via in-person    Pertinent Information:   Agus had pictures on phone of PTA meds but they were taken a while ago so there could have been changes since. He has photos of Rx bottles for apixaban, Areds 2, vitamin D3. He did not have pictures of digoxin or metoprolol.   shanice Barber, sets up pill boxes for patient. He was uncertain if patient currently taking digoxin or metoprolol. He will call unit pharmacist when he gets home and has med list and Rx bottles with him to clarify.  I called [facility] pharmacy to see if they had any recent fills. Digoxin hasnt been filled since 10/2023. Metoprolol hasnt been filled since 8/2023. Apixaban last filled 8/2023. Removed digoxin and metoprolol from list as patient does not appear to be getting them PTA. Left apixaban on list as son did have picture of Rx bottle so may have supply at home.     Patient was prescribed doxycycline and metronidazole on 4/11/24 but did not  or start taking these PTA as she presented to the ED 4/11pm.     Changes made to PTA medication list:  Added: None  Deleted:   Metoprolol succinate 25 mg XL - 25 mg daily - not filled since 8/2023, family unsure if still current  digoxin 125 mcg - 125 mcg daily - not filled since 10/2023, family unsure if still current  Mupirocin ointment - completed per family   Changed: trazodone from 75 mg at 6-7pm to 25 mg (half of 50 mg tablet) at bedtime per shanice Barber - last filled 11/244/2023 for 90 day supply     Allergies reviewed with patient and updates made in EHR: no    Medication History Completed By: Milan Riggs RPH 4/12/2024 3:15 PM    PTA Med List   Medication Sig  Last Dose    Alendronate Sodium (FOSAMAX PO) Take 70 mg by mouth once a week Unknown    calcium-vitamin D (CALTRATE) 600-400 MG-UNIT per tablet Take 2 tablets by mouth daily Unknown    ELIQUIS ANTICOAGULANT 2.5 MG tablet Take 1 Tablet (2.5 mg) by mouth two times a day.* 4/11/2024 at am    Multiple Vitamins-Minerals (PRESERVISION AREDS 2) CAPS Take 1 capsule by mouth 2 times daily 4/11/2024    multivitamin, therapeutic (THERA-VIT) TABS Take 1 tablet by mouth daily 4/11/2024    Vitamin D3 (CHOLECALCIFEROL) 25 mcg (1000 units) tablet Take 1,000 Units by mouth daily 4/11/2024

## 2024-04-13 LAB
ANION GAP SERPL CALCULATED.3IONS-SCNC: 7 MMOL/L (ref 7–15)
BASOPHILS # BLD AUTO: 0.1 10E3/UL (ref 0–0.2)
BASOPHILS NFR BLD AUTO: 1 %
BUN SERPL-MCNC: 24.2 MG/DL (ref 8–23)
CALCIUM SERPL-MCNC: 8.5 MG/DL (ref 8.2–9.6)
CHLORIDE SERPL-SCNC: 106 MMOL/L (ref 98–107)
CREAT SERPL-MCNC: 0.77 MG/DL (ref 0.51–0.95)
DEPRECATED HCO3 PLAS-SCNC: 25 MMOL/L (ref 22–29)
EGFRCR SERPLBLD CKD-EPI 2021: 71 ML/MIN/1.73M2
EOSINOPHIL # BLD AUTO: 0.4 10E3/UL (ref 0–0.7)
EOSINOPHIL NFR BLD AUTO: 4 %
ERYTHROCYTE [DISTWIDTH] IN BLOOD BY AUTOMATED COUNT: 14.5 % (ref 10–15)
GLUCOSE BLDC GLUCOMTR-MCNC: 108 MG/DL (ref 70–99)
GLUCOSE SERPL-MCNC: 91 MG/DL (ref 70–99)
HCT VFR BLD AUTO: 40.9 % (ref 35–47)
HGB BLD-MCNC: 13.5 G/DL (ref 11.7–15.7)
IMM GRANULOCYTES # BLD: 0.1 10E3/UL
IMM GRANULOCYTES NFR BLD: 1 %
LYMPHOCYTES # BLD AUTO: 1.3 10E3/UL (ref 0.8–5.3)
LYMPHOCYTES NFR BLD AUTO: 12 %
MCH RBC QN AUTO: 30.9 PG (ref 26.5–33)
MCHC RBC AUTO-ENTMCNC: 33 G/DL (ref 31.5–36.5)
MCV RBC AUTO: 94 FL (ref 78–100)
MONOCYTES # BLD AUTO: 1.3 10E3/UL (ref 0–1.3)
MONOCYTES NFR BLD AUTO: 12 %
NEUTROPHILS # BLD AUTO: 8.1 10E3/UL (ref 1.6–8.3)
NEUTROPHILS NFR BLD AUTO: 70 %
NRBC # BLD AUTO: 0 10E3/UL
NRBC BLD AUTO-RTO: 0 /100
PLATELET # BLD AUTO: 231 10E3/UL (ref 150–450)
POTASSIUM SERPL-SCNC: 4.6 MMOL/L (ref 3.4–5.3)
RBC # BLD AUTO: 4.37 10E6/UL (ref 3.8–5.2)
SODIUM SERPL-SCNC: 138 MMOL/L (ref 135–145)
WBC # BLD AUTO: 11.3 10E3/UL (ref 4–11)

## 2024-04-13 PROCEDURE — 120N000002 HC R&B MED SURG/OB UMMC

## 2024-04-13 PROCEDURE — 80048 BASIC METABOLIC PNL TOTAL CA: CPT | Performed by: STUDENT IN AN ORGANIZED HEALTH CARE EDUCATION/TRAINING PROGRAM

## 2024-04-13 PROCEDURE — 250N000013 HC RX MED GY IP 250 OP 250 PS 637: Performed by: PHYSICIAN ASSISTANT

## 2024-04-13 PROCEDURE — 85025 COMPLETE CBC W/AUTO DIFF WBC: CPT | Performed by: STUDENT IN AN ORGANIZED HEALTH CARE EDUCATION/TRAINING PROGRAM

## 2024-04-13 PROCEDURE — 82962 GLUCOSE BLOOD TEST: CPT

## 2024-04-13 PROCEDURE — G0378 HOSPITAL OBSERVATION PER HR: HCPCS

## 2024-04-13 PROCEDURE — 96366 THER/PROPH/DIAG IV INF ADDON: CPT

## 2024-04-13 PROCEDURE — 99233 SBSQ HOSP IP/OBS HIGH 50: CPT | Performed by: STUDENT IN AN ORGANIZED HEALTH CARE EDUCATION/TRAINING PROGRAM

## 2024-04-13 PROCEDURE — 36415 COLL VENOUS BLD VENIPUNCTURE: CPT | Performed by: STUDENT IN AN ORGANIZED HEALTH CARE EDUCATION/TRAINING PROGRAM

## 2024-04-13 PROCEDURE — 250N000013 HC RX MED GY IP 250 OP 250 PS 637: Performed by: INTERNAL MEDICINE

## 2024-04-13 PROCEDURE — 250N000013 HC RX MED GY IP 250 OP 250 PS 637: Performed by: STUDENT IN AN ORGANIZED HEALTH CARE EDUCATION/TRAINING PROGRAM

## 2024-04-13 PROCEDURE — 250N000011 HC RX IP 250 OP 636: Mod: JZ | Performed by: PHYSICIAN ASSISTANT

## 2024-04-13 PROCEDURE — 96376 TX/PRO/DX INJ SAME DRUG ADON: CPT

## 2024-04-13 RX ADMIN — APIXABAN 2.5 MG: 2.5 TABLET, FILM COATED ORAL at 07:48

## 2024-04-13 RX ADMIN — METRONIDAZOLE 500 MG: 5 INJECTION, SOLUTION INTRAVENOUS at 21:13

## 2024-04-13 RX ADMIN — Medication 25 MCG: at 07:48

## 2024-04-13 RX ADMIN — APIXABAN 2.5 MG: 2.5 TABLET, FILM COATED ORAL at 19:56

## 2024-04-13 RX ADMIN — Medication 2.5 MG: at 20:27

## 2024-04-13 RX ADMIN — DOXYCYCLINE 100 MG: 100 INJECTION, POWDER, LYOPHILIZED, FOR SOLUTION INTRAVENOUS at 19:55

## 2024-04-13 RX ADMIN — THERA TABS 1 TABLET: TAB at 07:48

## 2024-04-13 RX ADMIN — Medication 1 CAPSULE: at 07:48

## 2024-04-13 RX ADMIN — ACETAMINOPHEN 975 MG: 325 TABLET, FILM COATED ORAL at 19:55

## 2024-04-13 RX ADMIN — DOXYCYCLINE 100 MG: 100 INJECTION, POWDER, LYOPHILIZED, FOR SOLUTION INTRAVENOUS at 07:47

## 2024-04-13 RX ADMIN — METRONIDAZOLE 500 MG: 5 INJECTION, SOLUTION INTRAVENOUS at 04:51

## 2024-04-13 RX ADMIN — METRONIDAZOLE 500 MG: 5 INJECTION, SOLUTION INTRAVENOUS at 12:03

## 2024-04-13 RX ADMIN — ACETAMINOPHEN 975 MG: 325 TABLET, FILM COATED ORAL at 12:03

## 2024-04-13 RX ADMIN — METOPROLOL SUCCINATE 25 MG: 25 TABLET, EXTENDED RELEASE ORAL at 07:48

## 2024-04-13 ASSESSMENT — ACTIVITIES OF DAILY LIVING (ADL)
ADLS_ACUITY_SCORE: 33
ADLS_ACUITY_SCORE: 42
CONCENTRATING,_REMEMBERING_OR_MAKING_DECISIONS_DIFFICULTY: NO
ADLS_ACUITY_SCORE: 42
ADLS_ACUITY_SCORE: 42
TOILETING_ISSUES: NO
WALKING_OR_CLIMBING_STAIRS_DIFFICULTY: YES
DIFFICULTY_EATING/SWALLOWING: NO
ADLS_ACUITY_SCORE: 42
DRESSING/BATHING_DIFFICULTY: NO
WEAR_GLASSES_OR_BLIND: NO
ADLS_ACUITY_SCORE: 33
ADLS_ACUITY_SCORE: 42
ADLS_ACUITY_SCORE: 33
ADLS_ACUITY_SCORE: 42
DOING_ERRANDS_INDEPENDENTLY_DIFFICULTY: YES
ADLS_ACUITY_SCORE: 42
WALKING_OR_CLIMBING_STAIRS: AMBULATION DIFFICULTY, ASSISTANCE 1 PERSON
ADLS_ACUITY_SCORE: 33
ADLS_ACUITY_SCORE: 42
EQUIPMENT_CURRENTLY_USED_AT_HOME: WALKER, ROLLING
ADLS_ACUITY_SCORE: 33
ADLS_ACUITY_SCORE: 42
ADLS_ACUITY_SCORE: 42
ADLS_ACUITY_SCORE: 33
ADLS_ACUITY_SCORE: 42
ADLS_ACUITY_SCORE: 42
FALL_HISTORY_WITHIN_LAST_SIX_MONTHS: NO
ADLS_ACUITY_SCORE: 33
CHANGE_IN_FUNCTIONAL_STATUS_SINCE_ONSET_OF_CURRENT_ILLNESS/INJURY: NO

## 2024-04-13 NOTE — PLAN OF CARE
"Goal Outcome Evaluation:    VS: /67 (BP Location: Right arm)   Pulse 66   Temp 98.1  F (36.7  C) (Oral)   Resp 18   Ht 1.6 m (5' 3\")   Wt 66.2 kg (146 lb)   SpO2 97%   BMI 25.86 kg/m     O2: Stable on room air, denies chest pain and shortness of breath   Output: Voids spontaneously without difficulty   Last BM: 4/13/2024   Activity: SBA with walker    Up for meals? Yes    Skin: L hand swelling and redness   Pain: Managed with PRN oxycodone, and scheduled tylenol   CMS: Intact, denies numbness and tingling, A/Ox4   Dressing: Clean dry and intact    Diet: Regular    LDA: R PIV- SL    Equipment: IV pump and pole, personal belongings, call light within reach    Plan: TBD, continue POC         Plan of Care Reviewed With: patient    Overall Patient Progress: improvingOverall Patient Progress: improving           "

## 2024-04-13 NOTE — PROGRESS NOTES
[facility]    Medicine Progress Note - Hospitalist Service, GOLD TEAM 22    Date of Admission:  4/11/2024    Assessment & Plan   Hailey Alexis is a 89F w/ PMH history of right PICA and small scattered right PCA territory ischemic strokes (6/2020), atrial flutter, osteoporosis, macular degeneration admitted observation status on 4/11/2024 for a cat bite to the left hand.     Today's Plan:  - continue IV abx.  - since clinically not improving, will obtain MRI hand.  - monitor fever curve. Monitor WBC    Cat bite to left hand (~4/8/24)  Patient was initially prescribed doxycycline and metronidazole for this outpatient but she was not able to  prior to coming in.  No associated numbness or sensorimotor concerns.  -Images taken per Ortho available in media and per their note  -Appreciate orthopedics input, performed I&D              - continue IV abx              - Follow-up to be determined  -Pain management: As needed Tylenol and oxycodone 2.5 mg every 4 hours as needed  -ABX: Clindamycin and Bactrim started in ED --> transition to doxycycline and metronidazole  -Tdap given in [facility] 4/11/24     Hx right PICA and small scattered right PCA territory ischemic strokes (6/2020)   MRI brain showed an acute right PICA and small scattered right PCA territory ischemic strokes.  Case was discussed with [facility] neurology. Given NIH stroke scale of 0 at the time of arrival to emergency department and timeframe near 4.5 hours at the time of decision making, decision was made not to treat with tPA or neuro interventional radiology.  No residual symptoms  -Continue DOAC     Atrial flutter  History of borderline right atrial pressure  Severely enlarged left atrium  Severe tricuspid regurgitation  Prior stroke was felt to be cardioembolic.. TTE 6/2020 with above findings.  Follows with cardiology at [facility] and was last assessed per nursing 9/2023.  -Continue PTA  "Eliquis  -Continue PTA metoprolol with hold parameters  -hold digoxin- last fill 10/4/23 for 90 days, unclear if taking  - digoxin level      Osteoporosis-continue PTA Fosamax on discharge  HLD-continue statin        Diet:  regular  DVT Prophylaxis: DOAC  Snyder Catheter: Not present  Lines: None     Cardiac Monitoring: None  Code Status:  DNR/DNI       Observation Goals: -diagnostic tests and consults completed and resulted, -vital signs normal or at patient baseline, -adequate pain control on oral analgesics, -tolerating oral antibiotics or has plans for home infusion setup, -infection is improving, Nurse to notify provider when observation goals have been met and patient is ready for discharge.  Diet: Regular Diet Adult    DVT Prophylaxis: DOAC  Snyder Catheter: Not present  Lines: None     Cardiac Monitoring: None  Code Status: Full Code      Clinically Significant Risk Factors Present on Admission          # Hypocalcemia: Lowest Ca = 8.3 mg/dL in last 2 days, will monitor and replace as appropriate        # Drug Induced Coagulation Defect: home medication list includes an anticoagulant medication         # Overweight: Estimated body mass index is 25.86 kg/m  as calculated from the following:    Height as of this encounter: 1.6 m (5' 3\").    Weight as of this encounter: 66.2 kg (146 lb).              Disposition Plan     Medically Ready for Discharge: Anticipated Tomorrow             SHARLA OCHOA MD  Hospitalist Service, GOLD TEAM [address] [place] [address]  Securely message with [employer] (more info)  Text page via [facility] Paging/Directory   See signed in provider for up to date coverage information  ______________________________________________________________________    Interval History   Still having L sided hand swelling and redness. Improved only slightly since yesterday. Still having pain in the wrist and fingers. Pain improved since before. No focal joint pain or " swelling.    Physical Exam   Vital Signs: Temp: 97.8  F (36.6  C) Temp src: Oral BP: (!) 138/90 Pulse: 85   Resp: 18 SpO2: 96 % O2 Device: None (Room air)    Weight: 146 lbs 0 oz    GENERAL: Alert and oriented x 3. NAD.  Able to sit upright unassisted. Cooperative.   HEENT: Anicteric sclera.  NC. AT.  Pupils equal and round  CV: No murmurs.  Irregularly irregular rate and rhythm  RESPIRATORY: Effort normal on RA. Lungs CTAB with no wheezing, rales, rhonchi.   GI: Abdomen soft, NT, NABS  NEUROLOGICAL: No focal deficits. Moves all extremities.    EXTREMITIES: + Blanchable erythema and edema of all 5 digits in visible areas of the distal hand circumferentially-- seems to be improved slightly since yesterday, Ace wraps in place extending to the distal palmar crease that are CDI.  No other peripheral edema. Intact bilateral pedal pulses. Erythema and swelling improved. TTP improved in fingers.  SKIN: No jaundice. No other rashes.    Medical Decision Making       35 MINUTES SPENT BY ME on the date of service doing chart review, history, exam, documentation & further activities per the note.      Data         Imaging results reviewed over the past 24 hrs:   No results found for this or any previous visit (from the past 24 hour(s)).

## 2024-04-13 NOTE — PLAN OF CARE
Goal Outcome Evaluation:    [id number]      Plan of Care Reviewed With: patient    Overall Patient Progress: no changeOverall Patient Progress: no change    Outcome Evaluation: Patient slept comfortably throughout the night. Q 4 vitals were taken. Antibiotics run throughtout the night as scheduled. IV patent. Complianed of pain in hand and tylenol given.

## 2024-04-13 NOTE — UTILIZATION REVIEW
Admission Status; Secondary Review Determination       Under the authority of the Utilization Management Committee, the utilization review process indicated a secondary review on the above patient. The review outcome is based on review of the medical records, discussions with staff, and applying clinical experience noted on the date of the review.     (x) Inpatient Status Appropriate - This patient's medical care is consistent with medical management for inpatient care and reasonable inpatient medical practice.     RATIONALE FOR DETERMINATION     Hailey Alexis is a 89 female with history of right PICA and small scattered right PCA territory ischemic strokes (6/2020), atrial flutter, osteoporosis, and macular degeneration. She presented to the ED on 4/11/2024 for evaluation of a cat bite to the left hand.  The bite occurred on 4/8/2024.  She was seen earlier in the outpatient setting and was prescribed oral doxycycline and oral Flagyl.  She was unable to  those prescriptions, however.  The swelling of her hand worsened.  She presented to the emergency department on 4/11/2024 for evaluation.  Emergency department evaluation showed tachycardia, .49, white blood cells 16.4, and x-ray without foreign body.  Orthopedic surgery was consulted from the emergency department.  They performed bedside irrigation and debridement.  Patient was admitted for further cares.  She is been treated with IV doxycycline and Flagyl due to penicillin allergy (anaphylaxis).  Patient remains on IV antibiotics.  There is still concern about deeper infection.  MRI of left hand and wrist are ordered for today.  Inpatient admission is appropriate for ongoing treatment of cat bite with persistent concern for deeper infection after bedside irrigation and debridement and 2 days of IV antibiotics.  Patient has failed observation care and inpatient admission is appropriate.    At the time of admission with the information  available to the attending physician more than 2 nights Hospital complex care was anticipated, based on patient risk of adverse outcome if treated as outpatient and complex care required. Inpatient admission is appropriate based on the Medicare guidelines.     This document was produced using voice recognition software       The information on this document is developed by the utilization review team in order for the business office to ensure compliance. This only denotes the appropriateness of proper admission status and does not reflect the quality of care rendered.   The definitions of Inpatient Status and Observation Status used in making the determination above are those provided in the CMS Coverage Manual, Chapter 1 and Chapter 6, section 70.4.   Sincerely,     Dash Shane MD    Utilization Review  Physician Advisor  [facility].

## 2024-04-14 ENCOUNTER — APPOINTMENT (OUTPATIENT)
Dept: MRI IMAGING | Facility: CLINIC | Age: 89
DRG: 603 | End: 2024-04-14
Attending: STUDENT IN AN ORGANIZED HEALTH CARE EDUCATION/TRAINING PROGRAM
Payer: COMMERCIAL

## 2024-04-14 PROCEDURE — 255N000002 HC RX 255 OP 636: Performed by: STUDENT IN AN ORGANIZED HEALTH CARE EDUCATION/TRAINING PROGRAM

## 2024-04-14 PROCEDURE — 73220 MRI UPPR EXTREMITY W/O&W/DYE: CPT | Mod: LT

## 2024-04-14 PROCEDURE — 250N000013 HC RX MED GY IP 250 OP 250 PS 637: Performed by: PHYSICIAN ASSISTANT

## 2024-04-14 PROCEDURE — 73223 MRI JOINT UPR EXTR W/O&W/DYE: CPT | Mod: LT

## 2024-04-14 PROCEDURE — 99233 SBSQ HOSP IP/OBS HIGH 50: CPT | Performed by: STUDENT IN AN ORGANIZED HEALTH CARE EDUCATION/TRAINING PROGRAM

## 2024-04-14 PROCEDURE — 999N000127 HC STATISTIC PERIPHERAL IV START W US GUIDANCE

## 2024-04-14 PROCEDURE — 120N000002 HC R&B MED SURG/OB UMMC

## 2024-04-14 PROCEDURE — 250N000013 HC RX MED GY IP 250 OP 250 PS 637: Performed by: INTERNAL MEDICINE

## 2024-04-14 PROCEDURE — 250N000013 HC RX MED GY IP 250 OP 250 PS 637: Performed by: STUDENT IN AN ORGANIZED HEALTH CARE EDUCATION/TRAINING PROGRAM

## 2024-04-14 PROCEDURE — 99222 1ST HOSP IP/OBS MODERATE 55: CPT | Performed by: INTERNAL MEDICINE

## 2024-04-14 PROCEDURE — A9585 GADOBUTROL INJECTION: HCPCS | Performed by: STUDENT IN AN ORGANIZED HEALTH CARE EDUCATION/TRAINING PROGRAM

## 2024-04-14 PROCEDURE — 250N000011 HC RX IP 250 OP 636: Performed by: PHYSICIAN ASSISTANT

## 2024-04-14 RX ORDER — GADOBUTROL 604.72 MG/ML
0.1 INJECTION INTRAVENOUS ONCE
Status: COMPLETED | OUTPATIENT
Start: 2024-04-14 | End: 2024-04-14

## 2024-04-14 RX ORDER — METHYLPREDNISOLONE SOD SUCC 125 MG
125 VIAL (EA) INJECTION
Status: DISCONTINUED | OUTPATIENT
Start: 2024-04-14 | End: 2024-04-14

## 2024-04-14 RX ORDER — METHYLPREDNISOLONE SODIUM SUCCINATE 125 MG/2ML
125 INJECTION, POWDER, LYOPHILIZED, FOR SOLUTION INTRAMUSCULAR; INTRAVENOUS
Status: DISCONTINUED | OUTPATIENT
Start: 2024-04-14 | End: 2024-04-18 | Stop reason: HOSPADM

## 2024-04-14 RX ORDER — DIPHENHYDRAMINE HYDROCHLORIDE 50 MG/ML
25 INJECTION INTRAMUSCULAR; INTRAVENOUS EVERY 6 HOURS PRN
Status: DISCONTINUED | OUTPATIENT
Start: 2024-04-14 | End: 2024-04-18 | Stop reason: HOSPADM

## 2024-04-14 RX ORDER — DIPHENHYDRAMINE HCL 25 MG
25 CAPSULE ORAL EVERY 6 HOURS PRN
Status: DISCONTINUED | OUTPATIENT
Start: 2024-04-14 | End: 2024-04-18 | Stop reason: HOSPADM

## 2024-04-14 RX ORDER — METRONIDAZOLE 500 MG/1
500 TABLET ORAL EVERY 8 HOURS
Status: DISCONTINUED | OUTPATIENT
Start: 2024-04-14 | End: 2024-04-17

## 2024-04-14 RX ORDER — CEFPODOXIME PROXETIL 100 MG/5ML
20 GRANULE, FOR SUSPENSION ORAL ONCE
Qty: 1 ML | Refills: 0 | Status: COMPLETED | OUTPATIENT
Start: 2024-04-14 | End: 2024-04-14

## 2024-04-14 RX ORDER — DOXYCYCLINE 100 MG/1
100 CAPSULE ORAL EVERY 12 HOURS SCHEDULED
Status: DISCONTINUED | OUTPATIENT
Start: 2024-04-14 | End: 2024-04-15

## 2024-04-14 RX ADMIN — ACETAMINOPHEN 975 MG: 325 TABLET, FILM COATED ORAL at 11:35

## 2024-04-14 RX ADMIN — GADOBUTROL 6.6 ML: 604.72 INJECTION INTRAVENOUS at 14:56

## 2024-04-14 RX ADMIN — METRONIDAZOLE 500 MG: 5 INJECTION, SOLUTION INTRAVENOUS at 15:01

## 2024-04-14 RX ADMIN — ACETAMINOPHEN 975 MG: 325 TABLET, FILM COATED ORAL at 19:39

## 2024-04-14 RX ADMIN — APIXABAN 2.5 MG: 2.5 TABLET, FILM COATED ORAL at 19:39

## 2024-04-14 RX ADMIN — THERA TABS 1 TABLET: TAB at 08:09

## 2024-04-14 RX ADMIN — Medication 2.5 MG: at 09:37

## 2024-04-14 RX ADMIN — CEFPODOXIME PROXETIL 20 MG: 100 GRANULE, FOR SUSPENSION ORAL at 17:18

## 2024-04-14 RX ADMIN — METRONIDAZOLE 500 MG: 5 INJECTION, SOLUTION INTRAVENOUS at 04:35

## 2024-04-14 RX ADMIN — DOXYCYCLINE 100 MG: 100 INJECTION, POWDER, LYOPHILIZED, FOR SOLUTION INTRAVENOUS at 08:09

## 2024-04-14 RX ADMIN — DOXYCYCLINE HYCLATE 100 MG: 100 CAPSULE ORAL at 19:39

## 2024-04-14 RX ADMIN — ACETAMINOPHEN 975 MG: 325 TABLET, FILM COATED ORAL at 04:29

## 2024-04-14 RX ADMIN — METRONIDAZOLE 500 MG: 500 TABLET ORAL at 22:39

## 2024-04-14 RX ADMIN — APIXABAN 2.5 MG: 2.5 TABLET, FILM COATED ORAL at 08:09

## 2024-04-14 RX ADMIN — METOPROLOL SUCCINATE 25 MG: 25 TABLET, EXTENDED RELEASE ORAL at 08:09

## 2024-04-14 RX ADMIN — Medication 25 MCG: at 08:09

## 2024-04-14 RX ADMIN — Medication 1 CAPSULE: at 09:38

## 2024-04-14 ASSESSMENT — ACTIVITIES OF DAILY LIVING (ADL)
ADLS_ACUITY_SCORE: 33

## 2024-04-14 NOTE — PLAN OF CARE
"Goal Outcome Evaluation:    VS: /69 (BP Location: Right arm)   Pulse 72   Temp 97.7  F (36.5  C) (Oral)   Resp 16   Ht 1.6 m (5' 3\")   Wt 66.2 kg (146 lb)   SpO2 96%   BMI 25.86 kg/m     O2: Stable on room air, denies chest pain and shortness of breath   Output: Voids spontaneously without difficulty   Activity: SBA with walker    Up for meals? Yes   Skin: Intact    Pain: Managed with scheduled tylenol and PRN oxycodone    CMS: Intact, denies numbness and tingling, A/Ox4   Dressing: N/a    Diet: Regular    LDA: L PIV- SL    Equipment: IV pump and pole, personal belongings, call light within reach    Plan: TBD,  continue POC, possible discharge tomorrow         Plan of Care Reviewed With: patient    Overall Patient Progress: improvingOverall Patient Progress: improving           "

## 2024-04-14 NOTE — CONSULTS
[facility] (formerly [facility]) ID SERVICE: NEW CONSULTATION  Patient:  Hailey Alexis, Date of birth 2/10/1930, Medical record number [id number]  Date of Admission: 4/11/2024  Date of Visit:  4/14/2024  Requesting Provider: Jan Gary         Assessment and Recommendations:   Problem List:  Infected cat bite, improvement in WBC on doxycycline and metronidazole but concern from primary team for ongoing swelling. Tetanus booster given 4/11/24.  Penicilln allergy - patient reports rash not requiring hospitalization about 20 years ago. Unclear how immediate the rash was. She doesn't remember many of the specifics but is quite sure she had no face/tongue swelling or difficulty breathing and that she did not require a hospital stay for it.     Discussion:  Hailey Alexis is a 89 yo woman with history of a cat bite to her left hand which developed swelling and pain about 48 hours after the bite. Due to her listed anaphylaxis to penicillin, she was started on an alternative regimen for cat bite of doxycycline and metronidazole. These have been given IV to date. This combo does give overall good bite coverage, though it is suboptimal for streptococci. Given degree of swelling, agree with MRI as planned. Since she has had slow improvement on her current regimen and her penicillin allergy was not anaphylaxis, we could give her a test dose of ceftriaxone (or cefpodoxime if no IV access) so we have an option to switch to if needed.     Recommendations:  Give test dose cefpodoxime PO solution 20 mg x 1 (1/10 normal dose). If no immediate reaction (extremely unlikely this will occur), could use a 3rd generation cephalosporin for her as needed.   For now, change to PO doxycycline and metronidazole (same doses as IV). Depending on how test dose of cephalosporin goes and MRI findings, may consider switching doxycycline to either IV ceftriaxone 2g daily or oral cefpodoxime 200 mg PO Q12H for definitive treatment.   Await MRI  planned by primary team to assess for fluid collection or tenosynovitis   Recheck CRP with next labs.     Recommendations discussed with primary team.     Thank you for this consult. ID will continue to follow this patient. Please feel free to call with any questions. Dr. Kearney and iMri Agosto will take over the [facility] ID team starting 4/15/24.     Sobia Juarez MD  Infectious Diseases         History of Present Illness:   Hailey Alexis is a 89 yo woman who is overall in good health but is admitted with an infected cat bite to her left hand. She reports that her cat has bitten her before but that this was the first time it's been infected. The infection started about 2 days after the bite with swelling and pain. She sought care at her PCP clinic and received a tetanus. Because of her listed penicillin allergy she was started on the recommended alternative treatment for infected cat/dog bite of doxycycline and metronidazole. However before she had a chance to pick it up she was admitted for further evaluation. She was started on IV doxycycline and metronidazole. She reports improvement in pain but has had ongoing erythema and swelling. Her WBC has improved since admission. Ortho has evaluated her with no intervention currently planned. Primary team plans MRI to better assess given ongoing swelling.        Past Medical History:     Past Medical History:   Diagnosis Date    Cerebrovascular accident (H)     Chronic atrial fibrillation (H)     Hard of hearing          Allergies:      Allergies   Allergen Reactions    Penicillin G Anaphylaxis     Red and inflamed    Red and inflamed   Red and inflamed   Red and inflamed   Red and inflamed           Recent Antimicrobials::   Doxycycline 4/11-present  Metronidazole 4/11-present    Clindamycin x 1 4/11  TMP-SMX x 1 4/11       Social History:     Social History     Socioeconomic History    Marital status:      Spouse name: Not on file    Number  "of children: Not on file    Years of education: Not on file    Highest education level: Not on file   Occupational History    Not on file   Tobacco Use    Smoking status: Former     Types: Cigarettes    Smokeless tobacco: Never   Substance and Sexual Activity    Alcohol use: Yes     Alcohol/week: 1.0 standard drink of alcohol     Types: 1 Standard drinks or equivalent per week    Drug use: Not on file    Sexual activity: Not on file   Other Topics Concern    Not on file   Social History Narrative     twice. Now, has a new boyfriend who is 13 years younger.     Has 4 children.     L[place] in [place] [facility] [place] [facility] [place] with a grandson. Has an active social life.      Social Determinants of Health     Financial Resource Strain: Not on File (2022)    Received from [employer] [facility]     Financial Resource Strain     Financial Resource Strain: 0   Food Insecurity: Not on File (2022)    Received from [employer]     Food Insecurity     Food: 0   Transportation Needs: Not on File (2022)    Received from [employer]     Transportation Needs     Transportation: 0   Physical Activity: Not on File (2022)    Received from [employer]     Physical Activity     Physical Activity: 0   Stress: Not on File (2022)    Received from [facility]     Stress     Stress: 0   Social Connections: Not on File (2022)    Received from [facility]     Social Connections     Social Connections and Isolation: 0   Interpersonal Safety: Not on file   Housing Stability: Not on File (2022)    Received from [facility]     Housing Stability     Housin              Physical Exam:   BP (!) 153/90 (BP Location: Right arm, Patient Position: Sitting)   Pulse 68   Temp 98.7  F (37.1  C) (Oral)   Resp 16   Ht 1.6 m (5' 3\")   Wt 66.2 kg (146 lb)   SpO2 98%   BMI 25.86 kg/m     Exam:  GENERAL:  Well-developed, well-nourished, sitting in chair in no acute distress.   ENT:  Head is normocephalic, atraumatic. Anterior " oropharynx without ulcers.  EYES:  Eyes have anicteric sclerae.    NECK:  Supple.  LUNGS:  Normal respiratory effort.   ABDOMEN:  Non-distended.   EXT: Left hand with difficulty extending fingers, warmth, redness and swelling of dorsum of hand. No drainage. No fluctuance noted.   SKIN:  No acute rashes.  Line is in place without any surrounding erythema.  NEUROLOGIC:  Grossly nonfocal.          Laboratory Data:     Creatinine   Date Value Ref Range Status   04/13/2024 0.77 0.51 - 0.95 mg/dL Final   04/12/2024 0.84 0.51 - 0.95 mg/dL Final   04/11/2024 0.83 0.51 - 0.95 mg/dL Final     WBC Count   Date Value Ref Range Status   04/13/2024 11.3 (H) 4.0 - 11.0 10e3/uL Final   04/12/2024 14.7 (H) 4.0 - 11.0 10e3/uL Final   04/11/2024 16.4 (H) 4.0 - 11.0 10e3/uL Final     Hemoglobin   Date Value Ref Range Status   04/13/2024 13.5 11.7 - 15.7 g/dL Final     Platelet Count   Date Value Ref Range Status   04/13/2024 231 150 - 450 10e3/uL Final     Lab Results   Component Value Date     04/13/2024    BUN 24.2 (H) 04/13/2024    CO2 25 04/13/2024        Pertinent Recent Microbiology Data:   Blood cultures not done on admission.   No microbiology to report.          Imaging:   MRI hand pending    XR L hand 4/11/24:   IMPRESSION: No definite radiopaque foreign body is identified. Soft tissue swelling dorsally. No acute fracture or malalignment. Severe STT and fourth DIP joint degenerative changes. Otherwise mild to moderate degenerative changes throughout the hand and wrist. Osteopenia. Positive ulnar variance.

## 2024-04-14 NOTE — PLAN OF CARE
Goal Outcome Evaluation:    [id number]      Plan of Care Reviewed With: patient    Overall Patient Progress: improvingOverall Patient Progress: improving    Outcome Evaluation: Patient was comfortable throught out the night. Q 4 vitals were taken. Stable VS. Antibiotics ran early in the night. Patient had scheduled MRI, and Imaging wanted to get it done before bed. Patient refused until morning. Complained of pain in right and left upper extremity. Oxy and tylenol given for pain.

## 2024-04-14 NOTE — PROGRESS NOTES
[facility]    Medicine Progress Note - Hospitalist Service, GOLD TEAM 22    Date of Admission:  4/11/2024    Assessment & Plan   Hailey Alexis is a 89F w/ PMH history of right PICA and small scattered right PCA territory ischemic strokes (6/2020), atrial flutter, osteoporosis, macular degeneration admitted observation status on 4/11/2024 for a cat bite to the left hand.     Today's Plan:  - switch to metronidazole po and doxycyline po since lost her IV and WBC improving  - MRI hand ordered-- not yet done. Will still try to get this since clinically does not seem to be improving as much as expected with current abx course.  - monitor fever curve. Monitor WBC  - will attempt penicillin trial (anaphylaxis rxn which is listed in the chart for penicillin seems to be inaccurate). Will give low dose of cefpodoxime (anaphylaxis meds ordered)    Cat bite to left hand (~4/8/24)  Patient was initially prescribed doxycycline and metronidazole for this outpatient but she was not able to  prior to coming in.  No associated numbness or sensorimotor concerns.  -Images taken per Ortho available in media and per their note  -Appreciate orthopedics input, performed I&D              - switched to po flagyl and doxycyline              - Follow-up to be determined  -Pain management: As needed Tylenol and oxycodone 2.5 mg every 4 hours as needed  - ID consulted  - hand and wrist MRI ordered-- pending     Hx right PICA and small scattered right PCA territory ischemic strokes (6/2020)   MRI brain showed an acute right PICA and small scattered right PCA territory ischemic strokes.  Case was discussed with [facility] neurology. Given NIH stroke scale of 0 at the time of arrival to emergency department and timeframe near 4.5 hours at the time of decision making, decision was made not to treat with tPA or neuro interventional radiology.  No residual symptoms  -Continue DOAC     Atrial  "flutter  History of borderline right atrial pressure  Severely enlarged left atrium  Severe tricuspid regurgitation  Prior stroke was felt to be cardioembolic.. TTE 6/2020 with above findings.  Follows with cardiology at [facility] and was last assessed per nursing 9/2023.  -Continue PTA Eliquis  -Continue PTA metoprolol with hold parameters  -hold digoxin- last fill 10/4/23 for 90 days, unclear if taking  - digoxin level      Osteoporosis-continue PTA Fosamax on discharge  HLD-continue statin        Diet:  regular  DVT Prophylaxis: DOAC  Snyder Catheter: Not present  Lines: None     Cardiac Monitoring: None  Code Status:  DNR/DNI          Diet: Regular Diet Adult    DVT Prophylaxis: DOAC  Snyder Catheter: Not present  Lines: None     Cardiac Monitoring: None  Code Status: Full Code      Clinically Significant Risk Factors Present on Admission                 # Drug Induced Coagulation Defect: home medication list includes an anticoagulant medication         # Overweight: Estimated body mass index is 25.86 kg/m  as calculated from the following:    Height as of this encounter: 1.6 m (5' 3\").    Weight as of this encounter: 66.2 kg (146 lb).              Disposition Plan     Medically Ready for Discharge: Anticipated Tomorrow             SHARLA OCHOA MD  Hospitalist Service, GOLD TEAM 22  [facility]  Securely message with TalkSession (more info)  Text page via [employer] Paging/Directory   See signed in provider for up to date coverage information  ______________________________________________________________________    Interval History   Still having L sided hand swelling and redness. Improved only slightly since yesterday. Still having pain in the wrist and fingers. Pain improved since before. No focal joint pain or swelling.    Physical Exam   Vital Signs: Temp: 98.7  F (37.1  C) Temp src: Oral BP: (!) 153/90 Pulse: 68   Resp: 16 SpO2: 98 % O2 Device: None (Room air)  "   Weight: 146 lbs 0 oz    GENERAL: Alert and oriented x 3. NAD.  Able to sit upright unassisted. Cooperative.   HEENT: Anicteric sclera.  NC. AT.  Pupils equal and round  CV: No murmurs.  Irregularly irregular rate and rhythm  RESPIRATORY: Effort normal on RA. Lungs CTAB with no wheezing, rales, rhonchi.   GI: Abdomen soft, NT, NABS  NEUROLOGICAL: No focal deficits. Moves all extremities.    EXTREMITIES: + Blanchable erythema and edema of all 5 digits in visible areas of the distal hand circumferentially-- seems to be improved slightly since yesterday, Ace wraps in place extending to the distal palmar crease that are CDI.  No other peripheral edema. Intact bilateral pedal pulses. Erythema and swelling improved. TTP improved in fingers.  SKIN: No jaundice. No other rashes.    Medical Decision Making       35 MINUTES SPENT BY ME on the date of service doing chart review, history, exam, documentation & further activities per the note.      Data     I have personally reviewed the following data over the past 24 hrs:    11.3 (H)  \   13.5   / 231     138 106 24.2 (H) /  91   4.6 25 0.77 \       Imaging results reviewed over the past 24 hrs:   No results found for this or any previous visit (from the past 24 hour(s)).

## 2024-04-14 NOTE — PROGRESS NOTES
.Orthopaedic Surgery Progress Note 04/14/2024    Subjective    No acute events overnight.  Pain well controlled. Denies new numbness, tingling, or weakness. Denies chest pain, SOB, chills, fevers.    Patient transitioned to Doxycycline and Metronidazole.    Objective  Temp: 97.4  F (36.3  C) Temp src: Oral BP: 136/82 Pulse: 61   Resp: 18 SpO2: 98 % O2 Device: None (Room air)      Exam:  Gen: No acute distress, resting comfortably in bed.  Resp: Non-labored breathing  Cardio: Regular rate via peripheral pulse    MSK:    LUE:  - Dressings c/d/I - removed to analyze wound, replaced with clean dressings (adaptic, gauze, loose coban), one strip of steri strip placed over the central portion of incision to loosely approximate edges  - Significantly reduced swelling and erythema, continues to improve  - Improved passive ROM of the wrist joint upon flexion and extension  - Actively flexes about 40 degrees  - Improved passive ROM of the digits upon flexion and extension at the MCP, PIP, DIP joints  - Actively flexes digits about MCP joint 45 degrees  - Fires EPL, FPL, Intrinsics  - SILT medial/radial/ulnar/axillary nerves  - Hand wwp    Recent Labs   Lab 04/13/24  0946 04/12/24  0710 04/11/24  1551   WBC 11.3* 14.7* 16.4*   HGB 13.5 13.3 15.3    212 262     .  CRP Inflammation   Date Value Ref Range Status   04/11/2024 117.49 (H) <5.00 mg/L Final     Assessment:     89-year-old female 2 days after a cat bite to the left dorsal aspect of the hand who appears to have an exacerbated infectious response consistent with cellulitis. Patient continues to improve.    Primary team to continue to trend inflammatory markers and to decide on antibiotic plan and duration. Recommend physical therapy for hand function and mobility.     Plan:  - Admit to internal medicine for antibiotic administration and infectious monitoring.  - Plan for OR: Not at this time  - Anticoagulation/DVT Prophylaxis: Per primary team  -  Antibiotics/Tetanus: Per primary team, recommend broad coverage including Pasteurella species which are common in cat bite wounds  - X-rays/Imaging: No further imaging recommended  - Activity: Up ad justus.  - Weight bearing: Weightbearing as tolerated left upper extremity  - Pain control: Per primary  - Diet: No restrictions or orthopedics  - Follow-up: Referral to hand specialist to evaluate the hand - ortho has messaged schedulers for 2 week follow up  - Disposition: Primary team to discharge    Chris Dumont MD  Resident Physician PGY-1  Orthopaedic Surgery  [phone]    Please page me with any questions/concerns. If there is no response, if it is a weekend, or if it is during evening hours then please page the orthopaedic surgery resident on call.     No future appointments.

## 2024-04-15 ENCOUNTER — PRE VISIT (OUTPATIENT)
Dept: ORTHOPEDICS | Facility: CLINIC | Age: 89
End: 2024-04-15

## 2024-04-15 LAB
CRP SERPL-MCNC: 50.14 MG/L
ERYTHROCYTE [DISTWIDTH] IN BLOOD BY AUTOMATED COUNT: 14.4 % (ref 10–15)
HCT VFR BLD AUTO: 40.2 % (ref 35–47)
HGB BLD-MCNC: 13.6 G/DL (ref 11.7–15.7)
MCH RBC QN AUTO: 30.9 PG (ref 26.5–33)
MCHC RBC AUTO-ENTMCNC: 33.8 G/DL (ref 31.5–36.5)
MCV RBC AUTO: 91 FL (ref 78–100)
MRSA DNA SPEC QL NAA+PROBE: NEGATIVE
PLATELET # BLD AUTO: 275 10E3/UL (ref 150–450)
RBC # BLD AUTO: 4.4 10E6/UL (ref 3.8–5.2)
SA TARGET DNA: NEGATIVE
WBC # BLD AUTO: 9.1 10E3/UL (ref 4–11)

## 2024-04-15 PROCEDURE — 36415 COLL VENOUS BLD VENIPUNCTURE: CPT | Performed by: STUDENT IN AN ORGANIZED HEALTH CARE EDUCATION/TRAINING PROGRAM

## 2024-04-15 PROCEDURE — 250N000013 HC RX MED GY IP 250 OP 250 PS 637: Performed by: PHYSICIAN ASSISTANT

## 2024-04-15 PROCEDURE — 85027 COMPLETE CBC AUTOMATED: CPT | Performed by: STUDENT IN AN ORGANIZED HEALTH CARE EDUCATION/TRAINING PROGRAM

## 2024-04-15 PROCEDURE — 87640 STAPH A DNA AMP PROBE: CPT | Performed by: STUDENT IN AN ORGANIZED HEALTH CARE EDUCATION/TRAINING PROGRAM

## 2024-04-15 PROCEDURE — 86140 C-REACTIVE PROTEIN: CPT | Performed by: STUDENT IN AN ORGANIZED HEALTH CARE EDUCATION/TRAINING PROGRAM

## 2024-04-15 PROCEDURE — 99233 SBSQ HOSP IP/OBS HIGH 50: CPT | Performed by: PHYSICIAN ASSISTANT

## 2024-04-15 PROCEDURE — 120N000002 HC R&B MED SURG/OB UMMC

## 2024-04-15 PROCEDURE — 250N000013 HC RX MED GY IP 250 OP 250 PS 637: Performed by: STUDENT IN AN ORGANIZED HEALTH CARE EDUCATION/TRAINING PROGRAM

## 2024-04-15 PROCEDURE — 99232 SBSQ HOSP IP/OBS MODERATE 35: CPT | Performed by: STUDENT IN AN ORGANIZED HEALTH CARE EDUCATION/TRAINING PROGRAM

## 2024-04-15 PROCEDURE — 250N000011 HC RX IP 250 OP 636: Performed by: STUDENT IN AN ORGANIZED HEALTH CARE EDUCATION/TRAINING PROGRAM

## 2024-04-15 PROCEDURE — 250N000013 HC RX MED GY IP 250 OP 250 PS 637: Performed by: INTERNAL MEDICINE

## 2024-04-15 RX ORDER — CEFTRIAXONE 2 G/1
2 INJECTION, POWDER, FOR SOLUTION INTRAMUSCULAR; INTRAVENOUS EVERY 24 HOURS
Status: DISCONTINUED | OUTPATIENT
Start: 2024-04-15 | End: 2024-04-17

## 2024-04-15 RX ADMIN — APIXABAN 2.5 MG: 2.5 TABLET, FILM COATED ORAL at 08:36

## 2024-04-15 RX ADMIN — METRONIDAZOLE 500 MG: 500 TABLET ORAL at 21:34

## 2024-04-15 RX ADMIN — DOXYCYCLINE HYCLATE 100 MG: 100 CAPSULE ORAL at 08:36

## 2024-04-15 RX ADMIN — APIXABAN 2.5 MG: 2.5 TABLET, FILM COATED ORAL at 20:03

## 2024-04-15 RX ADMIN — Medication 2.5 MG: at 17:28

## 2024-04-15 RX ADMIN — ACETAMINOPHEN 975 MG: 325 TABLET, FILM COATED ORAL at 20:03

## 2024-04-15 RX ADMIN — METRONIDAZOLE 500 MG: 500 TABLET ORAL at 14:30

## 2024-04-15 RX ADMIN — Medication 1 CAPSULE: at 08:36

## 2024-04-15 RX ADMIN — METOPROLOL SUCCINATE 25 MG: 25 TABLET, EXTENDED RELEASE ORAL at 08:35

## 2024-04-15 RX ADMIN — ACETAMINOPHEN 975 MG: 325 TABLET, FILM COATED ORAL at 11:11

## 2024-04-15 RX ADMIN — THERA TABS 1 TABLET: TAB at 08:36

## 2024-04-15 RX ADMIN — Medication 25 MCG: at 08:36

## 2024-04-15 RX ADMIN — METRONIDAZOLE 500 MG: 500 TABLET ORAL at 08:36

## 2024-04-15 RX ADMIN — CEFTRIAXONE SODIUM 2 G: 2 INJECTION, POWDER, FOR SOLUTION INTRAMUSCULAR; INTRAVENOUS at 17:05

## 2024-04-15 ASSESSMENT — ACTIVITIES OF DAILY LIVING (ADL)
ADLS_ACUITY_SCORE: 33

## 2024-04-15 NOTE — PROGRESS NOTES
[facility]    Medicine Progress Note - Hospitalist Service, GOLD TEAM 22    Date of Admission:  4/11/2024    Assessment & Plan   Hailey Alexis is a 89F w/ PMH history of right PICA and small scattered right PCA territory ischemic strokes (6/2020), atrial flutter, osteoporosis, macular degeneration admitted observation status on 4/11/2024 for a cat bite to the left hand.     Today's Plan:  - Given lack of response to doxycyline and flagyl, will expand to Ceftriaxone 2gm every 24 hours and flagyl ora.  - MRI hand ordered-- done on 4/14 but not yet read. Results pending   - monitor fever curve. Monitor WBC  - obtain MRSA swab    Cat bite to left hand (~4/8/24)  Patient was initially prescribed doxycycline and metronidazole for this outpatient but she was not able to  prior to coming in.  No associated numbness or sensorimotor concerns.  -Images taken per Ortho available in media and per their note  -Appreciate orthopedics input, performed I&D              - switched to po flagyl and doxycyline              - Follow-up to be determined  -Pain management: As needed Tylenol and oxycodone 2.5 mg every 4 hours as needed  - ID consulted, see recs  - hand and wrist MRI completed on 4/14-- results pending    Hx right PICA and small scattered right PCA territory ischemic strokes (6/2020)   MRI brain showed an acute right PICA and small scattered right PCA territory ischemic strokes.  Case was discussed with [facility] neurology. Given NIH stroke scale of 0 at the time of arrival to emergency department and timeframe near 4.5 hours at the time of decision making, decision was made not to treat with tPA or neuro interventional radiology.  No residual symptoms  -Continue DOAC     Atrial flutter  History of borderline right atrial pressure  Severely enlarged left atrium  Severe tricuspid regurgitation  Prior stroke was felt to be cardioembolic.. TTE 6/2020 with above findings.   "Follows with cardiology at [facility] and was last assessed per nursing 9/2023.  -Continue PTA Eliquis  -Continue PTA metoprolol with hold parameters  -hold digoxin- last fill 10/4/23 for 90 days, unclear if taking  - digoxin level      Osteoporosis-continue PTA Fosamax on discharge  HLD-continue statin        Diet:  regular  DVT Prophylaxis: DOAC  Snyder Catheter: Not present  Lines: None     Cardiac Monitoring: None  Code Status:  DNR/DNI          Diet: Regular Diet Adult    DVT Prophylaxis: DOAC  Snyder Catheter: Not present  Lines: None     Cardiac Monitoring: None  Code Status: Full Code      Clinically Significant Risk Factors                           # Overweight: Estimated body mass index is 25.86 kg/m  as calculated from the following:    Height as of this encounter: 1.6 m (5' 3\").    Weight as of this encounter: 66.2 kg (146 lb)., PRESENT ON ADMISSION            Disposition Plan     Medically Ready for Discharge: Anticipated Tomorrow             SHARLA OCHOA MD  Hospitalist Service, GOLD TEAM 22  [facility]  Securely message with [employer] (more info)  Text page via [employer] Paging/Directory   See signed in provider for up to date coverage information  ______________________________________________________________________    Interval History   Still having L sided hand swelling and redness. Improved since yesterday. Still having pain in the wrist and fingers but better than yesterday. No focal joint pain or swelling.    Physical Exam   Vital Signs: Temp: 98.5  F (36.9  C) Temp src: Oral BP: 124/60 Pulse: 75   Resp: 16 SpO2: 96 % O2 Device: None (Room air)    Weight: 146 lbs 0 oz    GENERAL: Alert and oriented x 3. NAD.  Able to sit upright unassisted. Cooperative.   HEENT: Anicteric sclera.  NC. AT.  Pupils equal and round  CV: No murmurs.  Irregularly irregular rate and rhythm  RESPIRATORY: Effort normal on RA. Lungs CTAB with no wheezing, rales, rhonchi.   GI: " Abdomen soft, NT, NABS  NEUROLOGICAL: No focal deficits. Moves all extremities.    EXTREMITIES: + Blanchable erythema and edema of all 5 digits in visible areas of the distal hand circumferentially-- seems to be improved slightly since yesterday, Ace wraps in place extending to the distal palmar crease that are CDI.  No other peripheral edema. Intact bilateral pedal pulses. Erythema and swelling improved. TTP improved in fingers.  SKIN: No jaundice. No other rashes.    Medical Decision Making       45 MINUTES SPENT BY ME on the date of service doing chart review, history, exam, documentation & further activities per the note.      Data         Imaging results reviewed over the past 24 hrs:   No results found for this or any previous visit (from the past 24 hour(s)).

## 2024-04-15 NOTE — PROGRESS NOTES
[facility] ID Service: Follow Up Note      Patient:  Hailey Alexis   Date of birth 2/10/1930, Medical record number [id number]  Date of Visit:  04/15/2024  Date of Admission: 4/11/2024         Assessment and Recommendations:   ID Problem List:  Infected cat bite, improvement in WBC on doxycycline and metronidazole but concern from primary team for ongoing swelling. Tetanus booster given 4/11/24.  Penicilln allergy - patient reports rash not requiring hospitalization about 20 years ago. Unclear how immediate the rash was. She doesn't remember many of the specifics but is quite sure she had no face/tongue swelling or difficulty breathing and that she did not require a hospital stay for it. Tolerated cefpodoxime challenge 4/14    Recommendations:  Stop doxycycline  Start ceftriaxone 2g IV q 24hrs  Continue metronidazole 500mg PO TID   Awaiting MRI read  Monitor fever curve and inflammatory markers    Discussion:  Hailey Alexis is a 89 yo woman with history of a cat bite to her left hand which developed swelling and pain about 48 hours after the bite. Due to her listed anaphylaxis to penicillin, she was started on an alternative regimen for cat bite of doxycycline and metronidazole. These have been given IV to date. This combo does give overall good bite coverage, though it is suboptimal for streptococci. MRI (4/15) pending.  Since she has had slow improvement on her current regimen and her penicillin allergy was not anaphylaxis (see problem list), test dose of cefpodoxime was given and well tolerated. Recommend stopping doxycycline and changing to ceftriaxone for improved strep and gram negative coverage given the slow response.       Recs were discussed with primary team today. Don't hesitate to call with questions.     Attestation:  I have reviewed today's vital signs, medications, labs and imaging.    Jennie Agosto PA-C  Pronouns: she/her/hers  Infectious Diseases  Contact via [employer] or  [facility] Paging/Directory        50 MINUTES SPENT BY ME on the date of service doing chart review, history, exam, documentation & further activities per the note.             Interval History:       Afebrile, VSS. Tolerated challenge dose of cefpodoxime without difficulty or adverse event.     Hand is less tender than it was on arrival. Able to passively extend fingers but has difficulty with actively extending fingers. +swelling over MTP joints. Redness is stable to improving. No fever, chills, nausea, vomiting, rashes, dyspnea, lip or facial swelling.          Current Antimicrobials   Current:  - docycycline 4/11- present  - metronidazole 4/11- present    Prior:  - cefpodoxime challenge dose - tolerated  - clindamycin 4/11  - TMP/SMX 4/11         Physical Exam:   Ranges for vital signs:  Temp:  [97.3  F (36.3  C)-98.5  F (36.9  C)] 97.3  F (36.3  C)  Pulse:  [72-80] 80  Resp:  [16] 16  BP: (124-149)/() 149/102  SpO2:  [96 %-98 %] 98 %    Intake/Output Summary (Last 24 hours) at 4/15/2024 0942  Last data filed at 4/15/2024 0859  Gross per 24 hour   Intake 480 ml   Output --   Net 480 ml     Exam:  GENERAL:  well-developed, well-nourished, sitting in chair in no acute distress.   ENT:  Head is normocephalic, atraumatic. Oropharynx is moist without exudates or ulcers.  EYES:  Eyes have anicteric sclerae, no conjunctival injection.    LUNGS:  Normal respiratory effort on RA.  SKIN:  No acute rashes.  +swelling over MTP joints of digits 2-5 with erythema on dorsum of hand.            Laboratory Data:     Inflammatory Markers    Recent Labs   Lab Test 04/11/24  1551   CRPI 117.49*       Hematology Studies    Recent Labs   Lab Test 04/13/24  0946 04/12/24  0710 04/11/24  1551   WBC 11.3* 14.7* 16.4*   HGB 13.5 13.3 15.3   MCV 94 93 93    212 262     No lab results found.    Metabolic Studies     Recent Labs   Lab Test 04/13/24  0946 04/12/24  0710 04/11/24  1551    137 139   POTASSIUM 4.6 4.3 4.5    CHLORIDE 106 104 102   CO2 25 26 27   BUN 24.2* 22.8 21.9   CR 0.77 0.84 0.83   GFRESTIMATED 71 64 65       Hepatic Studies    Recent Labs   Lab Test 04/11/24  1551   BILITOTAL 0.8   ALKPHOS 141   ALBUMIN 4.0   AST 30   ALT 17            Imaging:   MRI left wrist/hand 4/15/2024   Read PENDING    XR left hand 4/11/24  IMPRESSION: No definite radiopaque foreign body is identified. Soft tissue swelling dorsally. No acute fracture or malalignment. Severe STT and fourth DIP joint degenerative changes. Otherwise mild to moderate degenerative changes throughout the hand and   wrist. Osteopenia. Positive ulnar variance.

## 2024-04-15 NOTE — PLAN OF CARE
Goal Outcome Evaluation:    Care plan reviewed with: patient    Patient is A&O x4. Call light within reach. Able to make needs known effectively. SBA in the room with walker, voids spontaneously to the bathroom. Pt needs assistance for ordering meals, ate 50-75%, adequate fluid intake. Pt denied pain morning, later pain in hand 3/10, scheduled tylenol given and was effective. IV antibx ordered afternoon, current IV was dislodged, RN flyer called for IV placement, pt requested medication for pain 5/10 left hand, prn med effective         Problem: Adult Inpatient Plan of Care  Goal: Plan of Care Review  Description: The Plan of Care Review/Shift note should be completed every shift.  The Outcome Evaluation is a brief statement about your assessment that the patient is improving, declining, or no change.  This information will be displayed automatically on your shift  note.  Outcome: Progressing     Problem: Adult Inpatient Plan of Care  Goal: Optimal Comfort and Wellbeing  Outcome: Progressing     Problem: Adult Inpatient Plan of Care  Goal: Readiness for Transition of Care  Outcome: Progressing     Problem: Pain Acute  Goal: Optimal Pain Control and Function  Outcome: Progressing  Intervention: Prevent or Manage Pain  Recent Flowsheet Documentation  Taken 4/15/2024 0900 by Natividad Baker RN  Sensory Stimulation Regulation: television on  Intervention: Optimize Psychosocial Wellbeing  Recent Flowsheet Documentation  Taken 4/15/2024 0900 by Natividad Baker RN  Supportive Measures:   active listening utilized   positive reinforcement provided   verbalization of feelings encouraged     Problem: Pain Acute  Goal: Optimal Pain Control and Function  Intervention: Optimize Psychosocial Wellbeing  Recent Flowsheet Documentation  Taken 4/15/2024 0900 by Natividad Baker RN  Supportive Measures:   active listening utilized   positive reinforcement provided   verbalization of feelings encouraged

## 2024-04-15 NOTE — PLAN OF CARE
Goal Outcome Evaluation:    [id number]      Plan of Care Reviewed With: patient    Overall Patient Progress: improvingOverall Patient Progress: improving    Outcome Evaluation: No acute events on shift. Patient calm and cooperative on shift. Stable VS. No SOB, N/V or chest pain. Antibiotics switched from IV to oral. Pain very mild on shift and managed with tylenol.

## 2024-04-16 LAB
ANION GAP SERPL CALCULATED.3IONS-SCNC: 8 MMOL/L (ref 7–15)
BUN SERPL-MCNC: 20.4 MG/DL (ref 8–23)
CALCIUM SERPL-MCNC: 9.1 MG/DL (ref 8.2–9.6)
CHLORIDE SERPL-SCNC: 105 MMOL/L (ref 98–107)
CREAT SERPL-MCNC: 0.82 MG/DL (ref 0.51–0.95)
CRP SERPL-MCNC: 37.91 MG/L
DEPRECATED HCO3 PLAS-SCNC: 25 MMOL/L (ref 22–29)
EGFRCR SERPLBLD CKD-EPI 2021: 66 ML/MIN/1.73M2
ERYTHROCYTE [DISTWIDTH] IN BLOOD BY AUTOMATED COUNT: 14.6 % (ref 10–15)
GLUCOSE SERPL-MCNC: 88 MG/DL (ref 70–99)
HCT VFR BLD AUTO: 40.7 % (ref 35–47)
HGB BLD-MCNC: 13.9 G/DL (ref 11.7–15.7)
MCH RBC QN AUTO: 31.7 PG (ref 26.5–33)
MCHC RBC AUTO-ENTMCNC: 34.2 G/DL (ref 31.5–36.5)
MCV RBC AUTO: 93 FL (ref 78–100)
PLATELET # BLD AUTO: 279 10E3/UL (ref 150–450)
POTASSIUM SERPL-SCNC: 4.9 MMOL/L (ref 3.4–5.3)
RBC # BLD AUTO: 4.38 10E6/UL (ref 3.8–5.2)
SODIUM SERPL-SCNC: 138 MMOL/L (ref 135–145)
WBC # BLD AUTO: 9.3 10E3/UL (ref 4–11)

## 2024-04-16 PROCEDURE — 250N000013 HC RX MED GY IP 250 OP 250 PS 637: Performed by: INTERNAL MEDICINE

## 2024-04-16 PROCEDURE — 85027 COMPLETE CBC AUTOMATED: CPT | Performed by: STUDENT IN AN ORGANIZED HEALTH CARE EDUCATION/TRAINING PROGRAM

## 2024-04-16 PROCEDURE — 99233 SBSQ HOSP IP/OBS HIGH 50: CPT | Performed by: STUDENT IN AN ORGANIZED HEALTH CARE EDUCATION/TRAINING PROGRAM

## 2024-04-16 PROCEDURE — 84295 ASSAY OF SERUM SODIUM: CPT | Performed by: STUDENT IN AN ORGANIZED HEALTH CARE EDUCATION/TRAINING PROGRAM

## 2024-04-16 PROCEDURE — 36415 COLL VENOUS BLD VENIPUNCTURE: CPT | Performed by: STUDENT IN AN ORGANIZED HEALTH CARE EDUCATION/TRAINING PROGRAM

## 2024-04-16 PROCEDURE — 250N000013 HC RX MED GY IP 250 OP 250 PS 637: Performed by: STUDENT IN AN ORGANIZED HEALTH CARE EDUCATION/TRAINING PROGRAM

## 2024-04-16 PROCEDURE — 120N000002 HC R&B MED SURG/OB UMMC

## 2024-04-16 PROCEDURE — 86140 C-REACTIVE PROTEIN: CPT | Performed by: STUDENT IN AN ORGANIZED HEALTH CARE EDUCATION/TRAINING PROGRAM

## 2024-04-16 PROCEDURE — 250N000011 HC RX IP 250 OP 636: Performed by: STUDENT IN AN ORGANIZED HEALTH CARE EDUCATION/TRAINING PROGRAM

## 2024-04-16 PROCEDURE — 250N000013 HC RX MED GY IP 250 OP 250 PS 637: Performed by: PHYSICIAN ASSISTANT

## 2024-04-16 RX ADMIN — THERA TABS 1 TABLET: TAB at 10:00

## 2024-04-16 RX ADMIN — METOPROLOL SUCCINATE 25 MG: 25 TABLET, EXTENDED RELEASE ORAL at 10:02

## 2024-04-16 RX ADMIN — METRONIDAZOLE 500 MG: 500 TABLET ORAL at 06:09

## 2024-04-16 RX ADMIN — APIXABAN 2.5 MG: 2.5 TABLET, FILM COATED ORAL at 10:00

## 2024-04-16 RX ADMIN — METRONIDAZOLE 500 MG: 500 TABLET ORAL at 21:50

## 2024-04-16 RX ADMIN — Medication 25 MCG: at 10:02

## 2024-04-16 RX ADMIN — APIXABAN 2.5 MG: 2.5 TABLET, FILM COATED ORAL at 20:54

## 2024-04-16 RX ADMIN — METRONIDAZOLE 500 MG: 500 TABLET ORAL at 15:19

## 2024-04-16 RX ADMIN — ACETAMINOPHEN 975 MG: 325 TABLET, FILM COATED ORAL at 20:54

## 2024-04-16 RX ADMIN — CEFTRIAXONE SODIUM 2 G: 2 INJECTION, POWDER, FOR SOLUTION INTRAMUSCULAR; INTRAVENOUS at 15:19

## 2024-04-16 RX ADMIN — Medication 2.5 MG: at 12:49

## 2024-04-16 RX ADMIN — ACETAMINOPHEN 975 MG: 325 TABLET, FILM COATED ORAL at 11:25

## 2024-04-16 RX ADMIN — Medication 1 CAPSULE: at 10:01

## 2024-04-16 RX ADMIN — Medication 2.5 MG: at 18:23

## 2024-04-16 ASSESSMENT — ACTIVITIES OF DAILY LIVING (ADL)
ADLS_ACUITY_SCORE: 33
ADLS_ACUITY_SCORE: 34
ADLS_ACUITY_SCORE: 33
ADLS_ACUITY_SCORE: 34
ADLS_ACUITY_SCORE: 33
ADLS_ACUITY_SCORE: 34
ADLS_ACUITY_SCORE: 34
ADLS_ACUITY_SCORE: 33
ADLS_ACUITY_SCORE: 34
ADLS_ACUITY_SCORE: 33
ADLS_ACUITY_SCORE: 33
ADLS_ACUITY_SCORE: 34
ADLS_ACUITY_SCORE: 33
ADLS_ACUITY_SCORE: 34
ADLS_ACUITY_SCORE: 33

## 2024-04-16 NOTE — PLAN OF CARE
"  Problem: Adult Inpatient Plan of Care  Goal: Plan of Care Review  Description: The Plan of Care Review/Shift note should be completed every shift.  The Outcome Evaluation is a brief statement about your assessment that the patient is improving, declining, or no change.  This information will be displayed automatically on your shift  note.  Outcome: Progressing  Flowsheets (Taken 4/16/2024 1349)  Outcome Evaluation: Patient to return home when able.  Plan of Care Reviewed With: patient  Overall Patient Progress: no change     Problem: Adult Inpatient Plan of Care  Goal: Plan of Care Review  Description: The Plan of Care Review/Shift note should be completed every shift.  The Outcome Evaluation is a brief statement about your assessment that the patient is improving, declining, or no change.  This information will be displayed automatically on your shift  note.  Outcome: Progressing  Flowsheets (Taken 4/16/2024 1347)  Outcome Evaluation: Patient to return home when able.  Plan of Care Reviewed With: patient  Overall Patient Progress: no change  Goal: Patient-Specific Goal (Individualized)  Description: You can add care plan individualizations to a care plan. Examples of Individualization might be:  \"Parent requests to be called daily at 9am for status\", \"I have a hard time hearing out of my right ear\", or \"Do not touch me to wake me up as it startles  me\".  Outcome: Progressing  Goal: Absence of Hospital-Acquired Illness or Injury  Outcome: Progressing  Goal: Optimal Comfort and Wellbeing  Outcome: Progressing  Goal: Readiness for Transition of Care  Outcome: Progressing     Problem: Pain Acute  Goal: Optimal Pain Control and Function  Outcome: Progressing     "

## 2024-04-16 NOTE — PROGRESS NOTES
"[facility]    Medicine Progress Note - Hospitalist Service, GOLD TEAM 22    Date of Admission:  4/11/2024    Assessment & Plan   Hailey Alexis is a 89F w/ PMH history of right PICA and small scattered right PCA territory ischemic strokes (6/2020), atrial flutter, osteoporosis, macular degeneration admitted observation status on 4/11/2024 for a cat bite to the left hand.      Today's Plan:  - continue with Ceftriaxone 2gm every 24 hours and flagyl ora.  - MRI hand from 4/14 finalized 4/16  - No indication for OR per ortho due to clinical improvements   - monitor fever curve. Monitor WBC  - MRSA swab negative     Cat bite to left hand (~4/8/24)  Patient was initially prescribed doxycycline and metronidazole for this outpatient but she was not able to  prior to coming in.  No associated numbness or sensorimotor concerns.  -Images taken per Ortho available in media and per their note  -Appreciate orthopedics input, performed I&D              - switched to po flagyl and doxycyline              - Follow-up to be determined  - Pain management: As needed Tylenol and oxycodone 2.5 mg every 4 hours as needed  - ID consulted, see recs  - hand and wrist MRI completed on 4/14 finalized 4/16: \"There is a tiny underlying fluid collection consistent with abscess, and there is fluid and enhancement around the 4th dorsal compartment tendons at the level of the wrist and proximal metacarpals concerning for septic tenosynovitis. No evidence of septic joint or osteomyelitis.\"  - No indication for OR per ortho due to clinical improvements      Hx right PICA and small scattered right PCA territory ischemic strokes (6/2020)   MRI brain showed an acute right PICA and small scattered right PCA territory ischemic strokes.  Case was discussed with [facility] neurology. Given NIH stroke scale of 0 at the time of arrival to emergency department and timeframe near 4.5 hours at the time of " "decision making, decision was made not to treat with tPA or neuro interventional radiology.  No residual symptoms  -Continue DOAC     Atrial flutter  History of borderline right atrial pressure  Severely enlarged left atrium  Severe tricuspid regurgitation  Prior stroke was felt to be cardioembolic.. TTE 6/2020 with above findings.  Follows with cardiology at [facility] and was last assessed per nursing 9/2023.  -Continue PTA Eliquis  -Continue PTA metoprolol with hold parameters  -hold digoxin- last fill 10/4/23 for 90 days, unclear if taking  - digoxin level      Osteoporosis-continue PTA Fosamax on discharge  HLD-continue statin          Diet: Regular Diet Adult    DVT Prophylaxis: DOAC  Snyder Catheter: Not present  Lines: None     Cardiac Monitoring: None  Code Status: Full Code      Clinically Significant Risk Factors                         # Overweight: Estimated body mass index is 25.86 kg/m  as calculated from the following:    Height as of this encounter: 1.6 m (5' 3\").    Weight as of this encounter: 66.2 kg (146 lb)., PRESENT ON ADMISSION          Disposition Plan   Medically Ready for Discharge: Anticipated in 2-4 Days     Weston Hood MD  Hospitalist Service, GOLD TEAM [address] [place] [address]  Securely message with [employer] (more info)  Text page via [facility] Paging/Directory   See signed in provider for up to date coverage information  ______________________________________________________________________    Interval History   Nursing notes reviewed. No acute events overnight. Patient reports clinical improvement with her L hand.    Physical Exam   Vital Signs: Temp: 98.6  F (37  C) Temp src: Oral BP: (!) 150/92 (scheduled med given) Pulse: 75   Resp: 18 SpO2: 98 % O2 Device: None (Room air)    Weight: 146 lbs 0 oz    Constitutional: awake, alert, cooperative, no apparent distress, and appears stated age  Musculoskeletal: mild erythema of L hand that is blanchable; " swelling improved; passive ROM intact, active extension remains limited; pain limited strength   Neuropsychiatric: General: normal, calm, and normal eye contact  Level of consciousness: alert / normal  Affect: normal  Orientation: oriented to self, place, time and situation    Medical Decision Making   50 MINUTES SPENT BY ME on the date of service doing chart review, history, exam, documentation & further activities per the note.      Data     I have personally reviewed the following data over the past 24 hrs:    9.3  \   13.9   / 279     138 105 20.4 /  88   4.9 25 0.82 \     Procal: N/A CRP: 37.91 (H) Lactic Acid: N/A         Imaging results reviewed over the past 24 hrs:   No results found for this or any previous visit (from the past 24 hour(s)).

## 2024-04-16 NOTE — PROGRESS NOTES
[facility] ID Service: Follow Up Note      Patient:  Hailey Alexis   Date of birth 2/10/1930, Medical record number [id number]  Date of Visit:  04/16/2024  Date of Admission: 4/11/2024         Assessment and Recommendations:   ID Problem List:  Infected cat bite, improvement in WBC on doxycycline and metronidazole but concern from primary team for ongoing swelling. Tetanus booster given 4/11/24.  - MRI with tenosynovitis and small abscess  Penicilln allergy - patient reports rash not requiring hospitalization about 20 years ago. Unclear how immediate the rash was. She doesn't remember many of the specifics but is quite sure she had no face/tongue swelling or difficulty breathing and that she did not require a hospital stay for it. Tolerated cefpodoxime challenge 4/14    Recommendations:  Continue ceftriaxone 2g IV q 24hrs  Continue metronidazole 500mg PO TID   Re-engage with Ortho regarding MRI findings  Monitor fever curve and inflammatory markers    Discussion:  Hailey Alexis is a 89 yo woman with history of a cat bite to her left hand which developed swelling and pain about 48 hours after the bite. Due to her listed anaphylaxis to penicillin, she was started on an alternative regimen for cat bite of doxycycline and metronidazole. These have been given IV to date. This combo does give overall good bite coverage, though it is suboptimal for streptococci. Since she has had slow improvement on her current regimen and her penicillin allergy was not anaphylaxis (see problem list), test dose of cefpodoxime was given and well tolerated. Recommend stopping doxycycline and changing to ceftriaxone for improved strep and gram negative coverage given the slow response. MRI (4/15, read AM 4/16) with tiny abscess and fluid enhancement around 4th dorsal compartment tendons concerning for septic tenosynovitis.        Recs were discussed with primary team today. Don't hesitate to call with questions.      Attestation:  I have reviewed today's vital signs, medications, labs and imaging.    Jennie Agosto PA-C  Pronouns: she/her/hers  Infectious Diseases  Contact via [employer] or [employer] Paging/Directory        50 MINUTES SPENT BY ME on the date of service doing chart review, history, exam, documentation & further activities per the note.             Interval History:       Afebrile, VSS. Redness and swelling a little better this morning. Still unable to actively extend fingers. Tolerating ceftriaxone well. No fevers, chills, nausea, vomiting, diarrhea, rashes, dyspnea.         Current Antimicrobials   Current:  - ceftriaxone 4/15- present  - metronidazole 4/11- present    Prior:  - doxycycline 4/11- 4/14  - cefpodoxime challenge dose - tolerated  - clindamycin 4/11  - TMP/SMX 4/11         Physical Exam:   Ranges for vital signs:  Temp:  [97.4  F (36.3  C)-98.6  F (37  C)] 98.6  F (37  C)  Pulse:  [74-80] 75  Resp:  [16-18] 18  BP: (123-150)/(71-92) 150/92  SpO2:  [95 %-98 %] 98 %    Intake/Output Summary (Last 24 hours) at 4/15/2024 0942  Last data filed at 4/15/2024 0859  Gross per 24 hour   Intake 480 ml   Output --   Net 480 ml     Exam:  GENERAL:  well-developed, well-nourished, sitting in chair in no acute distress.   ENT:  Head is normocephalic, atraumatic. Oropharynx is moist without exudates or ulcers.  EYES:  Eyes have anicteric sclerae, no conjunctival injection.    LUNGS:  Normal respiratory effort on RA.  MUSCULOSKELETAL: +active flexion of fingers of left hand, unable to actively extend.   SKIN:  No acute rashes.  +swelling over MTP joints of digits 2-5, swelling is decreasing over 4th and 5th digits. Improving erythema on dorsum of hand.            Laboratory Data:     Inflammatory Markers    Recent Labs   Lab Test 04/16/24  0656 04/15/24  1615 04/11/24  1551   CRPI 37.91* 50.14* 117.49*       Hematology Studies    Recent Labs   Lab Test 04/16/24  0656 04/15/24  1615 04/13/24  0946 04/12/24  0710   WBC  9.3 9.1 11.3* 14.7*   HGB 13.9 13.6 13.5 13.3   MCV 93 91 94 93    275 231 212     No lab results found.    Metabolic Studies     Recent Labs   Lab Test 04/16/24  0656 04/13/24  0946 04/12/24  0710 04/11/24  1551    138 137 139   POTASSIUM 4.9 4.6 4.3 4.5   CHLORIDE 105 106 104 102   CO2 25 25 26 27   BUN 20.4 24.2* 22.8 21.9   CR 0.82 0.77 0.84 0.83   GFRESTIMATED 66 71 64 65       Hepatic Studies    Recent Labs   Lab Test 04/11/24  1551   BILITOTAL 0.8   ALKPHOS 141   ALBUMIN 4.0   AST 30   ALT 17            Imaging:   MRI left wrist/hand 4/15/2024   IMPRESSION:  1.  There is an open wound at the dorsal aspect of the wrist with adjacent cellulitis. There is a tiny underlying fluid collection consistent with abscess, and there is fluid and enhancement around the 4th dorsal compartment tendons at the level of the   wrist and proximal metacarpals concerning for septic tenosynovitis. No evidence of septic joint or osteomyelitis.  2.  Multifocal osteoarthrosis.  3.  Full-thickness tear of the TFCC. Positive ulnar variance.    XR left hand 4/11/24  IMPRESSION: No definite radiopaque foreign body is identified. Soft tissue swelling dorsally. No acute fracture or malalignment. Severe STT and fourth DIP joint degenerative changes. Otherwise mild to moderate degenerative changes throughout the hand and   wrist. Osteopenia. Positive ulnar variance.

## 2024-04-16 NOTE — PLAN OF CARE
Goal Outcome Evaluation:    Care plan reviewed with: patient    Patient is A&O x4. Call light within reach. Able to make needs known effectively. Ast x1 in the room with walker, voids spontaneously to the bathroom. Pt needs assistance for ordering meals, ate 75 -100%, adequate fluid intake. Pt denied pain morning, later pain in hand 5/10, scheduled tylenol given and was effective. IV antibx order afternoon, current IV clotted, RN flyer called for IV placement, IV continues to run TKO 10 ml / hr NS. Pt requested medication x2 for pain 5/10 left hand, prn med effective with pain 2/10. Pts son's visited today.     Problem: Adult Inpatient Plan of Care  Goal: Plan of Care Review  Description: The Plan of Care Review/Shift note should be completed every shift.  The Outcome Evaluation is a brief statement about your assessment that the patient is improving, declining, or no change.  This information will be displayed automatically on your shift  note.  Outcome: Progressing     Problem: Adult Inpatient Plan of Care  Goal: Absence of Hospital-Acquired Illness or Injury  Intervention: Prevent Skin Injury  Recent Flowsheet Documentation  Taken 4/16/2024 1600 by Natividad Baker RN  Body Position: position changed independently     Problem: Adult Inpatient Plan of Care  Goal: Optimal Comfort and Wellbeing  Outcome: Progressing     Problem: Pain Acute  Goal: Optimal Pain Control and Function  Outcome: Progressing  Intervention: Prevent or Manage Pain  Recent Flowsheet Documentation  Taken 4/16/2024 1600 by Natividad Baker RN  Medication Review/Management: medications reviewed  Taken 4/16/2024 1351 by Natividad Baker RN  Sensory Stimulation Regulation: television on  Intervention: Optimize Psychosocial Wellbeing  Recent Flowsheet Documentation  Taken 4/16/2024 1351 by Natividad Baker RN  Supportive Measures:   active listening utilized   verbalization of feelings encouraged     Problem: Pain Acute  Goal:  Optimal Pain Control and Function  Intervention: Optimize Psychosocial Wellbeing  Recent Flowsheet Documentation  Taken 4/16/2024 1351 by Natividad Baker, RN  Supportive Measures:   active listening utilized   verbalization of feelings encouraged     Problem: Fall Injury Risk  Goal: Absence of Fall and Fall-Related Injury  Intervention: Identify and Manage Contributors  Recent Flowsheet Documentation  Taken 4/16/2024 1600 by Natividad Baker, RN  Medication Review/Management: medications reviewed  Intervention: Promote Injury-Free Environment  Recent Flowsheet Documentation  Taken 4/16/2024 1600 by Natividad Baker, RN  Safety Promotion/Fall Prevention:   activity supervised   clutter free environment maintained   mobility aid in reach   safety round/check completed   room near nurse's station   Goal Outcome Evaluation:

## 2024-04-16 NOTE — CONSULTS
Care Management Initial Consult    General Information  Assessment completed with: Patient,    Type of CM/SW Visit: Initial Assessment    Primary Care Provider verified and updated as needed: Yes   Readmission within the last 30 days: no previous admission in last 30 days      Reason for Consult: discharge planning  Advance Care Planning: Advance Care Planning Reviewed: no concerns identified        Communication Assessment  Patient's communication style: spoken language (English or Bilingual)    Hearing Difficulty or Deaf: yes   Wear Glasses or Blind: no    Cognitive  Cognitive/Neuro/Behavioral: WDL                      Living Environment:   People in home: grandchild(nereida)     Current living Arrangements: condominium      Able to return to prior arrangements: yes     Family/Social Support:  Care provided by: self, child(nereida), other (see comments) (Grandchildren)  Provides care for: no one  Marital Status:   Children, Other (specify), Neighbor, Significant Other (Grandchildren)          Description of Support System: Supportive, Involved    Support Assessment: Adequate family and caregiver support, Adequate social supports    Current Resources:   Patient receiving home care services: No     Community Resources: Housekeeping/Chore Agency  Equipment currently used at home: walker, rolling  Supplies currently used at home: None    Employment/Financial:  Employment Status: retired        Financial Concerns: none   Referral to Financial Worker: No     Does the patient's insurance plan have a 3 day qualifying hospital stay waiver?  No    Lifestyle & Psychosocial Needs:  Social Determinants of Health     Food Insecurity: Not on File (2022)    Received from [facility]     Food Insecurity     Food: 0   Depression: Not at risk (2024)    PHQ-2     PHQ-2 Score: 0   Housing Stability: Not on File (2022)    Received from [facility]     Housing Stability     Housin   Tobacco Use: Medium Risk  "(4/11/2024)    Patient History     Smoking Tobacco Use: Former     Smokeless Tobacco Use: Never     Passive Exposure: Not on file   Financial Resource Strain: Not on File (4/6/2022)    Received from [facility]     Financial Resource Strain     Financial Resource Strain: 0   Alcohol Use: Not on file   Transportation Needs: Not on File (4/6/2022)    Received from [facility]     Transportation Needs     Transportation: 0   Physical Activity: Not on File (4/6/2022)    Received from [facility]     Physical Activity     Physical Activity: 0   Interpersonal Safety: Not on file   Stress: Not on File (4/6/2022)    Received from [facility]     Stress     Stress: 0   Social Connections: Not on File (4/6/2022)    Received from [facility]     Social Connections     Social Connections and Isolation: 0   Health Literacy: Not on file     Functional Status:  Prior to admission patient needed assistance:   Dependent ADLs:: Ambulation-walker  Dependent IADLs:: Transportation, Money Management, Cleaning, Laundry, Meal Preparation, Cooking  Assesssment of Functional Status: At functional baseline    Mental Health Status:  Mental Health Status: No Current Concerns       Chemical Dependency Status:  Chemical Dependency Status: No Current Concerns           Values/Beliefs:  Spiritual, Cultural Beliefs, [employer] Practices, Values that affect care: no             Additional Information:  Per H&P, patient is a 89 year old Female w/ PMH history of right PICA and small scattered right PCA territory ischemic strokes (6/2020), atrial flutter, osteoporosis, macular degeneration admitted observation status on 4/11/2024 for a cat bite to the left hand.     Writer completed initial assessment due to discharge planning. Writer introduced self, role and duties to patient. Patient resides in [facility] with grandson. She receives assistance from grandson with dinner, from son with transportation and finances and from "cleaning " "lady\" with laundry and housework. She uses a four wheeled walker. Patient denies any financial, mental health or chemical dependency concerns.     Her primary care physician on file was confirmed. Patient reports she does not have a healthcare directive and is not interested in completing one at this time. Patient's discharge goal is to return home when able. She denies any needs at home. Patient reports that her son will be able to provide transportation at discharge. /RNCC to continue to follow for support and discharge planning needs that arise.    SELENA Burris, MSW  6th Floor Medical Surgical Unit  Phone [phone]      "

## 2024-04-16 NOTE — PROGRESS NOTES
Orthopaedic Surgery Progress Note 04/16/2024    Subjective    Pain well controlled overall. Denies new numbness, tingling, or weakness. Denies chest pain, SOB, chills, fevers.    Patient receiving Ceftriaxone and Flagyl.     She reports that she feels she is improving. She reports that pain is better and her range of motion is improving. She demonstrated ability to grab and carry hand lotion bottle with the L hand. She demonstrated grabbing cup with L hand and lifting it, with some support from the R hand.    Objective  Temp: 98.6  F (37  C) Temp src: Oral BP: (!) 150/92 (scheduled med given) Pulse: 75   Resp: 18 SpO2: 98 % O2 Device: None (Room air)      Exam:  Gen: No acute distress, resting comfortably in bed.  Resp: Non-labored breathing  Cardio: Regular rate via peripheral pulse    MSK:    LUE:  - Dressings were c/d/I - removed to analyze wound  - Patient shows reduced swelling and erythema from initial encounter, continues to improve  - Unable to express any drainage or pus from small dorsal hand incision  - Improved passive ROM of the wrist joint upon flexion and extension; actively flex wrist about 40 degrees and extend about 40 degrees as well  - Improved passive ROM of the digits upon flexion and extension at the MCP, PIP, DIP joints; she is able to make partial fist, with about 40 degrees at MCP, 75 degrees at IP joints of each digit  - Fires EPL, FPL, Intrinsics  - SILT medial/radial/ulnar/axillary nerves  - Hand wwp    At the conclusion of exam, steri strip used to loosely approximate edges of wound. Adaptic placed. 2 x 2 gauze padded over adaptic. Loose coban used to re-dress wound.    Recent Labs   Lab 04/16/24  0656 04/15/24  1615 04/13/24  0946   WBC 9.3 9.1 11.3*   HGB 13.9 13.6 13.5    275 231     .  CRP Inflammation   Date Value Ref Range Status   04/16/2024 37.91 (H) <5.00 mg/L Final     Imaging - MRI from 4/14: able to appreciate dorsal incision overlying mid-dorsal area just distal to  "wrist joint; unable to appreciate fluid collection.    Assessment:     89-year-old female 2 days after a cat bite to the left dorsal aspect of the hand who appears to have an exacerbated infectious response consistent with cellulitis. Patient continues to improve both clinically and laboratory markers wise as well.    Primary team to continue to trend inflammatory markers and to decide on antibiotic plan and duration. Recommend physical therapy for hand function and mobility. I counseled patient on using the L hand as much as possible, and to practice holding lotion bottle and water cut as much as possible to prevent further stiffness.    MRI findings not contributory to patient medical course. The \"area of absent enhancement\" is close to the dorsal hand incision and an area we spread during our bedside procedure, so this area of absent enhancement is entirely expected.     Plan:  - Plan for OR: Not at this time  - Anticoagulation/DVT Prophylaxis: Per primary team  - Antibiotics/Tetanus: Per primary team, recommend broad coverage including Pasteurella species which are common in cat bite wounds  - X-rays/Imaging: No further imaging recommended  - Activity: Up ad justus.  - Weight bearing: Weightbearing as tolerated left upper extremity  - Pain control: Per primary  - Diet: No restrictions or orthopedics  - Follow-up: Referral to hand specialist to evaluate the hand - ortho has messaged schedulers for 2 week follow up  - Disposition: Primary team to discharge    Chris Dumont MD  Resident Physician PGY-1  Orthopaedic Surgery  [phone]    "

## 2024-04-17 ENCOUNTER — APPOINTMENT (OUTPATIENT)
Dept: OCCUPATIONAL THERAPY | Facility: CLINIC | Age: 89
DRG: 603 | End: 2024-04-17
Attending: STUDENT IN AN ORGANIZED HEALTH CARE EDUCATION/TRAINING PROGRAM
Payer: COMMERCIAL

## 2024-04-17 LAB
CRP SERPL-MCNC: 25.4 MG/L
ERYTHROCYTE [DISTWIDTH] IN BLOOD BY AUTOMATED COUNT: 14.6 % (ref 10–15)
HCT VFR BLD AUTO: 40.5 % (ref 35–47)
HGB BLD-MCNC: 13.7 G/DL (ref 11.7–15.7)
MCH RBC QN AUTO: 31.1 PG (ref 26.5–33)
MCHC RBC AUTO-ENTMCNC: 33.8 G/DL (ref 31.5–36.5)
MCV RBC AUTO: 92 FL (ref 78–100)
PLATELET # BLD AUTO: 300 10E3/UL (ref 150–450)
RBC # BLD AUTO: 4.41 10E6/UL (ref 3.8–5.2)
WBC # BLD AUTO: 8.2 10E3/UL (ref 4–11)

## 2024-04-17 PROCEDURE — 97535 SELF CARE MNGMENT TRAINING: CPT | Mod: GO

## 2024-04-17 PROCEDURE — 250N000013 HC RX MED GY IP 250 OP 250 PS 637: Performed by: PHYSICIAN ASSISTANT

## 2024-04-17 PROCEDURE — 99233 SBSQ HOSP IP/OBS HIGH 50: CPT | Performed by: PHYSICIAN ASSISTANT

## 2024-04-17 PROCEDURE — 250N000013 HC RX MED GY IP 250 OP 250 PS 637: Performed by: STUDENT IN AN ORGANIZED HEALTH CARE EDUCATION/TRAINING PROGRAM

## 2024-04-17 PROCEDURE — 999N000147 HC STATISTIC PT IP EVAL DEFER

## 2024-04-17 PROCEDURE — 120N000002 HC R&B MED SURG/OB UMMC

## 2024-04-17 PROCEDURE — 99232 SBSQ HOSP IP/OBS MODERATE 35: CPT | Performed by: STUDENT IN AN ORGANIZED HEALTH CARE EDUCATION/TRAINING PROGRAM

## 2024-04-17 PROCEDURE — 86140 C-REACTIVE PROTEIN: CPT | Performed by: STUDENT IN AN ORGANIZED HEALTH CARE EDUCATION/TRAINING PROGRAM

## 2024-04-17 PROCEDURE — 85027 COMPLETE CBC AUTOMATED: CPT | Performed by: STUDENT IN AN ORGANIZED HEALTH CARE EDUCATION/TRAINING PROGRAM

## 2024-04-17 PROCEDURE — 250N000013 HC RX MED GY IP 250 OP 250 PS 637: Performed by: INTERNAL MEDICINE

## 2024-04-17 PROCEDURE — 36415 COLL VENOUS BLD VENIPUNCTURE: CPT | Performed by: STUDENT IN AN ORGANIZED HEALTH CARE EDUCATION/TRAINING PROGRAM

## 2024-04-17 PROCEDURE — 97165 OT EVAL LOW COMPLEX 30 MIN: CPT | Mod: GO

## 2024-04-17 RX ORDER — CEFPODOXIME PROXETIL 200 MG/1
400 TABLET, FILM COATED ORAL 2 TIMES DAILY
Qty: 60 TABLET | Refills: 0 | Status: DISCONTINUED | OUTPATIENT
Start: 2024-04-17 | End: 2024-04-18 | Stop reason: HOSPADM

## 2024-04-17 RX ORDER — METRONIDAZOLE 500 MG/1
500 TABLET ORAL 2 TIMES DAILY
Qty: 29 TABLET | Refills: 0 | Status: DISCONTINUED | OUTPATIENT
Start: 2024-04-17 | End: 2024-04-18 | Stop reason: HOSPADM

## 2024-04-17 RX ORDER — METRONIDAZOLE 500 MG/1
500 TABLET ORAL 2 TIMES DAILY
Status: DISCONTINUED | OUTPATIENT
Start: 2024-04-17 | End: 2024-04-17

## 2024-04-17 RX ORDER — CEFPODOXIME PROXETIL 200 MG/1
400 TABLET, FILM COATED ORAL 2 TIMES DAILY
Status: DISCONTINUED | OUTPATIENT
Start: 2024-04-17 | End: 2024-04-17

## 2024-04-17 RX ADMIN — ACETAMINOPHEN 975 MG: 325 TABLET, FILM COATED ORAL at 11:14

## 2024-04-17 RX ADMIN — METRONIDAZOLE 500 MG: 500 TABLET ORAL at 06:26

## 2024-04-17 RX ADMIN — Medication 1 CAPSULE: at 07:40

## 2024-04-17 RX ADMIN — ACETAMINOPHEN 975 MG: 325 TABLET, FILM COATED ORAL at 03:07

## 2024-04-17 RX ADMIN — THERA TABS 1 TABLET: TAB at 07:41

## 2024-04-17 RX ADMIN — Medication 2.5 MG: at 15:46

## 2024-04-17 RX ADMIN — METRONIDAZOLE 500 MG: 500 TABLET ORAL at 19:19

## 2024-04-17 RX ADMIN — Medication 2.5 MG: at 20:02

## 2024-04-17 RX ADMIN — CEFPODOXIME PROXETIL 400 MG: 200 TABLET, FILM COATED ORAL at 15:46

## 2024-04-17 RX ADMIN — ACETAMINOPHEN 975 MG: 325 TABLET, FILM COATED ORAL at 19:19

## 2024-04-17 RX ADMIN — APIXABAN 2.5 MG: 2.5 TABLET, FILM COATED ORAL at 07:40

## 2024-04-17 RX ADMIN — APIXABAN 2.5 MG: 2.5 TABLET, FILM COATED ORAL at 19:19

## 2024-04-17 RX ADMIN — METOPROLOL SUCCINATE 25 MG: 25 TABLET, EXTENDED RELEASE ORAL at 07:40

## 2024-04-17 RX ADMIN — CEFPODOXIME PROXETIL 400 MG: 200 TABLET, FILM COATED ORAL at 19:19

## 2024-04-17 RX ADMIN — Medication 25 MCG: at 07:40

## 2024-04-17 ASSESSMENT — ACTIVITIES OF DAILY LIVING (ADL)
ADLS_ACUITY_SCORE: 34

## 2024-04-17 NOTE — PROGRESS NOTES
"   04/17/24 0945   Appointment Info   Signing Clinician's Name / Credentials (OT) Jennie Steven MS, OTR/L, CLT   Rehab Comments (OT) WBAT L UE   Living Environment   People in Home grandchild(nereida)   Current Living Arrangements condominium   Home Accessibility no concerns   Transportation Anticipated family or friend will provide   Living Environment Comments Pt lives in a condo which has elevator access and no need for stairs. Pt 20 year old grandson lives with her, not home all the time but helps as needed. Pt has WIS with shower chair, she thinks there are grab bars in the bathroom.   Self-Care   Usual Activity Tolerance moderate   Current Activity Tolerance moderate   Equipment Currently Used at Home walker, rolling;shower chair   Fall history within last six months no   Activity/Exercise/Self-Care Comment Pt uses a 4WW at baseline and is mod I with basic ADL and mobility.   Instrumental Activities of Daily Living (IADL)   IADL Comments Grandson assist with medication management and cooking. Sons assist with transportation and finances. Pt has a cleaning lady for housekeeping and laundry.   General Information   Onset of Illness/Injury or Date of Surgery 04/11/24  (OT consult 4/17)   Referring Physician Weston Hood MD   Patient/Family Therapy Goal Statement (OT) \"I want to go home\"   Additional Occupational Profile Info/Pertinent History of Current Problem Per chart: \"89-year-old female 2 days after a cat bite to the left dorsal aspect of the hand who appears to have an exacerbated infectious response consistent with cellulitis. Patient continues to improve both clinically and laboratory markers wise as well.\"   Left Upper Extremity (Weight-bearing Status) weight-bearing as tolerated (WBAT)   General Observations and Info Activity: ambulate with assist 4x/daily   Cognitive Status Examination   Orientation Status orientation to person, place and time   Cognitive Status Comments Pt repeats information " at times, seems somewhat [place] which could be factor   Pain Assessment   Patient Currently in Pain Yes, see Vital Sign flowsheet   Range of Motion Comprehensive   Comment, General Range of Motion impaired in L wrist and fingers from swelling and pain   Strength Comprehensive (MMT)   Comment, General Manual Muscle Testing (MMT) Assessment generalized deconditioning   Coordination   Functional Limitations Fine motor ADL performance impaired;Impaired ability to perform bilateral tasks   Bed Mobility   Comment (Bed Mobility) n/a this date   Transfers   Transfer Comments SBA 4WW   Balance   Balance Comments SBA 4ww   Activities of Daily Living   BADL Assessment/Intervention lower body dressing;toileting   Lower Body Dressing Assessment/Training   [place] Level (Lower Body Dressing) minimum assist (75% patient effort)   Toileting   [place] Level (Toileting) contact guard assist   Clinical Impression   Criteria for Skilled Therapeutic Interventions Met (OT) Yes, treatment indicated   OT Diagnosis decreased I with ADL   OT Problem List-Impairments impacting ADL activity tolerance impaired;coordination;pain   Assessment of Occupational Performance 1-3 Performance Deficits   Identified Performance Deficits [facility]   Planned Therapy Interventions (OT) ADL retraining;ROM;fine motor coordination training;transfer training   Clinical Decision Making Complexity (OT) problem focused assessment/low complexity   Risk & Benefits of therapy have been explained evaluation/treatment results reviewed;care plan/treatment goals reviewed;risks/benefits reviewed;current/potential barriers reviewed;participants voiced agreement with care plan;participants included;patient   OT Total Evaluation Time   OT Eval, Low Complexity Minutes (49743) 10   OT Goals   Therapy Frequency (OT) 3 times/week   OT Predicted Duration/Target Date for Goal Attainment 04/24/24   OT Goals Lower Body Dressing;Toilet Transfer/Toileting   OT: Lower Body Dressing  Supervision/stand-by assist   OT: Toilet Transfer/Toileting Modified independent   OT Discharge Planning   OT Plan check bathroom transfers, OOR as able, FMC/ROM L UE as tolerated/needed   OT Discharge Recommendation (DC Rec) home with assist;home with outpatient occupational therapy   OT Rationale for DC Rec Pt appears near baseline, limited by L hand pain and coordination deficits. Recommend f/u with OP hand therapy. Recommend pt continue to have assist for IADL, may also need A prn for ADL and recommend supervision with shower transfers.   OT Brief overview of current status SBA 4WW; ambulate 4x/day   Total Session Time   Timed Code Treatment Minutes 15   Total Session Time (sum of timed and untimed services) 25

## 2024-04-17 NOTE — PLAN OF CARE
Physical Therapy: Orders received. Chart reviewed and discussed with care team.? Physical Therapy not indicated due to patient presenting close to baseline level of function. Of note uses a 4WW and lives in a condo with no stairs. Patient lives with grandson who can assist as needed.? Defer discharge recommendations to Occupational Therapy (OP hand therapy).? Will complete orders.

## 2024-04-17 NOTE — PROGRESS NOTES
[facility] ID Service: Follow Up Note      Patient:  Hailey Alexis   Date of birth 2/10/1930, Medical record number [id number]  Date of Visit:  04/17/2024  Date of Admission: 4/11/2024         Assessment and Recommendations:   ID Problem List:  Infected cat bite c/b septic tenosynovitis of 4th extensor  - Tetanus booster given 4/11/24.  - MRI with tenosynovitis and small abscess  - Per ortho, no plans for surgical intervention  Penicilln allergy - patient reports rash not requiring hospitalization about 20 years ago. Unclear how immediate the rash was. She doesn't remember many of the specifics but is quite sure she had no face/tongue swelling or difficulty breathing and that she did not require a hospital stay for it. Tolerated cefpodoxime challenge 4/14    Recommendations:  Can transition from ceftriaxone to cefpodoxime 400mg PO q12hrs  - alternative is cefdinir 300mg PO q12hrs if issues with formulary or insurance coverage, however cefpodoxime is preferred  Continue metronidazole 500mg PO- OK to decrease to q12hr dosing   Will need to stay on abx until ID follow up - anticipating she will need a total of 2-3 weeks, today is day #6.5 of abx  Will need close follow up - requesting ID appointment  Monitor fever curve and inflammatory markers    Discussion:  Hailey Alexis is a 89 yo woman with history of a cat bite to her left hand which developed swelling and pain about 48 hours after the bite. Due to her listed anaphylaxis to penicillin, she was started on an alternative regimen for cat bite of doxycycline and metronidazole. These have been given IV to date. This combo does give overall good bite coverage, though it is suboptimal for streptococci. Since she has had slow improvement and her penicillin allergy was not anaphylaxis (see problem list), test dose of cefpodoxime was given and well tolerated. Transitioned from doxycycline to ceftriaxone on 4/15 for improved strep and gram negative  coverage given the slow response.     MRI (4/15, read AM 4/16) with tiny abscess and fluid enhancement around 4th dorsal compartment tendons concerning for septic tenosynovitis.  Ortho evaluated and recommended antibiotics alone without surgical intervention. Redness and swelling continuing to improve, still with limited extension of digits 3-5. Suspect strep involvement given more rapid improvement after change to ceftriaxone. Can transition to PO therapy today. Anticipate she will need a longer course for tenosynovitis without surgical intervention.       Recs were discussed with primary team today. Don't hesitate to call with questions.     Attestation:  I have reviewed today's vital signs, medications, labs and imaging.    Jennie Agosto PA-C  Pronouns: she/her/hers  Infectious Diseases  Contact via [employer] or [facility] Paging/Directory        50 MINUTES SPENT BY ME on the date of service doing chart review, history, exam, documentation & further activities per the note.             Interval History:       Afebrile, VSS. Redness resolving. Has ongoing swelling over MCP joints 2 and 3. Able to extend digits 1, 2 well, small amount of movement in 3 and 5 and unable to extend 4th digit. Able to flex fingers and  items. Describes throbbing pain in hand at rest rated 5/10. No significant drainage from wound. No fevers, chills, nausea, vomiting, diarrhea, dyspnea, rash.         Current Antimicrobials   Current:  - ceftriaxone 4/15- present  - metronidazole 4/11- present    Prior:  - doxycycline 4/11- 4/14  - cefpodoxime challenge dose - tolerated  - clindamycin 4/11  - TMP/SMX 4/11         Physical Exam:   Ranges for vital signs:  Temp:  [97.3  F (36.3  C)-98.9  F (37.2  C)] 97.5  F (36.4  C)  Pulse:  [70-83] 71  Resp:  [18] 18  BP: (123-153)/(76-90) 153/82  SpO2:  [92 %-97 %] 97 %    Intake/Output Summary (Last 24 hours) at 4/15/2024 0942  Last data filed at 4/15/2024 0859  Gross per 24 hour   Intake 480 ml   Output  --   Net 480 ml     Exam:  GENERAL:  well-developed, well-nourished, sitting in chair in no acute distress.   ENT:  Head is normocephalic, atraumatic. Oropharynx is moist without exudates or ulcers.  EYES:  Eyes have anicteric sclerae, no conjunctival injection.    LUNGS:  Normal respiratory effort on RA.  MUSCULOSKELETAL: +active flexion of fingers of left hand. +extension of digits 1, 2, and small amount of extension at 3 and 5, unable to actively extend 4th digit.   SKIN:  No acute rashes.  +swelling over MTP joints of digits 2-3, swelling is decreasing overall. Resolving erythema on dorsum of hand without significant drainage from wound.           Laboratory Data:     Inflammatory Markers    Recent Labs   Lab Test 04/17/24  0723 04/16/24  0656 04/15/24  1615 04/11/24  1551   CRPI 25.40* 37.91* 50.14* 117.49*       Hematology Studies    Recent Labs   Lab Test 04/17/24  0723 04/16/24  0656 04/15/24  1615 04/13/24  0946   WBC 8.2 9.3 9.1 11.3*   HGB 13.7 13.9 13.6 13.5   MCV 92 93 91 94    279 275 231     No lab results found.    Metabolic Studies     Recent Labs   Lab Test 04/16/24  0656 04/13/24  0946 04/12/24  0710 04/11/24  1551    138 137 139   POTASSIUM 4.9 4.6 4.3 4.5   CHLORIDE 105 106 104 102   CO2 25 25 26 27   BUN 20.4 24.2* 22.8 21.9   CR 0.82 0.77 0.84 0.83   GFRESTIMATED 66 71 64 65       Hepatic Studies    Recent Labs   Lab Test 04/11/24  1551   BILITOTAL 0.8   ALKPHOS 141   ALBUMIN 4.0   AST 30   ALT 17            Imaging:   MRI left wrist/hand 4/15/2024   IMPRESSION:  1.  There is an open wound at the dorsal aspect of the wrist with adjacent cellulitis. There is a tiny underlying fluid collection consistent with abscess, and there is fluid and enhancement around the 4th dorsal compartment tendons at the level of the   wrist and proximal metacarpals concerning for septic tenosynovitis. No evidence of septic joint or osteomyelitis.  2.  Multifocal osteoarthrosis.  3.  Full-thickness tear  of the TFCC. Positive ulnar variance.    XR left hand 4/11/24  IMPRESSION: No definite radiopaque foreign body is identified. Soft tissue swelling dorsally. No acute fracture or malalignment. Severe STT and fourth DIP joint degenerative changes. Otherwise mild to moderate degenerative changes throughout the hand and   wrist. Osteopenia. Positive ulnar variance.

## 2024-04-17 NOTE — PLAN OF CARE
Problem: Adult Inpatient Plan of Care  Goal: Plan of Care Review  Description: The Plan of Care Review/Shift note should be completed every shift.  The Outcome Evaluation is a brief statement about your assessment that the patient is improving, declining, or no change.  This information will be displayed automatically on your shift  note.  Outcome: Progressing  Flowsheets (Taken 4/17/2024 0743)  Plan of Care Reviewed With: patient  Overall Patient Progress: no change  Goal: Absence of Hospital-Acquired Illness or Injury  Outcome: Progressing  Intervention: Identify and Manage Fall Risk  Recent Flowsheet Documentation  Taken 4/17/2024 0200 by Makenzie Cheng RN  Safety Promotion/Fall Prevention:   activity supervised   clutter free environment maintained   mobility aid in reach   safety round/check completed   room near nurse's station  Intervention: Prevent Skin Injury  Recent Flowsheet Documentation  Taken 4/17/2024 0200 by Makenzie Cheng RN  Body Position:   position changed independently   legs elevated  Intervention: Prevent and Manage VTE (Venous Thromboembolism) Risk  Recent Flowsheet Documentation  Taken 4/17/2024 0200 by Makenzie Cheng RN  VTE Prevention/Management: SCDs (sequential compression devices) off  Goal: Optimal Comfort and Wellbeing  Outcome: Progressing  Intervention: Monitor Pain and Promote Comfort  Recent Flowsheet Documentation  Taken 4/17/2024 0200 by Makenzie Cheng RN  Pain Management Interventions: medication (see MAR)  Goal: Readiness for Transition of Care  Outcome: Progressing  Flowsheets (Taken 4/17/2024 0743)  Anticipated Changes Related to Illness: none  Transportation Anticipated: family or friend will provide  Concerns to be Addressed: discharge planning  Intervention: Mutually Develop Transition Plan  Recent Flowsheet Documentation  Taken 4/17/2024 0743 by Makenzie Cheng RN  Anticipated Changes Related to Illness: none  Transportation Anticipated: family  or friend will provide  Concerns to be Addressed: discharge planning     Problem: Pain Acute  Goal: Optimal Pain Control and Function  Outcome: Progressing  Intervention: Develop Pain Management Plan  Recent Flowsheet Documentation  Taken 4/17/2024 0200 by Makenzie Cheng RN  Pain Management Interventions: medication (see MAR)  Intervention: Prevent or Manage Pain  Recent Flowsheet Documentation  Taken 4/17/2024 0200 by Makenzie Cheng RN  Medication Review/Management: medications reviewed   Goal Outcome Evaluation:      Plan of Care Reviewed With: patient    Overall Patient Progress: no changeOverall Patient Progress: no change     Pt. Given scheduled Pain medication during shift. Ice pack given to apply to Left hand. Pt. Slept for most of the shift. No acute changes this shift.

## 2024-04-17 NOTE — PROGRESS NOTES
"[facility]    Medicine Progress Note - Hospitalist Service, GOLD TEAM 22    Date of Admission:  4/11/2024    Assessment & Plan   Hailey Alexis is a 89F w/ PMH history of right PICA and small scattered right PCA territory ischemic strokes (6/2020), atrial flutter, osteoporosis, macular degeneration admitted observation status on 4/11/2024 for a cat bite to the left hand.      Today's Plan:  - switch ceftriaxone to Cefpodoxime 400 mg BID and decrease flagyl to BID; both to finish 05/01/24 for 3 week course   - monitor fever curve. Monitor WBC & CRP  - OT recommending OP hand therapy       Cat bite to left hand (~4/8/24)  Patient was initially prescribed doxycycline and metronidazole for this outpatient but she was not able to  prior to coming in.  No associated numbness or sensorimotor concerns.  -Images taken per Ortho available in media and per their note  -Appreciate orthopedics input, performed I&D              - switched to po flagyl and doxycyline              - Follow-up to be determined  - Pain management: As needed Tylenol and oxycodone 2.5 mg every 4 hours as needed  - ID consulted, see recs   - final recs for cefpodoxime/flagyl BID until 05/01/24 with ambulatory ID follow up   - OT recommending OP hand therapy   - hand and wrist MRI completed on 4/14 finalized 4/16: \"There is a tiny underlying fluid collection consistent with abscess, and there is fluid and enhancement around the 4th dorsal compartment tendons at the level of the wrist and proximal metacarpals concerning for septic tenosynovitis. No evidence of septic joint or osteomyelitis.\"  - No indication for OR per ortho due to clinical improvements      Hx right PICA and small scattered right PCA territory ischemic strokes (6/2020)   MRI brain showed an acute right PICA and small scattered right PCA territory ischemic strokes.  Case was discussed with [facility] neurology. Given NIH stroke scale of 0 " "at the time of arrival to emergency department and timeframe near 4.5 hours at the time of decision making, decision was made not to treat with tPA or neuro interventional radiology.  No residual symptoms  -Continue DOAC     Atrial flutter  History of borderline right atrial pressure  Severely enlarged left atrium  Severe tricuspid regurgitation  Prior stroke was felt to be cardioembolic.. TTE 6/2020 with above findings.  Follows with cardiology at [facility] and was last assessed per nursing 9/2023.  -Continue PTA Eliquis  -Continue PTA metoprolol with hold parameters  -hold digoxin- last fill 10/4/23 for 90 days, unclear if taking  - digoxin level      Osteoporosis-continue PTA Fosamax on discharge  HLD-continue statin          Diet: Regular Diet Adult    DVT Prophylaxis: DOAC  Snyder Catheter: Not present  Lines: None     Cardiac Monitoring: None  Code Status: Full Code      Clinically Significant Risk Factors                         # Overweight: Estimated body mass index is 25.86 kg/m  as calculated from the following:    Height as of this encounter: 1.6 m (5' 3\").    Weight as of this encounter: 66.2 kg (146 lb)., PRESENT ON ADMISSION     # Financial/Environmental Concerns: none         Disposition Plan   Medically Ready for Discharge: Anticipated Tomorrow     Weston Hood MD  Hospitalist Service, GOLD TEAM 22  [facility]  Securely message with [employer] (more info)  Text page via [employer] Paging/Directory   See signed in provider for up to date coverage information  ______________________________________________________________________    Interval History   Nursing notes reviewed. No acute events overnight.    Physical Exam   Vital Signs: Temp: 97.4  F (36.3  C) Temp src: Oral BP: (!) 141/88 Pulse: 66   Resp: 17 SpO2: 97 % O2 Device: None (Room air)    Weight: 146 lbs 0 oz    Constitutional: awake, alert, cooperative, no apparent distress, and appears stated " age  Musculoskeletal: mild erythema of L hand that is blanchable; swelling improved; passive ROM intact, active extension remains limited; pain limited strength   Neuropsychiatric: General: normal, calm, and normal eye contact  Level of consciousness: alert / normal  Affect: normal  Orientation: oriented to self, place, time and situation    Medical Decision Making   45 MINUTES SPENT BY ME on the date of service doing chart review, history, exam, documentation & further activities per the note.      Data     I have personally reviewed the following data over the past 24 hrs:    8.2  \   13.7   / 300     N/A N/A N/A /  N/A   N/A N/A N/A \     Procal: N/A CRP: 25.40 (H) Lactic Acid: N/A         Imaging results reviewed over the past 24 hrs:   No results found for this or any previous visit (from the past 24 hour(s)).

## 2024-04-17 NOTE — PLAN OF CARE
"Goal Outcome Evaluation:    Shift: [id number]    VS: /82 (BP Location: Right arm)   Pulse 72   Temp 98.4  F (36.9  C) (Oral)   Resp 18   Ht 1.6 m (5' 3\")   Wt 66.2 kg (146 lb)   SpO2 94%   BMI 25.86 kg/m      Pain:  complains of pain, prn oxycodone and scheduled tylenol effective  Neuro: A&Ox4, [place]  Resp: No new SOB or chest pain reported, stable on room air  Diet: Reg diet, needs assistance ordering, poor appetite  Skin: No new skin concerns  LDA: L PIV running TKO  Activity: SBA with rolling walker  Output: LBM 04/17, voids in bathroom adequately.    Call light within reach     Plan to potentially discharge tomorrow                 "

## 2024-04-18 VITALS
WEIGHT: 146 LBS | HEIGHT: 63 IN | TEMPERATURE: 97.4 F | RESPIRATION RATE: 19 BRPM | SYSTOLIC BLOOD PRESSURE: 118 MMHG | HEART RATE: 66 BPM | OXYGEN SATURATION: 98 % | DIASTOLIC BLOOD PRESSURE: 69 MMHG | BODY MASS INDEX: 25.87 KG/M2

## 2024-04-18 LAB
CRP SERPL-MCNC: 17.99 MG/L
ERYTHROCYTE [DISTWIDTH] IN BLOOD BY AUTOMATED COUNT: 14.6 % (ref 10–15)
HCT VFR BLD AUTO: 43.4 % (ref 35–47)
HGB BLD-MCNC: 14.7 G/DL (ref 11.7–15.7)
MCH RBC QN AUTO: 30.8 PG (ref 26.5–33)
MCHC RBC AUTO-ENTMCNC: 33.9 G/DL (ref 31.5–36.5)
MCV RBC AUTO: 91 FL (ref 78–100)
PLATELET # BLD AUTO: 347 10E3/UL (ref 150–450)
RBC # BLD AUTO: 4.77 10E6/UL (ref 3.8–5.2)
WBC # BLD AUTO: 8.3 10E3/UL (ref 4–11)

## 2024-04-18 PROCEDURE — 99239 HOSP IP/OBS DSCHRG MGMT >30: CPT | Performed by: STUDENT IN AN ORGANIZED HEALTH CARE EDUCATION/TRAINING PROGRAM

## 2024-04-18 PROCEDURE — 85014 HEMATOCRIT: CPT | Performed by: STUDENT IN AN ORGANIZED HEALTH CARE EDUCATION/TRAINING PROGRAM

## 2024-04-18 PROCEDURE — 250N000013 HC RX MED GY IP 250 OP 250 PS 637: Performed by: PHYSICIAN ASSISTANT

## 2024-04-18 PROCEDURE — 250N000013 HC RX MED GY IP 250 OP 250 PS 637: Performed by: STUDENT IN AN ORGANIZED HEALTH CARE EDUCATION/TRAINING PROGRAM

## 2024-04-18 PROCEDURE — 86140 C-REACTIVE PROTEIN: CPT | Performed by: STUDENT IN AN ORGANIZED HEALTH CARE EDUCATION/TRAINING PROGRAM

## 2024-04-18 PROCEDURE — 36415 COLL VENOUS BLD VENIPUNCTURE: CPT | Performed by: STUDENT IN AN ORGANIZED HEALTH CARE EDUCATION/TRAINING PROGRAM

## 2024-04-18 PROCEDURE — 99233 SBSQ HOSP IP/OBS HIGH 50: CPT | Performed by: PHYSICIAN ASSISTANT

## 2024-04-18 PROCEDURE — 250N000013 HC RX MED GY IP 250 OP 250 PS 637: Performed by: INTERNAL MEDICINE

## 2024-04-18 RX ORDER — METRONIDAZOLE 500 MG/1
500 TABLET ORAL 2 TIMES DAILY
Qty: 26 TABLET | Refills: 0 | Status: SHIPPED | OUTPATIENT
Start: 2024-04-18 | End: 2024-05-01

## 2024-04-18 RX ORDER — METOPROLOL SUCCINATE 25 MG/1
25 TABLET, EXTENDED RELEASE ORAL DAILY
Qty: 90 TABLET | Refills: 3 | Status: SHIPPED | OUTPATIENT
Start: 2024-04-19 | End: 2025-04-14

## 2024-04-18 RX ORDER — CEFPODOXIME PROXETIL 200 MG/1
400 TABLET, FILM COATED ORAL 2 TIMES DAILY
Qty: 52 TABLET | Refills: 0 | Status: SHIPPED | OUTPATIENT
Start: 2024-04-18 | End: 2024-06-11

## 2024-04-18 RX ADMIN — THERA TABS 1 TABLET: TAB at 08:45

## 2024-04-18 RX ADMIN — ACETAMINOPHEN 975 MG: 325 TABLET, FILM COATED ORAL at 03:40

## 2024-04-18 RX ADMIN — CEFPODOXIME PROXETIL 400 MG: 200 TABLET, FILM COATED ORAL at 08:45

## 2024-04-18 RX ADMIN — ACETAMINOPHEN 975 MG: 325 TABLET, FILM COATED ORAL at 11:22

## 2024-04-18 RX ADMIN — APIXABAN 2.5 MG: 2.5 TABLET, FILM COATED ORAL at 08:45

## 2024-04-18 RX ADMIN — Medication 1 CAPSULE: at 08:44

## 2024-04-18 RX ADMIN — METOPROLOL SUCCINATE 25 MG: 25 TABLET, EXTENDED RELEASE ORAL at 08:45

## 2024-04-18 RX ADMIN — METRONIDAZOLE 500 MG: 500 TABLET ORAL at 08:44

## 2024-04-18 RX ADMIN — Medication 25 MCG: at 08:44

## 2024-04-18 RX ADMIN — Medication 2.5 MG: at 08:44

## 2024-04-18 ASSESSMENT — ACTIVITIES OF DAILY LIVING (ADL)
ADLS_ACUITY_SCORE: 34

## 2024-04-18 NOTE — PROGRESS NOTES
[facility] General ID Service: Follow Up Note      Patient:  Hailey Alexis   Date of birth 2/10/1930, Medical record number [id number]  Date of Visit:  04/18/2024  Date of Admission: 4/11/2024         Assessment and Recommendations:   ID Problem List:  Infected cat bite c/b septic tenosynovitis of 4th extensor  - Tetanus booster given 4/11/24.  - MRI with tenosynovitis and small abscess  - Per ortho, no plans for surgical intervention  Penicilln allergy - patient reports rash not requiring hospitalization about 20 years ago. Unclear how immediate the rash was. She doesn't remember many of the specifics but is quite sure she had no face/tongue swelling or difficulty breathing and that she did not require a hospital stay for it. Tolerated cefpodoxime challenge 4/14    Recommendations:  Can transition from ceftriaxone to cefpodoxime 400mg PO q12hrs  - alternative is cefdinir 300mg PO q12hrs if issues with formulary or insurance coverage, however cefpodoxime is preferred  Continue metronidazole 500mg PO- OK to decrease to q12hr dosing   Will need to stay on abx until ID follow up - anticipating she will need a total of 2-3 weeks, today is day #7.5 of abx  Follow up in ID clinic on 4/24/24 with Dr. Lemus  ID will sign off at this time, please don't hesitate to contact the [facility] ID team should further questions arise.      Discussion:  Hailey Alexis is a 89 yo woman with history of a cat bite to her left hand which developed swelling and pain about 48 hours after the bite. Due to her listed anaphylaxis to penicillin, she was started on an alternative regimen for cat bite of doxycycline and metronidazole. These have been given IV to date. This combo does give overall good bite coverage, though it is suboptimal for streptococci. Since she has had slow improvement and her penicillin allergy was not anaphylaxis (see problem list), test dose of cefpodoxime was given and well tolerated. Transitioned from  doxycycline to ceftriaxone on 4/15 for improved strep and gram negative coverage given the slow response.     MRI (4/15, read AM 4/16) with tiny abscess and fluid enhancement around 4th dorsal compartment tendons concerning for septic tenosynovitis.  Ortho evaluated and recommended antibiotics alone without surgical intervention. Redness, swelling, and ROM continuing to improve. Suspect strep involvement given more rapid improvement after change to ceftriaxone. Transitioned to PO therapy on 4/17 Anticipate she will need a longer course for tenosynovitis without surgical intervention. Plan for ID follow up on 4/24.      Recs were discussed with primary team today. Don't hesitate to call with questions.     Attestation:  I have reviewed today's vital signs, medications, labs and imaging.    Jennie Agosto PA-C  Pronouns: she/her/hers  Infectious Diseases  Contact via [employer] or [employer] Paging/Directory        50 MINUTES SPENT BY ME on the date of service doing chart review, history, exam, documentation & further activities per the note.             Interval History:       Feeling well this morning, hoping to go home. Hand is less painful today. More motion of fingers- now has small amount of active extension of 4th digit. No new fevers, chills, nausea, vomiting, rashes, diarrhea. Dressing changed- scant red/brown drainage on old dressing, no drainage expressed.    States that she plans to get rid of the cat that bit her- has had it for about a year and this is not the first bite.          Current Antimicrobials   Current:  - cefpodoxime 4/17-present  - metronidazole 4/11- present    Prior:  - ceftriaxone 4/15- 4/17  - doxycycline 4/11- 4/14  - cefpodoxime challenge dose - tolerated  - clindamycin 4/11  - TMP/SMX 4/11         Physical Exam:   Ranges for vital signs:  Temp:  [97.3  F (36.3  C)-98.4  F (36.9  C)] 97.3  F (36.3  C)  Pulse:  [60-72] 60  Resp:  [17-20] 18  BP: (115-154)/(71-96) 154/96  SpO2:  [94 %-99 %] 95  %    Intake/Output Summary (Last 24 hours) at 4/15/2024 0942  Last data filed at 4/15/2024 0859  Gross per 24 hour   Intake 480 ml   Output --   Net 480 ml     Exam:  GENERAL:  well-developed, well-nourished, sitting in chair in no acute distress.   ENT:  Head is normocephalic, atraumatic. Oropharynx is moist without exudates or ulcers.  EYES:  Eyes have anicteric sclerae, no conjunctival injection.    LUNGS:  Normal respiratory effort on RA.  MUSCULOSKELETAL: +active flexion of fingers of left hand. +extension of digits 1, 2, and improving extension at 3 and 5, now with small amount of active extension of 4th digit.   SKIN:  No acute rashes.  +swelling over MCP joints continuing to improve, +swelling on dorsum of hand improving. Dorsum of hand without significant drainage from wound - scant red-brown dry drainage on old dressing. No drainage expressed. Erythema resolved.                     Laboratory Data:     Inflammatory Markers    Recent Labs   Lab Test 04/17/24  0723 04/16/24  0656 04/15/24  1615 04/11/24  1551   CRPI 25.40* 37.91* 50.14* 117.49*       Hematology Studies    Recent Labs   Lab Test 04/17/24  0723 04/16/24  0656 04/15/24  1615 04/13/24  0946   WBC 8.2 9.3 9.1 11.3*   HGB 13.7 13.9 13.6 13.5   MCV 92 93 91 94    279 275 231     No lab results found.    Metabolic Studies     Recent Labs   Lab Test 04/16/24  0656 04/13/24  0946 04/12/24  0710 04/11/24  1551    138 137 139   POTASSIUM 4.9 4.6 4.3 4.5   CHLORIDE 105 106 104 102   CO2 25 25 26 27   BUN 20.4 24.2* 22.8 21.9   CR 0.82 0.77 0.84 0.83   GFRESTIMATED 66 71 64 65       Hepatic Studies    Recent Labs   Lab Test 04/11/24  1551   BILITOTAL 0.8   ALKPHOS 141   ALBUMIN 4.0   AST 30   ALT 17            Imaging:   MRI left wrist/hand 4/15/2024   IMPRESSION:  1.  There is an open wound at the dorsal aspect of the wrist with adjacent cellulitis. There is a tiny underlying fluid collection consistent with abscess, and there is fluid and  enhancement around the 4th dorsal compartment tendons at the level of the   wrist and proximal metacarpals concerning for septic tenosynovitis. No evidence of septic joint or osteomyelitis.  2.  Multifocal osteoarthrosis.  3.  Full-thickness tear of the TFCC. Positive ulnar variance.    XR left hand 4/11/24  IMPRESSION: No definite radiopaque foreign body is identified. Soft tissue swelling dorsally. No acute fracture or malalignment. Severe STT and fourth DIP joint degenerative changes. Otherwise mild to moderate degenerative changes throughout the hand and   wrist. Osteopenia. Positive ulnar variance.

## 2024-04-18 NOTE — PROGRESS NOTES
6MS DISCHARGE    D: Patient discharged to home at 1540 . Patient accompanied by son.    I: Discharge prescriptions given to patient. All discharge medications and instructions reviewed with patient and son. Other phone numbers to call with questions or concerns after discharge reviewed. PIV removed. Education completed.    A: Patient verbalized understanding of discharge medications and instructions. Prescribed home medications given to patient.  Belongings returned to patient.    P: Patient has two follow-up appointments scheduled. .

## 2024-04-18 NOTE — PROGRESS NOTES
Care Management Discharge Note    Discharge Date: 04/18/2024       Discharge Disposition: Home    Discharge Services: None    Discharge DME: Walker    Discharge Transportation: family or friend will provide    Private pay costs discussed: Not applicable    Does the patient's insurance plan have a 3 day qualifying hospital stay waiver?  No    PAS Confirmation Code:    Patient/family educated on Medicare website which has current facility and service quality ratings: no    Education Provided on the Discharge Plan: Yes  Persons Notified of Discharge Plans: Pt  Patient/Family in Agreement with the Plan:  Yes    Handoff Referral Completed: No    Additional Information:  RNCC notified pt is medically ready to discharge home with family on oral ABX. No other discharge needs. EHO complete. Family to transport.      Trisha Dacosta RN   Nurse Coordinator    6 Med/Surg  Phone: [phone]    Social Work and Care Management Department     SEARCHABLE in [facility] - search CARE COORDINATOR     [employer] [place] ([phone]) Saturday & Sunday; (1911-1988) [facility] Recognized Holidays     Units: 5A Onc 5201 thru 5219 RNCC, 5A Onc 5220 thru 5240 RNCC, 5C OFFSERVICE [id number] RNCC & 5C OFF SERVICE [phone] RNCC Pager: [phone]    Units: 6B Vocera, 6C Card 6401 thru 6420 RNCC, 6C Card 6502 thru 6514 RNCC, & 6C Card 6515 thru 6519 RNCC  Pager: [phone]    Units: 7A SOT RNCC Vocera, 7B Med Surg Vocera, 7C Med Surg 7401 thru 7418 RNCC & 7C Med Surg 7502 thru 7521 RNCC Pager: [phone]    Units: 6A Vocera & 4A CVICU Vocera, 4C MICU Vocera, and 4E SICU Vocera   Pager: [phone]    Units: 5 Ortho Vocera & 5 Med Surg Vocera  Pager: [phone]    Units: 6 Med Surg Vocera & 8 Med Surg Vocera Pager [phone]

## 2024-04-18 NOTE — DISCHARGE SUMMARY
"[facility]  Hospitalist Discharge Summary      Date of Admission:  4/11/2024  Date of Discharge:  4/18/2024  Discharging Provider: Weston Hood MD  Discharge Service: Hospitalist Service, GOLD TEAM 22    Discharge Diagnoses   Cat bite to left hand (~4/8/24)  Cellulitis  Hx right PICA and small scattered right PCA territory ischemic strokes (6/2020)  Atrial flutter  History of borderline right atrial pressure  Severely enlarged left atrium  Severe tricuspid regurgitation      Clinically Significant Risk Factors     # Overweight: Estimated body mass index is 25.86 kg/m  as calculated from the following:    Height as of this encounter: 1.6 m (5' 3\").    Weight as of this encounter: 66.2 kg (146 lb).       Follow-ups Needed After Discharge   Follow-up Appointments     Adult [facility] Follow-up and recommended labs and tests      Follow up with primary care provider, Gabe Etienne, within 7 days   for hospital follow- up.  The following labs/tests are recommended: WBC   and CRP.      Appointments on [place] and/or [facility] (with [facility] or [facility]   provider or service). Call [phone] if you haven't heard regarding   these appointments within 7 days of discharge.            Unresulted Labs Ordered in the Past 30 Days of this Admission       No orders found from 3/12/2024 to 4/12/2024.          Discharge Disposition   Discharged to home  Condition at discharge: Stable    Hospital Course   Hailey Alexis is a 89F w/ PMH history of right PICA and small scattered right PCA territory ischemic strokes (6/2020), atrial flutter, osteoporosis, macular degeneration admitted observation status on 4/11/2024 for a cat bite to the left hand.      Today's Plan:  - switch ceftriaxone to Cefpodoxime 400 mg BID and decrease flagyl to BID; both to finish 05/01/24 for 3 week course or until ambulatory ID follow up on 4/24/24 with Dr. Lemus   - monitor fever curve. WBC " "& CRP continuing to decrease on PO antibiotics   - OT recommending OP hand therapy       Cat bite to left hand (~4/8/24)  Cellulitis  Patient was initially prescribed doxycycline and metronidazole for this outpatient but she was not able to  prior to coming in.  No associated numbness or sensorimotor concerns.  -Images taken per Ortho available in media and per their note  -Appreciate orthopedics input, performed I&D              - switched to po flagyl and doxycyline              - Follow-up to be determined  - Pain management: As needed Tylenol and oxycodone 2.5 mg every 4 hours as needed  - ID consulted, see recs   - final recs for cefpodoxime/flagyl BID until 05/01/24 or until ambulatory ID follow up on 4/24/24 with Dr. Lemus   - OT recommending OP hand therapy   - hand and wrist MRI completed on 4/14 finalized 4/16: \"There is a tiny underlying fluid collection consistent with abscess, and there is fluid and enhancement around the 4th dorsal compartment tendons at the level of the wrist and proximal metacarpals concerning for septic tenosynovitis. No evidence of septic joint or osteomyelitis.\"  - No indication for OR per ortho due to clinical improvements      Hx right PICA and small scattered right PCA territory ischemic strokes (6/2020)   MRI brain showed an acute right PICA and small scattered right PCA territory ischemic strokes.  Case was discussed with [facility] neurology. Given NIH stroke scale of 0 at the time of arrival to emergency department and timeframe near 4.5 hours at the time of decision making, decision was made not to treat with tPA or neuro interventional radiology.  No residual symptoms  -Continue DOAC     Atrial flutter  History of borderline right atrial pressure  Severely enlarged left atrium  Severe tricuspid regurgitation  Prior stroke was felt to be cardioembolic.. TTE 6/2020 with above findings.  Follows with cardiology at [facility] and was last assessed per nursing 9/2023.  -Continue " PTA Eliquis  -Continue PTA metoprolol with hold parameters  -hold digoxin- last fill 10/4/23 for 90 days, unclear if taking  - digoxin level      Osteoporosis-continue PTA Fosamax on discharge  HLD-continue statin    Consultations This Hospital Stay   MEDICATION HISTORY IP PHARMACY CONSULT  INFECTIOUS DISEASE GENERAL ADULT IP CONSULT  INFECTIOUS DISEASE [facility] ADULT IP CONSULT  INFECTIOUS DISEASE [facility] ADULT IP CONSULT  NURSING TO CONSULT FOR VASCULAR ACCESS CARE IP CONSULT  CARE MANAGEMENT / SOCIAL WORK IP CONSULT  PHYSICAL THERAPY ADULT IP CONSULT  OCCUPATIONAL THERAPY ADULT IP CONSULT    Code Status   Full Code    Time Spent on this Encounter   IWeston MD, personally saw the patient today and spent greater than 30 minutes discharging this patient.       Weston Hood MD  [facility] MED SURG  [address] [place] [id number]  Phone: [phone]  Fax: [phone]  ______________________________________________________________________    Physical Exam   Vital Signs: Temp: 97.4  F (36.3  C) Temp src: Oral BP: 118/69 Pulse: 66   Resp: 19 SpO2: 98 % O2 Device: None (Room air)    Weight: 146 lbs 0 oz    Constitutional: awake, alert, cooperative, no apparent distress, and appears stated age  Musculoskeletal: mild erythema of L hand that is blanchable; swelling improved; passive ROM intact, active extension remains limited; pain limited strength   Neuropsychiatric: General: normal, calm, and normal eye contact  Level of consciousness: alert / normal  Affect: normal  Orientation: oriented to self, place, time and situation          Primary Care Physician   Gabe Etienne    Discharge Orders      Occupational Therapy  Referral      Reason for your hospital stay    Cat Bite; Cellulitis     Activity    Your activity upon discharge: activity as tolerated     Adult [facility] Follow-up and recommended labs and tests    Follow up with primary care  provider, Gabe Etienne, within 7 days for hospital follow- up.  The following labs/tests are recommended: WBC and CRP.      Appointments on [place] and/or [facility] (with [facility] or [facility] provider or service). Call [phone] if you haven't heard regarding these appointments within 7 days of discharge.     Wound care and dressings    Instructions to care for your wound at home: daily dressing changes and keep wound clean and dry.     Diet    Follow this diet upon discharge: Orders Placed This Encounter      Regular Diet Adult       Significant Results and Procedures   Most Recent 3 CBC's:  Recent Labs   Lab Test 04/18/24  0950 04/17/24  0723 04/16/24  0656   WBC 8.3 8.2 9.3   HGB 14.7 13.7 13.9   MCV 91 92 93    300 279     Most Recent 3 BMP's:  Recent Labs   Lab Test 04/16/24  0656 04/13/24  0946 04/13/24  0203 04/12/24  0710    138  --  137   POTASSIUM 4.9 4.6  --  4.3   CHLORIDE 105 106  --  104   CO2 25 25  --  26   BUN 20.4 24.2*  --  22.8   CR 0.82 0.77  --  0.84   ANIONGAP 8 7  --  7   CASSIE 9.1 8.5  --  8.3   GLC 88 91 108* 87     7-Day Micro Results       Collected Updated Procedure Result Status      04/15/2024 1635 04/15/2024 1950 MRSA MSSA PCR, Nasal Swab [[id number]]    Swab from Nares, Bilateral    Final result Component Value   MRSA Target DNA Negative   SA Target DNA Negative                  Most Recent ESR & CRP:  Recent Labs   Lab Test 04/18/24  0950   CRPI 17.99*   ,   Results for orders placed or performed during the hospital encounter of 04/11/24   XR Hand Left G/E 3 Views    Narrative    EXAM: XR HAND LEFT G/E 3 VIEWS  LOCATION: [facility]  DATE: 4/11/2024    INDICATION: Cat bite, r o retained tooth  COMPARISON: None.      Impression    IMPRESSION: No definite radiopaque foreign body is identified. Soft tissue swelling dorsally. No acute fracture or malalignment. Severe STT and fourth DIP joint degenerative changes.  Otherwise mild to moderate degenerative changes throughout the hand and   wrist. Osteopenia. Positive ulnar variance.   MR Hand Left w/o & w Contrast    Narrative    EXAM: MR HAND LEFT W/O and W CONTRAST  LOCATION: [facility]  DATE: 4/14/2024    INDICATION: Hand pain and swelling. Evaluate for osteomyelitis and septic joint. Wrist cat bite. Erythema.  COMPARISON: Radiographs from 4/11/2024.  TECHNIQUE: Routine. Additional postgadolinium T1 sequences were obtained.  IV CONTRAST: 6.6 mL Gadavist.    FINDINGS:     TENDONS:   -Extensor tendons: There is fluid and enhancement around the 4th dorsal compartment tendons at the level of the wrist and extending into the proximal hand to the level of the proximal metacarpals. No tendinosis or tear. The other extensor tendons appear   normal.  -Flexor tendons: No tear, tendinopathy, or tenosynovitis.    LIGAMENTS:  -Full-thickness tear of the TFCC.    JOINTS AND BONES:   -Moderate to severe osteoarthrosis at the radiocarpal and ulnocarpal joints and in the midcarpal row. Severe osteoarthrosis of the 1st CMC joint. Mild to moderate osteoarthrosis of the 1st MCP joint and several of the interphalangeal joints. No effusion   or synovitis. There are degenerative cysts in the distal radius and ulna and throughout the carpal bones and in the 1st metacarpal head, but there is no evidence of an active erosion or osteitis. Positive ulnar variance. No cortical destruction or   periosteal reaction. Nothing to suggest septic joint or osteomyelitis.    MUSCLES AND SOFT TISSUES:   -No muscle atrophy or edema. No abnormal muscle enhancement or absent enhancement. There is superficial soft tissue edema and enhancement in the dorsal aspect of the wrist and hand at the metacarpal levels. There is an open wound dorsal to the 4th dorsal   compartment tendons. There is a small underlying fluid collection measuring 1 to 2 mm in depth x 8 mm transverse x  10 mm in length, superficial to the extensor tendon compartment.      Impression    IMPRESSION:  1.  There is an open wound at the dorsal aspect of the wrist with adjacent cellulitis. There is a tiny underlying fluid collection consistent with abscess, and there is fluid and enhancement around the 4th dorsal compartment tendons at the level of the   wrist and proximal metacarpals concerning for septic tenosynovitis. No evidence of septic joint or osteomyelitis.  2.  Multifocal osteoarthrosis.  3.  Full-thickness tear of the TFCC. Positive ulnar variance.   MR Wrist Left w/o & w Contrast    Narrative    EXAM: MR WRIST LEFT W/O and W CONTRAST  LOCATION: [facility]  DATE: 04/14/2024    INDICATION: Left wrist cat bite with erythema, concern for septic joint  osteomyelitis.  COMPARISON: Radiographs from 04/11/2024.  TECHNIQUE: Routine. Additional post-gadolinium T1 sequences were obtained.  IV CONTRAST: 6.6 mL Gadavist.    FINDINGS:  TENDONS:   -Extensor compartment tendons: Moderate amount of fluid and enhancement around the 4th dorsal compartment tendons at the level of the wrist and proximal metacarpals.   -Flexor compartment tendons: No tear, tendinopathy, or tenosynovitis.    TRIANGULAR FIBROCARTILAGE COMPLEX:   -Full-thickness tear of the TFCC.    LIGAMENTS:   -Scapholunate and lunotriquetral ligament: Because of patient motion, the scapholunate and lunotriquetral ligaments are difficult to assess.    JOINTS AND BONES:   -Moderate to severe osteoarthrosis of the radiocarpal and ulnocarpal joints. Moderate osteoarthrosis of the midcarpal joints. Moderate to severe osteoarthrosis at the 1st CMC joint. Mild osteoarthrosis of the 1st MCP joint. No erosions or osteitis. No   cortical destruction or periosteal reaction. No effusion or synovitis. Positive ulnar variance.    SOFT TISSUES:   -Ganglion/Synovial cyst: None.  -Nerves: Visualized median and ulnar nerves appear  intact. Guyon's canal is normal.  Other soft tissues: There is an open wound dorsal to the 4th dorsal compartment tendons at the level of the wrist and there is a tiny underlying area of absent enhancement suggesting a fluid collection, measuring only 1 or 2 mm in depth x about 8 mm   transverse x about 10 mm in length. There is also superficial soft tissue edema and enhancement in the soft tissues of the wrist and proximal hand diffusely.      Impression    IMPRESSION:  1.  There is an open wound dorsal to the wrist with a tiny underlying area of absent enhancement suggesting a small abscess. There is fluid and enhancement around the underlying 4th dorsal compartment tendons, likely septic tenosynovitis in this setting.   There is no evidence of underlying myositis, septic joint, or osteomyelitis.  2.  Multifocal osteoarthrosis.  3.  Positive ulnar variance and TFCC tear.       Discharge Medications   Current Discharge Medication List        START taking these medications    Details   cefpodoxime (VANTIN) 200 MG tablet Take 2 tablets (400 mg) by mouth 2 times daily for 13 days  Qty: 52 tablet, Refills: 0    Associated Diagnoses: Cat bite, initial encounter; Cellulitis of left upper extremity           CONTINUE these medications which have CHANGED    Details   metoprolol succinate ER (TOPROL XL) 25 MG 24 hr tablet Take 1 tablet (25 mg) by mouth daily for 360 days  Qty: 90 tablet, Refills: 3    Associated Diagnoses: Hx of atrial flutter      metroNIDAZOLE (FLAGYL) 500 MG tablet Take 1 tablet (500 mg) by mouth 2 times daily for 13 days  Qty: 26 tablet, Refills: 0    Associated Diagnoses: Cat bite, initial encounter; Cellulitis of left upper extremity           CONTINUE these medications which have NOT CHANGED    Details   Alendronate Sodium (FOSAMAX PO) Take 70 mg by mouth once a week      calcium-vitamin D (CALTRATE) 600-400 MG-UNIT per tablet Take 2 tablets by mouth daily      ELIQUIS ANTICOAGULANT 2.5 MG tablet  Take 1 Tablet (2.5 mg) by mouth two times a day.*      Multiple Vitamins-Minerals (PRESERVISION AREDS 2) CAPS Take 1 capsule by mouth 2 times daily      multivitamin, therapeutic (THERA-VIT) TABS Take 1 tablet by mouth daily      Vitamin D3 (CHOLECALCIFEROL) 25 mcg (1000 units) tablet Take 1,000 Units by mouth daily      doxycycline hyclate (VIBRAMYCIN) 100 MG capsule Take 1 capsule (100 mg) by mouth 2 times daily for 14 days  Qty: 28 capsule, Refills: 0    Associated Diagnoses: Cat bite, initial encounter      traZODone (DESYREL) 50 MG tablet Take 1.5 tabs at around 6-7pm  Qty: 135 tablet, Refills: 1    Associated Diagnoses: Other insomnia           STOP taking these medications       digoxin (LANOXIN) 125 MCG tablet Comments:   Reason for Stopping:             Allergies   Allergies   Allergen Reactions    Penicillin G Rash     Patient reports rash not requiring hospitalization in ~2000. Doesn't remember specifics but no tongue/throat swelling and no difficulty breathing. Reviewed with her 4/14/24.  Tolerated cefpodoxime 4/14/24, ceftriaxone 4/15

## 2024-04-18 NOTE — PLAN OF CARE
Alert and oriented x4. Patient is hard of hearing so have to speak loudly and clearly. Med-compliant. Calm and cooperative. Assist of 1 with ambulation, uses her own rolling walker. Endorsed left hand pain at 5/10. Received scheduled and PRN pain medication. Right PIV is infusing normal saline to keep open. Call light within reach.   Problem: Adult Inpatient Plan of Care  Goal: Absence of Hospital-Acquired Illness or Injury  Outcome: Progressing  Intervention: Identify and Manage Fall Risk  Recent Flowsheet Documentation  Taken 4/18/2024 1000 by Sherry Peña RN  Safety Promotion/Fall Prevention: activity supervised     Problem: Pain Acute  Goal: Optimal Pain Control and Function  Outcome: Progressing  Intervention: Develop Pain Management Plan  Recent Flowsheet Documentation  Taken 4/18/2024 0844 by Sherry Peña RN  Pain Management Interventions: medication (see MAR)     Problem: Adult Inpatient Plan of Care  Goal: Optimal Comfort and Wellbeing  Intervention: Monitor Pain and Promote Comfort  Recent Flowsheet Documentation  Taken 4/18/2024 0844 by Sherry Peña RN  Pain Management Interventions: medication (see MAR)     Problem: Fall Injury Risk  Goal: Absence of Fall and Fall-Related Injury  Intervention: Promote Injury-Free Environment  Recent Flowsheet Documentation  Taken 4/18/2024 1000 by Sherry Peña RN  Safety Promotion/Fall Prevention: activity supervised   Goal Outcome Evaluation:

## 2024-04-18 NOTE — PLAN OF CARE
[id number]    Problem: Adult Inpatient Plan of Care  Goal: Plan of Care Review  Description: The Plan of Care Review/Shift note should be completed every shift.  The Outcome Evaluation is a brief statement about your assessment that the patient is improving, declining, or no change.  This information will be displayed automatically on your shift  note.  Flowsheets (Taken 4/18/2024 0036)  Plan of Care Reviewed With: patient  Overall Patient Progress: improving  Goal: Absence of Hospital-Acquired Illness or Injury  Intervention: Prevent and Manage VTE (Venous Thromboembolism) Risk  Recent Flowsheet Documentation  Taken 4/18/2024 0000 by Noa Garcia RN  VTE Prevention/Management: SCDs (sequential compression devices) off  Goal: Optimal Comfort and Wellbeing  Outcome: Progressing  Goal: Readiness for Transition of Care  Outcome: Progressing     Goal Outcome Evaluation:    Plan of Care Reviewed With: patient  Overall Patient Progress: improving    Pt is Awake, alert, oriented x 4, not in distress, able to make needs known. Pain managed with scheduled tylenol and PRN oxycodone. No other complaints of discomfort. Call light within reach. No acute event. Sleep throughout the shift.

## 2024-04-19 NOTE — PLAN OF CARE
Occupational Therapy Discharge Summary    Reason for therapy discharge:    Discharged to home.    Progress towards therapy goal(s). See goals on Care Plan in [facility] electronic health record for goal details.  Goals partially met.  Barriers to achieving goals:   discharge from facility.    Therapy recommendation(s):    No further therapy is recommended.

## 2024-04-24 ENCOUNTER — LAB (OUTPATIENT)
Dept: LAB | Facility: CLINIC | Age: 89
End: 2024-04-24
Payer: COMMERCIAL

## 2024-04-24 ENCOUNTER — OFFICE VISIT (OUTPATIENT)
Dept: INFECTIOUS DISEASES | Facility: CLINIC | Age: 89
End: 2024-04-24
Attending: STUDENT IN AN ORGANIZED HEALTH CARE EDUCATION/TRAINING PROGRAM
Payer: COMMERCIAL

## 2024-04-24 VITALS
BODY MASS INDEX: 25.87 KG/M2 | HEART RATE: 60 BPM | TEMPERATURE: 98 F | SYSTOLIC BLOOD PRESSURE: 127 MMHG | DIASTOLIC BLOOD PRESSURE: 76 MMHG | HEIGHT: 63 IN | OXYGEN SATURATION: 95 % | WEIGHT: 146 LBS

## 2024-04-24 DIAGNOSIS — Z51.81 ENCOUNTER FOR MONITORING DIGOXIN THERAPY: ICD-10-CM

## 2024-04-24 DIAGNOSIS — L03.114 CELLULITIS OF LEFT UPPER EXTREMITY: ICD-10-CM

## 2024-04-24 DIAGNOSIS — Z79.899 ENCOUNTER FOR MONITORING DIGOXIN THERAPY: ICD-10-CM

## 2024-04-24 DIAGNOSIS — Z51.81 THERAPEUTIC DRUG MONITORING: ICD-10-CM

## 2024-04-24 DIAGNOSIS — M65.949 TENOSYNOVITIS OF FINGER: ICD-10-CM

## 2024-04-24 DIAGNOSIS — L03.114 CELLULITIS OF LEFT UPPER EXTREMITY: Primary | ICD-10-CM

## 2024-04-24 PROCEDURE — G0463 HOSPITAL OUTPT CLINIC VISIT: HCPCS | Performed by: STUDENT IN AN ORGANIZED HEALTH CARE EDUCATION/TRAINING PROGRAM

## 2024-04-24 PROCEDURE — 80162 ASSAY OF DIGOXIN TOTAL: CPT | Performed by: FAMILY MEDICINE

## 2024-04-24 PROCEDURE — 86140 C-REACTIVE PROTEIN: CPT | Performed by: FAMILY MEDICINE

## 2024-04-24 PROCEDURE — 99000 SPECIMEN HANDLING OFFICE-LAB: CPT | Performed by: PATHOLOGY

## 2024-04-24 PROCEDURE — 99214 OFFICE O/P EST MOD 30 MIN: CPT | Performed by: STUDENT IN AN ORGANIZED HEALTH CARE EDUCATION/TRAINING PROGRAM

## 2024-04-24 PROCEDURE — 36415 COLL VENOUS BLD VENIPUNCTURE: CPT | Performed by: PATHOLOGY

## 2024-04-24 ASSESSMENT — PAIN SCALES - GENERAL: PAINLEVEL: MODERATE PAIN (5)

## 2024-04-24 NOTE — LETTER
4/24/2024       RE: Hailey Alexis  [address]  [place] [id number]     Dear Colleague,    Thank you for referring your patient, Hailey Alexis, to the [facility] INFECTIOUS DISEASE CLINIC [place] at [facility]. Please see a copy of my visit note below.         ID CLINIC FOLLOW UP NOTE      Patient:  Hailey Alexis   Date of birth 2/10/1930, Medical record number [id number]  Date of Visit:  4/24/24           Assessment and Recommendations:   ID Problem List:  Infected cat bite c/b septic tenosynovitis of 4th extensor  - Tetanus booster given 4/11/24.  - MRI with tenosynovitis and small abscess  - Per ortho, no plans for surgical intervention  Penicilln allergy - patient reports rash not requiring hospitalization about 20 years ago. Unclear how immediate the rash was. She doesn't remember many of the specifics but is quite sure she had no face/tongue swelling or difficulty breathing and that she did not require a hospital stay for it. Tolerated cefpodoxime challenge 4/14      Discussion:  Pt seen by my ID colleagues in the hospital, here for follow up, last seen by Miri Agosto PA-C    Hailey Alexis is a 89 yo woman with history of a cat bite to her left hand which developed swelling and pain about 48 hours after the bite. Due to her listed anaphylaxis to penicillin, she was started on an alternative regimen for cat bite of doxycycline and metronidazole. These were given IV. This combo does give overall good bite coverage, though it was suboptimal for streptococci. Since she had slow improvement and her penicillin allergy was not anaphylaxis (see problem list), test dose of cefpodoxime was given and well tolerated. Transitioned from doxycycline to ceftriaxone on 4/15 for improved strep and gram negative coverage given the slow response.     MRI (4/15, read AM 4/16) with tiny abscess and fluid enhancement around 4th dorsal  compartment tendons concerning for septic tenosynovitis.  Ortho evaluated and recommended antibiotics alone without surgical intervention. Redness, swelling, and ROM were continuing to improve during hospitalization. ID suspected strep involvement given more rapid improvement after change to ceftriaxone. Transitioned to PO therapy on 4/17, anticipating she will need a longer 2-3 week course for tenosynovitis without surgical intervention. She was discharged 4/18    Today on Day 9 of expanded regimen, with improved pain and swelling per history and on comparing previous pictures in chart to exam, albeit not completely resolved. We will get a CRP to trend (was trending down previosuly) and follow up May 2. She has enough antibiotics through May 1, and has ortho follow up planned as well.      Recommendations:  - Continue Cefpodoxime 400 mg PO Q12h and Metronidazole 500 mg PO Q12h  through May 1  - See me via video follow up May 2 at 330 pm to decide on final course of antibiotics  - Labs downstairs today - we will get a CRP  - Follow up with Ortho 4/29 as planned    I have reviewed interval events, vital, labs, images, cultures and antibiotics    Cami Lemus  Staff Physician  Division of Infectious Diseases             Interval History:     Accompanied by son. Also spoke with nephew on the phone. Pt reports pain better, swelling better, still has pain on moving. We clarified that she is taking both the Cefpodoxime and Metronidazole with Nephew. Denies any discharge.          Current Antimicrobials   Current:  - cefpodoxime 4/17-present  - metronidazole 4/11- present    Prior:  - ceftriaxone 4/15- 4/17  - doxycycline 4/11- 4/14  - cefpodoxime challenge dose - tolerated  - clindamycin 4/11  - TMP/SMX 4/11         Physical Exam:   Ranges for vital signs:  Temp:  [98  F (36.7  C)] 98  F (36.7  C)  Pulse:  [60] 60  BP: (127)/(76) 127/76  SpO2:  [95 %] 95 %    Intake/Output Summary (Last 24 hours) at 4/15/2024  0942  Last data filed at 4/15/2024 0859  Gross per 24 hour   Intake 480 ml   Output --   Net 480 ml     Exam:  GENERAL:  well-developed, well-nourished, sitting in chair in no acute distress.   ENT:  Head is normocephalic, atraumatic. Oropharynx is moist without exudates or ulcers.  EYES:  Eyes have anicteric sclerae, no conjunctival injection.    LUNGS:  Normal respiratory effort on RA.  MUSCULOSKELETAL: Pain on ROM, swelling improved not resolved, erythema resolved,  SKIN:  No acute rashes.          4/17:                   Laboratory Data:     Inflammatory Markers    Recent Labs   Lab Test 04/18/24  0950 04/17/24  0723 04/16/24  0656 04/15/24  1615   CRPI 17.99* 25.40* 37.91* 50.14*       Hematology Studies    Recent Labs   Lab Test 04/18/24  0950 04/17/24  0723 04/16/24  0656 04/15/24  1615   WBC 8.3 8.2 9.3 9.1   HGB 14.7 13.7 13.9 13.6   MCV 91 92 93 91    300 279 275     No lab results found.    Metabolic Studies     Recent Labs   Lab Test 04/16/24  0656 04/13/24  0946 04/12/24  0710 04/11/24  1551    138 137 139   POTASSIUM 4.9 4.6 4.3 4.5   CHLORIDE 105 106 104 102   CO2 25 25 26 27   BUN 20.4 24.2* 22.8 21.9   CR 0.82 0.77 0.84 0.83   GFRESTIMATED 66 71 64 65       Hepatic Studies    Recent Labs   Lab Test 04/11/24  1551   BILITOTAL 0.8   ALKPHOS 141   ALBUMIN 4.0   AST 30   ALT 17            Imaging:   MRI left wrist/hand 4/15/2024   IMPRESSION:  1.  There is an open wound at the dorsal aspect of the wrist with adjacent cellulitis. There is a tiny underlying fluid collection consistent with abscess, and there is fluid and enhancement around the 4th dorsal compartment tendons at the level of the   wrist and proximal metacarpals concerning for septic tenosynovitis. No evidence of septic joint or osteomyelitis.  2.  Multifocal osteoarthrosis.  3.  Full-thickness tear of the TFCC. Positive ulnar variance.    XR left hand 4/11/24  IMPRESSION: No definite radiopaque foreign body is identified. Soft  tissue swelling dorsally. No acute fracture or malalignment. Severe STT and fourth DIP joint degenerative changes. Otherwise mild to moderate degenerative changes throughout the hand and   wrist. Osteopenia. Positive ulnar variance.      Cami Lemus MD

## 2024-04-24 NOTE — PATIENT INSTRUCTIONS
- Continue Cefpodoxime and Metronidazole through May 1  - See me via video follow up May 2 at 330 pm to decide on final course of antibiotics  - Labs downstairs today

## 2024-04-24 NOTE — NURSING NOTE
"Chief Complaint   Patient presents with    Follow Up     /76   Pulse 60   Temp 98  F (36.7  C) (Oral)   Ht 1.6 m (5' 3\")   Wt 66.2 kg (146 lb)   SpO2 95%   BMI 25.86 kg/m    Julia Melton MA on 4/24/2024 at 4:19 PM    "

## 2024-04-25 LAB
CRP SERPL-MCNC: 4.37 MG/L
DIGOXIN SERPL-MCNC: <0.4 NG/ML (ref 0.6–2)

## 2024-04-25 NOTE — PROGRESS NOTES
ID CLINIC FOLLOW UP NOTE      Patient:  Hailey Alexis   Date of birth 2/10/1930, Medical record number [id number]  Date of Visit:  4/24/24           Assessment and Recommendations:   ID Problem List:  1. Infected cat bite c/b septic tenosynovitis of 4th extensor  - Tetanus booster given 4/11/24.  - MRI with tenosynovitis and small abscess  - Per ortho, no plans for surgical intervention  2. Penicilln allergy - patient reports rash not requiring hospitalization about 20 years ago. Unclear how immediate the rash was. She doesn't remember many of the specifics but is quite sure she had no face/tongue swelling or difficulty breathing and that she did not require a hospital stay for it. Tolerated cefpodoxime challenge 4/14      Discussion:  Pt seen by my ID colleagues in the hospital, here for follow up, last seen by Miri Agosto PA-C    Hailey Alexis is a 89 yo woman with history of a cat bite to her left hand which developed swelling and pain about 48 hours after the bite. Due to her listed anaphylaxis to penicillin, she was started on an alternative regimen for cat bite of doxycycline and metronidazole. These were given IV. This combo does give overall good bite coverage, though it was suboptimal for streptococci. Since she had slow improvement and her penicillin allergy was not anaphylaxis (see problem list), test dose of cefpodoxime was given and well tolerated. Transitioned from doxycycline to ceftriaxone on 4/15 for improved strep and gram negative coverage given the slow response.     MRI (4/15, read AM 4/16) with tiny abscess and fluid enhancement around 4th dorsal compartment tendons concerning for septic tenosynovitis.  Ortho evaluated and recommended antibiotics alone without surgical intervention. Redness, swelling, and ROM were continuing to improve during hospitalization. ID suspected strep involvement given more rapid improvement after change to ceftriaxone. Transitioned to PO therapy on  4/17, anticipating she will need a longer 2-3 week course for tenosynovitis without surgical intervention. She was discharged 4/18    Today on Day 9 of expanded regimen, with improved pain and swelling per history and on comparing previous pictures in chart to exam, albeit not completely resolved. We will get a CRP to trend (was trending down previosuly) and follow up May 2. She has enough antibiotics through May 1, and has ortho follow up planned as well.      Recommendations:  - Continue Cefpodoxime 400 mg PO Q12h and Metronidazole 500 mg PO Q12h  through May 1  - See me via video follow up May 2 at 330 pm to decide on final course of antibiotics  - Labs downstairs today - we will get a CRP  - Follow up with Ortho 4/29 as planned    I have reviewed interval events, vital, labs, images, cultures and antibiotics    Cami Lemus  Staff Physician  Division of Infectious Diseases             Interval History:     Accompanied by son. Also spoke with nephew on the phone. Pt reports pain better, swelling better, still has pain on moving. We clarified that she is taking both the Cefpodoxime and Metronidazole with Nephew. Denies any discharge.          Current Antimicrobials   Current:  - cefpodoxime 4/17-present  - metronidazole 4/11- present    Prior:  - ceftriaxone 4/15- 4/17  - doxycycline 4/11- 4/14  - cefpodoxime challenge dose - tolerated  - clindamycin 4/11  - TMP/SMX 4/11         Physical Exam:   Ranges for vital signs:  Temp:  [98  F (36.7  C)] 98  F (36.7  C)  Pulse:  [60] 60  BP: (127)/(76) 127/76  SpO2:  [95 %] 95 %    Intake/Output Summary (Last 24 hours) at 4/15/2024 0942  Last data filed at 4/15/2024 0859  Gross per 24 hour   Intake 480 ml   Output --   Net 480 ml     Exam:  GENERAL:  well-developed, well-nourished, sitting in chair in no acute distress.   ENT:  Head is normocephalic, atraumatic. Oropharynx is moist without exudates or ulcers.  EYES:  Eyes have anicteric sclerae, no conjunctival  injection.    LUNGS:  Normal respiratory effort on RA.  MUSCULOSKELETAL: Pain on ROM, swelling improved not resolved, erythema resolved,  SKIN:  No acute rashes.          4/17:                   Laboratory Data:     Inflammatory Markers    Recent Labs   Lab Test 04/18/24  0950 04/17/24  0723 04/16/24  0656 04/15/24  1615   CRPI 17.99* 25.40* 37.91* 50.14*       Hematology Studies    Recent Labs   Lab Test 04/18/24  0950 04/17/24  0723 04/16/24  0656 04/15/24  1615   WBC 8.3 8.2 9.3 9.1   HGB 14.7 13.7 13.9 13.6   MCV 91 92 93 91    300 279 275     No lab results found.    Metabolic Studies     Recent Labs   Lab Test 04/16/24  0656 04/13/24  0946 04/12/24  0710 04/11/24  1551    138 137 139   POTASSIUM 4.9 4.6 4.3 4.5   CHLORIDE 105 106 104 102   CO2 25 25 26 27   BUN 20.4 24.2* 22.8 21.9   CR 0.82 0.77 0.84 0.83   GFRESTIMATED 66 71 64 65       Hepatic Studies    Recent Labs   Lab Test 04/11/24  1551   BILITOTAL 0.8   ALKPHOS 141   ALBUMIN 4.0   AST 30   ALT 17            Imaging:   MRI left wrist/hand 4/15/2024   IMPRESSION:  1.  There is an open wound at the dorsal aspect of the wrist with adjacent cellulitis. There is a tiny underlying fluid collection consistent with abscess, and there is fluid and enhancement around the 4th dorsal compartment tendons at the level of the   wrist and proximal metacarpals concerning for septic tenosynovitis. No evidence of septic joint or osteomyelitis.  2.  Multifocal osteoarthrosis.  3.  Full-thickness tear of the TFCC. Positive ulnar variance.    XR left hand 4/11/24  IMPRESSION: No definite radiopaque foreign body is identified. Soft tissue swelling dorsally. No acute fracture or malalignment. Severe STT and fourth DIP joint degenerative changes. Otherwise mild to moderate degenerative changes throughout the hand and   wrist. Osteopenia. Positive ulnar variance.

## 2024-04-29 ENCOUNTER — OFFICE VISIT (OUTPATIENT)
Dept: ORTHOPEDICS | Facility: CLINIC | Age: 89
End: 2024-04-29
Payer: COMMERCIAL
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
## 2024-04-29 PROCEDURE — 99203 OFFICE O/P NEW LOW 30 MIN: CPT | Performed by: PHYSICIAN ASSISTANT

## 2024-04-29 NOTE — PROGRESS NOTES
Hand Surgery History & Physical    REFERRING PHYSICIAN: Evangelina Best   PRIMARY CARE PHYSICIAN: Gabe Etienne     Chief Complaint:   Follow-up cat bite.    History of Present Illness:     Hailey Alexis is a 89 year old right-hand dominant female who presents for follow-up evaluation of left dorsal hand cellulitis after cat bite sustained on 4/11.  Patient was admitted at [place] from 4/11 to 4/16.  She underwent bedside debridement by orthopedics on 4/11.  She was initially treated with IV doxycycline and metronidazole while inpatient, and discharged on oral Cefpodoxime and oral metronidazole.  She was last seen by infectious disease in outpatient clinic on 4/24 with clinical improvement and CRP within normal range.  Reports that while redness and pain has improved, she does continue to have some dorsal wrist swelling, pain with finger range of motion, and weakness with finger extension limited by pain.    Patient lives independently in a condo with her grandson.    Presents today with her son who assist with providing history.    Past Medical History:     Past Medical History:   Diagnosis Date    Cerebrovascular accident (H)     Chronic atrial fibrillation (H)     Hard of hearing        Past Surgical History:   No past surgical history on file.    Social History:     Social History     Tobacco Use    Smoking status: Former     Types: Cigarettes    Smokeless tobacco: Never   Substance Use Topics    Alcohol use: Yes     Alcohol/week: 1.0 standard drink of alcohol     Types: 1 Standard drinks or equivalent per week       Family History:   No family history on file.    Allergies:     Allergies   Allergen Reactions    Penicillin G Rash     Patient reports rash not requiring hospitalization in ~2000. Doesn't remember specifics but no tongue/throat swelling and no difficulty breathing. Reviewed with her 4/14/24.  Tolerated cefpodoxime 4/14/24, ceftriaxone 4/15       Medications:     Current Outpatient  Medications   Medication Sig Dispense Refill    Alendronate Sodium (FOSAMAX PO) Take 70 mg by mouth once a week      calcium-vitamin D (CALTRATE) 600-400 MG-UNIT per tablet Take 2 tablets by mouth daily      cefpodoxime (VANTIN) 200 MG tablet Take 2 tablets (400 mg) by mouth 2 times daily for 13 days 52 tablet 0    ELIQUIS ANTICOAGULANT 2.5 MG tablet Take 1 Tablet (2.5 mg) by mouth two times a day.*      metoprolol succinate ER (TOPROL XL) 25 MG 24 hr tablet Take 1 tablet (25 mg) by mouth daily for 360 days 90 tablet 3    metroNIDAZOLE (FLAGYL) 500 MG tablet Take 1 tablet (500 mg) by mouth 2 times daily for 13 days 26 tablet 0    Multiple Vitamins-Minerals (PRESERVISION AREDS 2) CAPS Take 1 capsule by mouth 2 times daily      multivitamin, therapeutic (THERA-VIT) TABS Take 1 tablet by mouth daily      traZODone (DESYREL) 50 MG tablet Take 1.5 tabs at around 6-7pm (Patient taking differently: Take 25 mg by mouth at bedtime) 135 tablet 1    Vitamin D3 (CHOLECALCIFEROL) 25 mcg (1000 units) tablet Take 1,000 Units by mouth daily       No current facility-administered medications for this visit.        Review of Systems:     A 10 point ROS was performed and reviewed. Specific responses to these questions are noted at the end of the document.    Physical Exam:   Physical Exam Adult:  General: Well-nourished, alert, cooperative with exam and in no acute distress  HEENT: Atraumatic, normocephalic, extraocular movements intact, moist mucous membranes, trachea midline  Pulmonary: Unlabored work of breathing, on room air  Cardiovascular: Warm and well-perfused extremities. 2+ radial pulses  Skin: Warm, intact without rashes to the upper extremities, head or neck   Gait: Normal  Neuro/psych: Oriented to time, place and person. Affect is appropriate    Musculoskeletal: A focused physical examination of the left hand reveals:   Inspection 1 cm longitudinal healing scab over the dorsal wrist, as well as similar transverse over the  dorsal hand.  No erythema.  Moderate edema to the dorsal hand.  Palpation -diffuse tenderness palpation to the area of swelling over the dorsal hand and wrist.  No palpable fluctuance.  Neurovascular- intact light touch sensation and motor to distribution of the median, ulnar and radial nerves. Brisk capillary refill to the distal fingers. 2+ radial pulse.   Range of Motion: Tolerates approximately 40 to 50 degrees of wrist range of motion.  Able to make a full composite fist.  Pain with resisted finger extension.  Extensor lag of the ring finger of approximately 45 degrees.  5 out of 5 strength with resisted finger extension to the index, middle, small finger, weakness with ring finger extension resisted extension.  Muscle strength and tone: 5/5 strength EPL, FPL, APB, first dorsal interosseous.    Assessment and Plan:   Assessment:  89 year old female with cat bite to the left dorsal hand, status post bedside I&D in the ED.  Resolved erythema, persistent edema and pain to the dorsal hand.  CRP last week within normal limits.  Low concern for persistent abscess, cellulitis, extensor tenosynovitis, septic arthritis.  Extensor lag to the ring finger, with pain.      Plan: Recommended referral to hand therapy for range of motion and strengthening of the hand, edema control, other modalities.  She is given an off-the-shelf wrist brace to wear as needed for support.  We did discuss the extensor lag to the ring finger and concern for possible tendon injury versus adhesions, tenosynovitis.  Patient states that she likely would not want any surgical intervention for this should there be a tendon rupture.  Therefore we will hold off on any imaging at this time.  Will trial hand therapy for range of motion and strengthening to see if and get the tendons moving.  Recommended scheduled anti-inflammatories with ibuprofen or Aleve, Tylenol as needed for pain.  Ice and heat as needed.  Finish antibiotics per infectious disease,  she has a follow-up with ID later this week.    Follow-up in 10 to 14 days for recheck. Discussed signs and symptoms of worsening infection that would prompt a return to the clinic or the ED sooner.    Lizet Fields PA-C   Orthopedic Surgery  4/29/2024 7:59 AM

## 2024-04-29 NOTE — NURSING NOTE
DME FITTING    Relevant Diagnosis: Status post left wrist I&D  Wrist brace was fit on patient's Left wrist.     Person(s) involved in teaching:   Son    Brace was applied in standard Manner:  Yes  Brace fit well:  Yes  Patient reports brace to fit comfortably:  Yes    Education:   Patient shown self application and removal of brace: Yes  Patient shown how to adjust brace fit, if necessary: Yes  Patient educated on billing and return policy: Yes  Patient confirmed understanding when and how to contact clinic with concerns: Yes    Ingrid Singh ATC

## 2024-04-29 NOTE — LETTER
4/29/2024         RE: Hailey Alexis  [address]  [place] [id number]        Dear Colleague,    Thank you for referring your patient, Hailey Alexis, to the [facility] [place]. Please see a copy of my visit note below.    Hand Surgery History & Physical    REFERRING PHYSICIAN: Evangelina Best   PRIMARY CARE PHYSICIAN: Gabe Etienne     Chief Complaint:   Follow-up cat bite.    History of Present Illness:     Hailey Alexis is a 89 year old right-hand dominant female who presents for follow-up evaluation of left dorsal hand cellulitis after cat bite sustained on 4/11.  Patient was admitted at [place] from 4/11 to 4/16.  She underwent bedside debridement by orthopedics on 4/11.  She was initially treated with IV doxycycline and metronidazole while inpatient, and discharged on oral Cefpodoxime and oral metronidazole.  She was last seen by infectious disease in outpatient clinic on 4/24 with clinical improvement and CRP within normal range.  Reports that while redness and pain has improved, she does continue to have some dorsal wrist swelling, pain with finger range of motion, and weakness with finger extension limited by pain.    Patient lives independently in a condo with her grandson.    Presents today with her son who assist with providing history.    Past Medical History:     Past Medical History:   Diagnosis Date    Cerebrovascular accident (H)     Chronic atrial fibrillation (H)     Hard of hearing        Past Surgical History:   No past surgical history on file.    Social History:     Social History     Tobacco Use    Smoking status: Former     Types: Cigarettes    Smokeless tobacco: Never   Substance Use Topics    Alcohol use: Yes     Alcohol/week: 1.0 standard drink of alcohol     Types: 1 Standard drinks or equivalent per week       Family History:   No family history on file.    Allergies:     Allergies   Allergen Reactions    Penicillin G  Rash     Patient reports rash not requiring hospitalization in ~2000. Doesn't remember specifics but no tongue/throat swelling and no difficulty breathing. Reviewed with her 4/14/24.  Tolerated cefpodoxime 4/14/24, ceftriaxone 4/15       Medications:     Current Outpatient Medications   Medication Sig Dispense Refill    Alendronate Sodium (FOSAMAX PO) Take 70 mg by mouth once a week      calcium-vitamin D (CALTRATE) 600-400 MG-UNIT per tablet Take 2 tablets by mouth daily      cefpodoxime (VANTIN) 200 MG tablet Take 2 tablets (400 mg) by mouth 2 times daily for 13 days 52 tablet 0    ELIQUIS ANTICOAGULANT 2.5 MG tablet Take 1 Tablet (2.5 mg) by mouth two times a day.*      metoprolol succinate ER (TOPROL XL) 25 MG 24 hr tablet Take 1 tablet (25 mg) by mouth daily for 360 days 90 tablet 3    metroNIDAZOLE (FLAGYL) 500 MG tablet Take 1 tablet (500 mg) by mouth 2 times daily for 13 days 26 tablet 0    Multiple Vitamins-Minerals (PRESERVISION AREDS 2) CAPS Take 1 capsule by mouth 2 times daily      multivitamin, therapeutic (THERA-VIT) TABS Take 1 tablet by mouth daily      traZODone (DESYREL) 50 MG tablet Take 1.5 tabs at around 6-7pm (Patient taking differently: Take 25 mg by mouth at bedtime) 135 tablet 1    Vitamin D3 (CHOLECALCIFEROL) 25 mcg (1000 units) tablet Take 1,000 Units by mouth daily       No current facility-administered medications for this visit.        Review of Systems:     A 10 point ROS was performed and reviewed. Specific responses to these questions are noted at the end of the document.    Physical Exam:   Physical Exam Adult:  General: Well-nourished, alert, cooperative with exam and in no acute distress  HEENT: Atraumatic, normocephalic, extraocular movements intact, moist mucous membranes, trachea midline  Pulmonary: Unlabored work of breathing, on room air  Cardiovascular: Warm and well-perfused extremities. 2+ radial pulses  Skin: Warm, intact without rashes to the upper extremities, head or  neck   Gait: Normal  Neuro/psych: Oriented to time, place and person. Affect is appropriate    Musculoskeletal: A focused physical examination of the left hand reveals:   Inspection 1 cm longitudinal healing scab over the dorsal wrist, as well as similar transverse over the dorsal hand.  No erythema.  Moderate edema to the dorsal hand.  Palpation -diffuse tenderness palpation to the area of swelling over the dorsal hand and wrist.  No palpable fluctuance.  Neurovascular- intact light touch sensation and motor to distribution of the median, ulnar and radial nerves. Brisk capillary refill to the distal fingers. 2+ radial pulse.   Range of Motion: Tolerates approximately 40 to 50 degrees of wrist range of motion.  Able to make a full composite fist.  Pain with resisted finger extension.  Extensor lag of the ring finger of approximately 45 degrees.  5 out of 5 strength with resisted finger extension to the index, middle, small finger, weakness with ring finger extension resisted extension.  Muscle strength and tone: 5/5 strength EPL, FPL, APB, first dorsal interosseous.    Assessment and Plan:   Assessment:  89 year old female with cat bite to the left dorsal hand, status post bedside I&D in the ED.  Resolved erythema, persistent edema and pain to the dorsal hand.  CRP last week within normal limits.  Low concern for persistent abscess, cellulitis, extensor tenosynovitis, septic arthritis.  Extensor lag to the ring finger, with pain.      Plan: Recommended referral to hand therapy for range of motion and strengthening of the hand, edema control, other modalities.  She is given an off-the-shelf wrist brace to wear as needed for support.  We did discuss the extensor lag to the ring finger and concern for possible tendon injury versus adhesions, tenosynovitis.  Patient states that she likely would not want any surgical intervention for this should there be a tendon rupture.  Therefore we will hold off on any imaging at  this time.  Will trial hand therapy for range of motion and strengthening to see if and get the tendons moving.  Recommended scheduled anti-inflammatories with ibuprofen or Aleve, Tylenol as needed for pain.  Ice and heat as needed.  Finish antibiotics per infectious disease, she has a follow-up with ID later this week.    Follow-up in 10 to 14 days for recheck. Discussed signs and symptoms of worsening infection that would prompt a return to the clinic or the ED sooner.    Lizet Fields PA-C   Orthopedic Surgery  4/29/2024 7:59 AM

## 2024-05-02 ENCOUNTER — VIRTUAL VISIT (OUTPATIENT)
Dept: INFECTIOUS DISEASES | Facility: CLINIC | Age: 89
End: 2024-05-02
Attending: STUDENT IN AN ORGANIZED HEALTH CARE EDUCATION/TRAINING PROGRAM
Payer: COMMERCIAL

## 2024-05-02 VITALS
DIASTOLIC BLOOD PRESSURE: 76 MMHG | BODY MASS INDEX: 25.27 KG/M2 | WEIGHT: 148 LBS | SYSTOLIC BLOOD PRESSURE: 127 MMHG | HEIGHT: 64 IN

## 2024-05-02 DIAGNOSIS — Z51.81 THERAPEUTIC DRUG MONITORING: ICD-10-CM

## 2024-05-02 DIAGNOSIS — L03.114 CELLULITIS OF LEFT UPPER EXTREMITY: Primary | ICD-10-CM

## 2024-05-02 DIAGNOSIS — M65.949 TENOSYNOVITIS OF FINGER: ICD-10-CM

## 2024-05-02 PROCEDURE — 99213 OFFICE O/P EST LOW 20 MIN: CPT | Mod: 95 | Performed by: STUDENT IN AN ORGANIZED HEALTH CARE EDUCATION/TRAINING PROGRAM

## 2024-05-02 ASSESSMENT — PAIN SCALES - GENERAL: PAINLEVEL: MODERATE PAIN (5)

## 2024-05-02 NOTE — LETTER
5/2/2024       RE: Hailey Alexis  [address]  [facility] [address]     Dear Colleague,    Thank you for referring your patient, Hailey Alexis, to the [facility] INFECTIOUS DISEASE CLINIC [place] at [facility]. Please see a copy of my visit note below.    Virtual Visit Details    Type of service:  Video Visit   Video Start Time: Approx 332 pm  Video End Time:Approx 345 pm    Originating Location (pt. Location): Home    Distant Location (provider location):  Off-site  Platform used for Video Visit: [facility]          ID CLINIC FOLLOW UP NOTE      Patient:  Hailey Alexis   Date of birth 2/10/1930, Medical record number [id number]  Date of Visit:  5/2/24           Assessment and Recommendations:   ID Problem List:  Infected cat bite c/b septic tenosynovitis of 4th extensor  - Tetanus booster given 4/11/24.  - MRI with tenosynovitis and small abscess  - Per ortho, no plans for surgical intervention  Penicilln allergy - patient reports rash not requiring hospitalization about 20 years ago. Unclear how immediate the rash was. She doesn't remember many of the specifics but is quite sure she had no face/tongue swelling or difficulty breathing and that she did not require a hospital stay for it. Tolerated cefpodoxime challenge 4/14      Discussion:  Pt seen by my ID colleagues in the hospital, here for follow up, inpatient last seen by Miri Agosto PA-C    Hailey Alexis is a 89 yo woman with history of a cat bite to her left hand which developed swelling and pain about 48 hours after the bite. Due to her listed anaphylaxis to penicillin, she was started on an alternative regimen for cat bite of doxycycline and metronidazole. These were given IV. This combo does give overall good bite coverage, though it was suboptimal for streptococci. Since she had slow improvement and her penicillin allergy was not anaphylaxis (see problem list), test dose  of cefpodoxime was given and well tolerated. Transitioned from doxycycline to ceftriaxone on 4/15 for improved strep and gram negative coverage given the slow response.     MRI (4/15, read AM 4/16) with tiny abscess and fluid enhancement around 4th dorsal compartment tendons concerning for septic tenosynovitis.  Ortho evaluated and recommended antibiotics alone without surgical intervention. Redness, swelling, and ROM were continuing to improve during hospitalization. ID suspected strep involvement given more rapid improvement after change to ceftriaxone. Transitioned to PO therapy (cefpodoxiem and flagyl) on 4/17, anticipating she will need a longer 2-3 week course for tenosynovitis without surgical intervention. She was discharged 4/18    Last week I saw her in ID clinic on on Day 9 of expanded regimen, with improved pain and swelling per history and on comparing previous pictures in chart to exam, albeit not completely resolved. We decided to get a CRP to trend (was trending down previosuly) and follow up today2. She had enough antibiotics through May 1, and had ortho follow up planned as well.       Today doinf well, swelling with further improvement,erythema remains resolved,  CRP was down to normal at 4. She has completed 2 weeks of antibitoics. No concerns at ortho visit in interim, has hand OT planned. They have some doxycycline at home that she has not taken, presumably was sent in error given she was on Doxy initially.     Recommendations:  - Antibiotics can be considered complete  - No need to take the Doxycycline  - Follow up with occupational therapy and ortho as scheduled  - No Infectious Disease follow up needed unless new issues or concerns    I have reviewed interval events, vital, labs, images, cultures and antibiotics    Cami Lemus  Staff Physician  Division of Infectious Diseases             Interval History:      Also spoke with grandson virtually. Pt reports swelling better. COmpleettd  Cefpodoxime and flagyl as planned yesterday. They have a bottle of Doxycycline at home that they have not taken         Current Antimicrobials   Current:  - cefpodoxime 4/17-present  - metronidazole 4/11- present    Prior:  - ceftriaxone 4/15- 4/17  - doxycycline 4/11- 4/14  - cefpodoxime challenge dose - tolerated  - clindamycin 4/11  - TMP/SMX 4/11         Physical Exam:       No vitals done for this video visit    Limited video Exam:  GENERAL:  well-developed, well-nourished, sitting in chair in no acute distress.   ENT:  Head is normocephalic, atraumatic.   EYES:  Eyes have anicteric sclerae, no conjunctival injection.    LUNGS:  Normal respiratory effort on RA.  MUSCULOSKELETAL: Swelling appears better,erythema remains resolved  SKIN:  No acute rashes.          4/17:                   Laboratory Data:     Inflammatory Markers    Recent Labs   Lab Test 04/24/24  1705 04/18/24  0950 04/17/24  0723 04/16/24  0656   CRPI 4.37 17.99* 25.40* 37.91*       Hematology Studies    Recent Labs   Lab Test 04/18/24  0950 04/17/24  0723 04/16/24  0656 04/15/24  1615   WBC 8.3 8.2 9.3 9.1   HGB 14.7 13.7 13.9 13.6   MCV 91 92 93 91    300 279 275     No lab results found.    Metabolic Studies     Recent Labs   Lab Test 04/16/24  0656 04/13/24  0946 04/12/24  0710 04/11/24  1551    138 137 139   POTASSIUM 4.9 4.6 4.3 4.5   CHLORIDE 105 106 104 102   CO2 25 25 26 27   BUN 20.4 24.2* 22.8 21.9   CR 0.82 0.77 0.84 0.83   GFRESTIMATED 66 71 64 65       Hepatic Studies    Recent Labs   Lab Test 04/11/24  1551   BILITOTAL 0.8   ALKPHOS 141   ALBUMIN 4.0   AST 30   ALT 17            Imaging:   MRI left wrist/hand 4/15/2024   IMPRESSION:  1.  There is an open wound at the dorsal aspect of the wrist with adjacent cellulitis. There is a tiny underlying fluid collection consistent with abscess, and there is fluid and enhancement around the 4th dorsal compartment tendons at the level of the   wrist and proximal metacarpals  concerning for septic tenosynovitis. No evidence of septic joint or osteomyelitis.  2.  Multifocal osteoarthrosis.  3.  Full-thickness tear of the TFCC. Positive ulnar variance.    XR left hand 4/11/24  IMPRESSION: No definite radiopaque foreign body is identified. Soft tissue swelling dorsally. No acute fracture or malalignment. Severe STT and fourth DIP joint degenerative changes. Otherwise mild to moderate degenerative changes throughout the hand and   wrist. Osteopenia. Positive ulnar variance.

## 2024-05-02 NOTE — PATIENT INSTRUCTIONS
- Antibiotics can be considered complete  - No need to take the Doxycycline  - Follow up with occupational therapy and ortho as scheduled  - No Infectious Disease follow up needed unless new issues or concerns

## 2024-05-02 NOTE — NURSING NOTE
Is the patient currently in the state of [place]? YES    Visit mode:VIDEO    If the visit is dropped, the patient can be reconnected by: VIDEO VISIT: Send to e-mail at: [email or web address]    Will anyone else be joining the visit? NO  (If patient encounters technical issues they should call [phone]    How would you like to obtain your AVS? MyChart    Are changes needed to the allergy or medication list? No    Are refills needed on medications prescribed by this physician? NO    Reason for visit: SAVANNAH Arreola CMA

## 2024-05-02 NOTE — PROGRESS NOTES
Virtual Visit Details    Type of service:  Video Visit   Video Start Time: Approx 332 pm  Video End Time:Approx 345 pm    Originating Location (pt. Location): Home    Distant Location (provider location):  Off-site  Platform used for Video Visit: [facility] CLINIC FOLLOW UP NOTE      Patient:  Hailey Alexis   Date of birth 2/10/1930, Medical record number [id number]  Date of Visit:  5/2/24           Assessment and Recommendations:   ID Problem List:  1. Infected cat bite c/b septic tenosynovitis of 4th extensor  - Tetanus booster given 4/11/24.  - MRI with tenosynovitis and small abscess  - Per ortho, no plans for surgical intervention  2. Penicilln allergy - patient reports rash not requiring hospitalization about 20 years ago. Unclear how immediate the rash was. She doesn't remember many of the specifics but is quite sure she had no face/tongue swelling or difficulty breathing and that she did not require a hospital stay for it. Tolerated cefpodoxime challenge 4/14      Discussion:  Pt seen by my ID colleagues in the hospital, here for follow up, inpatient last seen by Miri Agosto PA-C    Hailey Alexis is a 89 yo woman with history of a cat bite to her left hand which developed swelling and pain about 48 hours after the bite. Due to her listed anaphylaxis to penicillin, she was started on an alternative regimen for cat bite of doxycycline and metronidazole. These were given IV. This combo does give overall good bite coverage, though it was suboptimal for streptococci. Since she had slow improvement and her penicillin allergy was not anaphylaxis (see problem list), test dose of cefpodoxime was given and well tolerated. Transitioned from doxycycline to ceftriaxone on 4/15 for improved strep and gram negative coverage given the slow response.     MRI (4/15, read AM 4/16) with tiny abscess and fluid enhancement around 4th dorsal compartment tendons concerning for septic tenosynovitis.  Ortho  evaluated and recommended antibiotics alone without surgical intervention. Redness, swelling, and ROM were continuing to improve during hospitalization. ID suspected strep involvement given more rapid improvement after change to ceftriaxone. Transitioned to PO therapy (cefpodoxiem and flagyl) on 4/17, anticipating she will need a longer 2-3 week course for tenosynovitis without surgical intervention. She was discharged 4/18    Last week I saw her in ID clinic on on Day 9 of expanded regimen, with improved pain and swelling per history and on comparing previous pictures in chart to exam, albeit not completely resolved. We decided to get a CRP to trend (was trending down previosuly) and follow up today2. She had enough antibiotics through May 1, and had ortho follow up planned as well.       Today doinf well, swelling with further improvement,erythema remains resolved,  CRP was down to normal at 4. She has completed 2 weeks of antibitoics. No concerns at ortho visit in interim, has hand OT planned. They have some doxycycline at home that she has not taken, presumably was sent in error given she was on Doxy initially.     Recommendations:  - Antibiotics can be considered complete  - No need to take the Doxycycline  - Follow up with occupational therapy and ortho as scheduled  - No Infectious Disease follow up needed unless new issues or concerns    I have reviewed interval events, vital, labs, images, cultures and antibiotics    Cami Lemus  Staff Physician  Division of Infectious Diseases             Interval History:      Also spoke with grandson virtually. Pt reports swelling better. COmpleettd Cefpodoxime and flagyl as planned yesterday. They have a bottle of Doxycycline at home that they have not taken         Current Antimicrobials   Current:  - cefpodoxime 4/17-present  - metronidazole 4/11- present    Prior:  - ceftriaxone 4/15- 4/17  - doxycycline 4/11- 4/14  - cefpodoxime challenge dose - tolerated  -  clindamycin 4/11  - TMP/SMX 4/11         Physical Exam:       No vitals done for this video visit    Limited video Exam:  GENERAL:  well-developed, well-nourished, sitting in chair in no acute distress.   ENT:  Head is normocephalic, atraumatic.   EYES:  Eyes have anicteric sclerae, no conjunctival injection.    LUNGS:  Normal respiratory effort on RA.  MUSCULOSKELETAL: Swelling appears better,erythema remains resolved  SKIN:  No acute rashes.          4/17:                   Laboratory Data:     Inflammatory Markers    Recent Labs   Lab Test 04/24/24  1705 04/18/24  0950 04/17/24  0723 04/16/24  0656   CRPI 4.37 17.99* 25.40* 37.91*       Hematology Studies    Recent Labs   Lab Test 04/18/24  0950 04/17/24  0723 04/16/24  0656 04/15/24  1615   WBC 8.3 8.2 9.3 9.1   HGB 14.7 13.7 13.9 13.6   MCV 91 92 93 91    300 279 275     No lab results found.    Metabolic Studies     Recent Labs   Lab Test 04/16/24  0656 04/13/24  0946 04/12/24  0710 04/11/24  1551    138 137 139   POTASSIUM 4.9 4.6 4.3 4.5   CHLORIDE 105 106 104 102   CO2 25 25 26 27   BUN 20.4 24.2* 22.8 21.9   CR 0.82 0.77 0.84 0.83   GFRESTIMATED 66 71 64 65       Hepatic Studies    Recent Labs   Lab Test 04/11/24  1551   BILITOTAL 0.8   ALKPHOS 141   ALBUMIN 4.0   AST 30   ALT 17            Imaging:   MRI left wrist/hand 4/15/2024   IMPRESSION:  1.  There is an open wound at the dorsal aspect of the wrist with adjacent cellulitis. There is a tiny underlying fluid collection consistent with abscess, and there is fluid and enhancement around the 4th dorsal compartment tendons at the level of the   wrist and proximal metacarpals concerning for septic tenosynovitis. No evidence of septic joint or osteomyelitis.  2.  Multifocal osteoarthrosis.  3.  Full-thickness tear of the TFCC. Positive ulnar variance.    XR left hand 4/11/24  IMPRESSION: No definite radiopaque foreign body is identified. Soft tissue swelling dorsally. No acute fracture or  malalignment. Severe STT and fourth DIP joint degenerative changes. Otherwise mild to moderate degenerative changes throughout the hand and   wrist. Osteopenia. Positive ulnar variance.

## 2024-05-03 ENCOUNTER — THERAPY VISIT (OUTPATIENT)
Dept: OCCUPATIONAL THERAPY | Facility: CLINIC | Age: 89
End: 2024-05-03
Payer: COMMERCIAL

## 2024-05-03 DIAGNOSIS — M79.642 PAIN OF LEFT HAND: Primary | ICD-10-CM

## 2024-05-03 DIAGNOSIS — L03.114 CELLULITIS OF LEFT UPPER EXTREMITY: ICD-10-CM
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
## 2024-05-03 PROCEDURE — 97165 OT EVAL LOW COMPLEX 30 MIN: CPT | Mod: GO | Performed by: OCCUPATIONAL THERAPIST

## 2024-05-03 PROCEDURE — 97535 SELF CARE MNGMENT TRAINING: CPT | Mod: GO | Performed by: OCCUPATIONAL THERAPIST

## 2024-05-03 PROCEDURE — 97110 THERAPEUTIC EXERCISES: CPT | Mod: GO | Performed by: OCCUPATIONAL THERAPIST

## 2024-05-03 NOTE — PROGRESS NOTES
OCCUPATIONAL THERAPY EVALUATION  Type of Visit: Evaluation    See electronic medical record for Abuse and Falls Screening details.    Subjective      Presenting condition or subjective complaint:    Date of onset: 04/09/24    Relevant medical history:     Past Medical History:   Diagnosis Date    Cerebrovascular accident (H)     Chronic atrial fibrillation (H)     Hard of hearing       Dates & types of surgery:   No past surgical history on file.     Prior diagnostic imaging/testing results:       Prior therapy history for the same diagnosis, illness or injury:        Prior Level of Function  Transfers: Assistive equipment  Ambulation: Assistive equipment  ADL: Assistive equipment and person  IADL:  Ind    Living Environment  Social support:     Type of home:     Stairs to enter the home:         Ramp:     Stairs inside the home:         Help at home:    Equipment owned:       Employment:      Hobbies/Interests:      Patient goals for therapy:      Pain assessment: Pain present     Per chart review: DOI 4/9/2024, office visit 4/11/24 cat bite over L dorsal hand with c/0 increased pain/swelling with decreased ROM was referred to ED for tx admitted 4/11-4/16 with I&D performed. MRI:  There is an open wound dorsal to the wrist with a tiny underlying area of absent enhancement suggesting a small abscess. There is fluid and enhancement around the underlying 4th dorsal compartment tendons, likely septic tenosynovitis in this setting. There is no evidence of underlying myositis, septic joint, or osteomyelitis.  Multifocal osteoarthrosis. Positive ulnar variance and TFCC tear.  Saw infectious disease on 4/24 reported it looked better and will continue antibiotics till 5/1/2024. Office visit with Lizet on 4/29/2024 with c/o pain with ROM, finger extension weakness, and a noted L RF extensor lag. Given wrist OTC brace for comfort.     Objective   ADDITIONAL HISTORY:  Right hand dominant  Patient reports symptoms of pain,  stiffness/loss of motion, weakness/loss of strength, and edema  Transportation: family support  Currently retired    PAIN:  Pain Level at Rest: 2/10  Pain Level with Use: 5/10  Pain Location: over dosal hand where bit was  Pain Quality: Aching, Dull, Exhausting, Pressure, and Tender  Pain Frequency: constant  Pain is Worst: daytime  Pain is Exacerbated By: with use   Pain is Relieved By: rest otc medication  Pain Progression: Improved    EDEMA: Moderate  See picture       SCAR/WOUND:     SENSATION: WNL throughout all nerve distributions; per patient report     ROM:   Wrist ROM  Left AROM Right AROM    Extension 45 62   Flexion 35 60   Radial Deviation (RD) 15 17   Ulnar Deviation (UD) 15 30   Supination 75 75   Pronation 90 90       Hand ROM  Left AROM Right AROM    Index MP  -45/75   PIP  0/85   DIP  0//40   Total Active Motion     Long MP  -45/75   PIP  0//85   DIP  0//40   Total Active Motion     RING MP  -45/75   PIP  0//85   DIP  0//45   Total Active Motion     Small MP  -45/75   PIP  0//85   DIP  0//75   Total Active Motion         OBSERVATIONS/APPEARANCE:  Noted difficulty coming into finger extension is able to place them in -45 and hold there      RESISTED TESTING: Resisted Testing (pain report)   Left Right   Supination  NT ++ 5   Pronation NT 5   Wrist Ext  2+ 5   Wrist Flx 3 5   Finger Ext 2- ++ 5   Finger Flex 4 5      STRENGTH:     Measured in pounds 5/3/2024 5/3/2024    Left Right   Trial 1 8 20     Lateral Pinch  Measured in pounds 5/3/2024 5/3/2024    Left Right   Trial 1 1 4     3 Point Pinch  Measured in pounds 5/3/2024 5/3/2024    Left Right   Trial 1 1 3       PALPATION:   TTP over dorsal metacarpals and hand       Assessment & Plan   CLINICAL IMPRESSIONS  Medical Diagnosis: Cat Bite    Treatment Diagnosis: L hand swelling and pain    Impression/Assessment: Pt is a 89 year old female presenting to Occupational Therapy due to L Hand Cat Bite with cellulitis.  The following significant  findings have been identified: Impaired ROM, Impaired strength, and Pain.  These identified deficits interfere with their ability to perform self care tasks, household chores, household mobility, and meal planning and preparation as compared to previous level of function.     Clinical Decision Making (Complexity):  Assessment of Occupational Performance: 3-5 Performance Deficits  Occupational Performance Limitations: dressing, functional mobility, home establishment and management, and meal preparation and cleanup  Clinical Decision Making (Complexity): Low complexity    PLAN OF CARE  Treatment Interventions:  Modalities:  US, Paraffin, and TENS  Therapeutic Exercise:  AROM, AAROM, PROM, Tendon Gliding, Blocking, Reverse Blocking, Place and Hold, Contract Relax, Extensor Tracking, Isotonics, Isometrics, and Stabilization  Neuromuscular re-education:  Nerve Gliding, Coordination/Dexterity, Sensory re-education, Desensitization, Proprioceptive Training, Kinesiotaping, Isometrics, and Stabilization  Manual Techniques:  Coordination/Dexterity, Joint mobilization, Scar mobilization, Friction massage, Myofascial release, and Manual edema mobilization  Orthotic Fabrication:  Static and Static progressive  Self Care:  Self Care Tasks, Ergonomic Considerations, and Work Tasks    Long Term Goals   OT Goal 1  Goal Identifier:   Goal Description: Pt will complete  and use rollator fro 15 mins with 0/10 painr reported  Rationale: In order to maximize safety and independence with performance of self-care activities  Goal Progress: Initial  Target Date: 07/03/24      Frequency of Treatment: 1x a week  Duration of Treatment: 8 weeks     Recommended Referrals to Other Professionals:   Education Assessment: Learner/Method: Patient;Demonstration;Pictures/Video;No Barriers to Learning     Risks and benefits of evaluation/treatment have been explained.   Patient/Family/caregiver agrees with Plan of Care.     Evaluation Time:     OT Liliyaal, Low Complexity Minutes (63322): 18       Signing Clinician: RYAN Navarro [facility]                                                                                   OUTPATIENT OCCUPATIONAL THERAPY      PLAN OF TREATMENT FOR OUTPATIENT REHABILITATION   Patient's Last Name, First Name, Hailey Atkins YOB: 1930   Provider's Name   [facility]   Medical Record No.  [id number]     Onset Date: 04/09/24 Start of Care Date: 05/03/24     Medical Diagnosis:  Cat Bite      OT Treatment Diagnosis:  L hand swelling and pain Plan of Treatment  Frequency/Duration:1x a week/8 weeks    Certification date from 05/03/24   To 07/03/24        See note for plan of treatment details and functional goals     RYAN Navarro                         I CERTIFY THE NEED FOR THESE SERVICES FURNISHED UNDER        THIS PLAN OF TREATMENT AND WHILE UNDER MY CARE     (Physician attestation of this document indicates review and certification of the therapy plan).              Referring Provider:  Weston Hood    Initial Assessment  See Epic Evaluation- 05/03/24

## 2024-05-06 ENCOUNTER — THERAPY VISIT (OUTPATIENT)
Dept: OCCUPATIONAL THERAPY | Facility: CLINIC | Age: 89
End: 2024-05-06
Attending: STUDENT IN AN ORGANIZED HEALTH CARE EDUCATION/TRAINING PROGRAM
Payer: COMMERCIAL

## 2024-05-06 DIAGNOSIS — M79.642 PAIN OF LEFT HAND: Primary | ICD-10-CM

## 2024-05-06 PROCEDURE — 97535 SELF CARE MNGMENT TRAINING: CPT | Mod: GO | Performed by: OCCUPATIONAL THERAPIST

## 2024-05-06 PROCEDURE — 97110 THERAPEUTIC EXERCISES: CPT | Mod: GO | Performed by: OCCUPATIONAL THERAPIST

## 2024-05-13 ENCOUNTER — THERAPY VISIT (OUTPATIENT)
Dept: OCCUPATIONAL THERAPY | Facility: CLINIC | Age: 89
End: 2024-05-13
Attending: STUDENT IN AN ORGANIZED HEALTH CARE EDUCATION/TRAINING PROGRAM
Payer: COMMERCIAL

## 2024-05-13 DIAGNOSIS — M79.642 PAIN OF LEFT HAND: Primary | ICD-10-CM

## 2024-05-13 PROCEDURE — 97110 THERAPEUTIC EXERCISES: CPT | Mod: GO | Performed by: OCCUPATIONAL THERAPIST

## 2024-05-13 PROCEDURE — 97140 MANUAL THERAPY 1/> REGIONS: CPT | Mod: GO | Performed by: OCCUPATIONAL THERAPIST

## 2024-05-13 NOTE — PROGRESS NOTES
05/13/24 0500   Appointment Info   Treating Provider RAUL Mcdermott OTR/L   Total/Authorized Visits 8 POC   Visits Used 3   Medical Diagnosis Cat Bite   OT Tx Diagnosis L hand swelling and pain   Quick Add  Certification   Progress Note/Certification   Start Of Care Date 05/03/24   Onset of Illness/Injury or Date of Surgery 04/09/24   Therapy Frequency 1x a week   Predicted Duration 8 weeks   Certification date from 05/03/24   Certification date to 07/03/24   Progress Note Due Date 07/03/24   Progress Note Completed Date 05/03/24   OT Goal 1   Goal Identifier    Goal Description Pt will complete  and use rollator fro 15 mins with 0/10 painr reported   Rationale In order to maximize safety and independence with performance of self-care activities   Goal Progress Initial   Target Date 07/03/24   Subjective Report   Subjective Report Pt reported low grade aching o f hand   Objective Measures   Objective Measures Objective Measure 1;Objective Measure 2;Objective Measure 3;Hand Obj Measures   Hand Objective Measures ROM;Strength;Resisted Testing   Resisted Testing Pronation;Supination   ROM Hand ROM   Strength ;Lateral Pinch;3 Point Pinch   Objective Measure 1   Objective Measure Pain   Details 3/10 at most   Objective Measure 2   Objective Measure Finger Flex MMT   Details 4/5   Objective Measure 3   Objective Measure Finger Ext MMT   Details 4/5   Hand ROM    Index MP -20   Long MP -20   Ring MP -20   Small MP -5   Resisted Testing   Supination  4/5   Pronation 4/5    (measured in pounds)    Average Strength R29 L16   Lateral Pinch (measured in pounds)   Lateral Pinch Average Strength R4 L2   3 Point Pinch (measured in pounds)   3 Point Pinch Average Strength R5 L3   Treatment Interventions (OT)   Interventions Therapeutic Procedure/Exercise;Therapeutic Activity;Manual Therapy   Self Care/Home Management   Self-Care/Home Mgmt/ADL, Compensatory, Meal Prep Minutes (06996) 5 Minutes   Self Care 1  Education   Self Care 1 - Details continuing HEP and return to all activities at home   Patient Response/Progress verbalized understanding with son present   Therapeutic Procedure/Exercise   Therapeutic Procedure: strength, endurance, ROM, flexibillity minutes (07079) 15   Therapeutic Procedures Ther Proc 2;Ther Proc 3;Ther Proc 4;Ther Proc 5;Ther Proc 6;Ther Proc 7;Ther Proc 8   Ther Proc 1 Finger Active Range of Motion Individual Finger Extension   Ther Proc 1 - Details 1x10 HEP   Ther Proc 2 Wrist Active Range of Motion Extension   Ther Proc 2 - Details 1x10 HEP   Ther Proc 3 Wrist Active Range of Motion Ulnar Deviation   Ther Proc 3 - Details 1x10 HEP   Ther Proc 4 Finger Active Range of Motion Gravity Assisted Extension   Ther Proc 4 - Details 1x10 HEP   Skilled Intervention Mod verbal and visual cues   Patient Response/Progress tolerated well no pain   Ther Proc 5 Hand Strengthening Gripping   Ther Proc 5 - Details 1x10 HEP pink sponge   Ther Proc 6 Hand Strengthening Key Pinch   Ther Proc 6 - Details 1x10 HEP pink sponge   Ther Proc 7 Hand Strengthening 3 Point Pinch   Ther Proc 7 - Details 1x10 HEP pink sponge   Ther Proc 8 Intrinsic Strengthening Finger Abduction   Ther Proc 8 - Details rubberband 1x10 HEP   Therapeutic Activity   Ther Act 1 Finger Flicks   Ther Act 1 - Details 15 each finger wwith rest breaks noted improvement in all finger ext post activity   Skilled Intervention verbal cues   Patient Response/Progress tolerated fair   Manual Therapy   Manual Therapy Minutes (33719) 8   Manual Therapy 1 STM   Manual Therapy 1 - Details to hand intrisics prior to AROM and Strengthening   Education   Learner/Method Patient;Demonstration;Pictures/Video;No Barriers to Learning   Plan   Home program Provided   Updates to plan of care None   Plan for next session D/C   Total Session Time   Timed Code Treatment Minutes 28   Total Treatment Time (sum of timed and untimed services) 28         DISCHARGE  Reason for  Discharge: Patient has met all goals.    Equipment Issued: None     Discharge Plan: Patient to continue home program.    Referring Provider:  Weston Hood

## 2024-05-14 ENCOUNTER — DOCUMENTATION ONLY (OUTPATIENT)
Dept: AUDIOLOGY | Facility: CLINIC | Age: 89
End: 2024-05-14
Payer: COMMERCIAL

## 2024-05-14 DIAGNOSIS — H90.3 SENSORY HEARING LOSS, BILATERAL: Primary | ICD-10-CM

## 2024-05-14 PROCEDURE — V5020 CONFORMITY EVALUATION: HCPCS

## 2024-05-14 PROCEDURE — V5011 HEARING AID FITTING/CHECKING: HCPCS

## 2024-05-14 PROCEDURE — V5261 HEARING AID, DIGIT, BIN, BTE: HCPCS

## 2024-05-14 PROCEDURE — V5299 HEARING SERVICE: HCPCS

## 2024-05-14 PROCEDURE — V5264 EAR MOLD/INSERT: HCPCS

## 2024-05-14 PROCEDURE — V5160 DISPENSING FEE BINAURAL: HCPCS

## 2024-05-14 NOTE — PROGRESS NOTES
New bilateral set of hearing aids and earmolds as well as a replacement left hearing aid and earmold billed today.  Hearing aids were programmed in the fitting software and settings were verified in the test box by Dr. Vargas.    I delegated the hearing aid service to the audiology assistant. I approve of the services provided. Prio to services provided by assistant simulated real-ear-probe-microphone measurements were completed on the [employer] system and were a good match to NAL-NL1 target with soft sounds audible, moderate sounds comfortable, and loud sounds below discomfort. UCLs are verified through maximum power output measures and demonstrate appropriate limiting of loud inputs.    Tang Blair, [facility]  Licensed Audiologist  [place] License #[id number]

## 2024-05-17 PROBLEM — M79.642 PAIN OF LEFT HAND: Status: RESOLVED | Noted: 2024-05-03 | Resolved: 2024-05-17

## 2024-06-01 ENCOUNTER — HEALTH MAINTENANCE LETTER (OUTPATIENT)
Age: 89
End: 2024-06-01

## 2024-06-11 ENCOUNTER — OFFICE VISIT (OUTPATIENT)
Dept: FAMILY MEDICINE | Facility: CLINIC | Age: 89
End: 2024-06-11
Payer: COMMERCIAL

## 2024-06-11 VITALS
DIASTOLIC BLOOD PRESSURE: 63 MMHG | OXYGEN SATURATION: 96 % | SYSTOLIC BLOOD PRESSURE: 135 MMHG | HEART RATE: 69 BPM | RESPIRATION RATE: 16 BRPM | TEMPERATURE: 97.2 F

## 2024-06-11 DIAGNOSIS — L03.114 CELLULITIS OF LEFT UPPER EXTREMITY: ICD-10-CM
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
## 2024-06-11 RX ORDER — CEFUROXIME AXETIL 500 MG/1
500 TABLET ORAL 2 TIMES DAILY
Qty: 6 TABLET | Refills: 0 | Status: CANCELLED | OUTPATIENT
Start: 2024-06-11 | End: 2024-06-14

## 2024-06-11 RX ORDER — METRONIDAZOLE 500 MG/1
500 TABLET ORAL 2 TIMES DAILY
Qty: 6 TABLET | Refills: 0 | Status: SHIPPED | OUTPATIENT
Start: 2024-06-11 | End: 2024-06-14

## 2024-06-11 RX ORDER — CEFPODOXIME PROXETIL 200 MG/1
400 TABLET, FILM COATED ORAL 2 TIMES DAILY
Qty: 12 TABLET | Refills: 0 | Status: SHIPPED | OUTPATIENT
Start: 2024-06-11 | End: 2024-06-14

## 2024-06-11 NOTE — NURSING NOTE
89 year old  Chief Complaint   Patient presents with    Animal Bite     Cat bite that occurred at 12:30 this afternoon. Put mupirocin on it, and band aid.       Blood pressure 135/63, pulse 69, temperature 97.2  F (36.2  C), temperature source Temporal, resp. rate 16, SpO2 96%. There is no height or weight on file to calculate BMI.  Patient Active Problem List   Diagnosis    Cerebrovascular accident (H)    Chronic atrial fibrillation (H)    Hard of hearing    Hip pain, right    Cellulitis of left upper extremity    Cat bite, initial encounter       Wt Readings from Last 2 Encounters:   05/02/24 67.1 kg (148 lb)   04/24/24 66.2 kg (146 lb)     BP Readings from Last 3 Encounters:   06/11/24 135/63   05/02/24 127/76   04/24/24 127/76         Current Outpatient Medications   Medication Sig Dispense Refill    Alendronate Sodium (FOSAMAX PO) Take 70 mg by mouth once a week      calcium-vitamin D (CALTRATE) 600-400 MG-UNIT per tablet Take 2 tablets by mouth daily      ELIQUIS ANTICOAGULANT 2.5 MG tablet Take 1 Tablet (2.5 mg) by mouth two times a day.*      metoprolol succinate ER (TOPROL XL) 25 MG 24 hr tablet Take 1 tablet (25 mg) by mouth daily for 360 days 90 tablet 3    Multiple Vitamins-Minerals (PRESERVISION AREDS 2) CAPS Take 1 capsule by mouth 2 times daily      multivitamin, therapeutic (THERA-VIT) TABS Take 1 tablet by mouth daily      traZODone (DESYREL) 50 MG tablet Take 1.5 tabs at around 6-7pm (Patient taking differently: Take 25 mg by mouth at bedtime) 135 tablet 1    Vitamin D3 (CHOLECALCIFEROL) 25 mcg (1000 units) tablet Take 1,000 Units by mouth daily       No current facility-administered medications for this visit.       Social History     Tobacco Use    Smoking status: Former     Types: Cigarettes    Smokeless tobacco: Never   Substance Use Topics    Alcohol use: Yes     Alcohol/week: 1.0 standard drink of alcohol     Types: 1 Standard drinks or equivalent per week       Health Maintenance Due   Topic  "Date Due    ADVANCE CARE PLANNING  Never done    FALL RISK ASSESSMENT  Never done    IPV IMMUNIZATION (2 of 3 - Adult catch-up series) 06/13/2006    MEDICARE ANNUAL WELLNESS VISIT  02/23/2012    COVID-19 Vaccine (7 - 2023-24 season) 01/29/2024       No results found for: \"PAP\"      June 11, 2024 2:40 PM    "

## 2024-06-11 NOTE — PROGRESS NOTES
Assessment & Plan     Jenn was seen today for animal bite.    Diagnoses and all orders for this visit:    Cat bite, initial encounter  -     metroNIDAZOLE (FLAGYL) 500 MG tablet; Take 1 tablet (500 mg) by mouth 2 times daily for 3 days  -     cefpodoxime (VANTIN) 200 MG tablet; Take 2 tablets (400 mg) by mouth 2 times daily for 3 days    Cellulitis of left upper extremity      Cat bite with recent hospital admission for similar.  Given puncture wound and location, recommend antibiotic prophylaxis.  Tetanus UTD  3 days cefpodoxime and Flagyl ordered. These were ID recs from last infection given penicillin allergy. Warning signs and return precautions were discussed with patient and grandson.      Subjective   Jenn is a 89 year old, presenting for the following health issues:  Animal Bite (Cat bite that occurred at 12:30 this afternoon. Put mupirocin on it, and band aid.)    HPI     Bit by cat today.  12:30  Playing with cat, bit the arm  No significant bleeding  No pain right now.    Washed it off, put mupirocin ointment on it.    She had a recent hospitalization (4/2024) due to a cat bite of the left hand which resulted in cellulitis, tenosynovitis and abscess.      Objective    /63 (BP Location: Left arm, Patient Position: Sitting, Cuff Size: Adult Large)   Pulse 69   Temp 97.2  F (36.2  C) (Temporal)   Resp 16   SpO2 96%   There is no height or weight on file to calculate BMI.  Physical Exam   General: Well-appearing in NAD   HEENT: Normocephalic, atraumatic. Mucus membranes moist.   Resp: No respiratory distress   MSK: No peripheral edema.   Skin: There is a puncture wound over the radial ventral side of the right forearm, approximately 2 mm in length. There is no palpable foreign body in the wound. The wound is clean. The wound is over the flexor tendons of the forearm but ROM is intact in the wrist and elbow.  Psych: Appropriate affect. Mood is good       Signed Electronically by: Ayanna Dailey,  MD

## 2024-06-17 RX ORDER — ALENDRONATE SODIUM 70 MG/1
70 TABLET ORAL WEEKLY
Status: CANCELLED | OUTPATIENT
Start: 2024-06-17

## 2024-06-19 NOTE — TELEPHONE ENCOUNTER
Historical med, not ever prescribed by Dr. Etienne. Sent message to pt/son for more context.    Cesar WILCOX, RN  06/19/24 8:31 AM

## 2024-08-23 ENCOUNTER — TELEPHONE (OUTPATIENT)
Dept: FAMILY MEDICINE | Facility: CLINIC | Age: 89
End: 2024-08-23

## 2024-08-23 NOTE — TELEPHONE ENCOUNTER
Sunil had some concerns about Jenn travelling without the new covid booster. He is wondering if it is safe to do so without it and if she will be fine travelling on a train and plane.

## 2024-08-23 NOTE — TELEPHONE ENCOUNTER
Spoke with Agus and discussed inherent risk of Covid exposure during travel. Explained that updated Covid vaccines will most likely be released over the next few weeks.    Cesar WILCOX, RN  08/23/24 3:57 PM

## 2024-09-25 DIAGNOSIS — M81.0 AGE-RELATED OSTEOPOROSIS WITHOUT CURRENT PATHOLOGICAL FRACTURE: Primary | ICD-10-CM

## 2024-09-25 RX ORDER — ALENDRONATE SODIUM 70 MG/1
70 TABLET ORAL WEEKLY
Qty: 12 TABLET | Refills: 2 | Status: SHIPPED | OUTPATIENT
Start: 2024-09-25

## 2024-09-25 RX ORDER — ANTIOX #8/OM3/DHA/EPA/LUT/ZEAX 250-2.5 MG
1 CAPSULE ORAL DAILY
Qty: 90 CAPSULE | Refills: 3 | Status: SHIPPED | OUTPATIENT
Start: 2024-09-25

## 2024-09-25 NOTE — TELEPHONE ENCOUNTER
Alendronate (Fosamax) 70 mg, Calcium carbonate-Vitamin D (Caltrate) 600-400 mg + Multiple Vitamin     Last Office Visit: 6/11/24  Future [facility] Appointments: None  Medication last refilled:     Medication last refilled:   Historical - Alendronate  Historical - Caltrate  Historical - MVI    Required labs per protocol:    LAB REF RANGE 1/30/23 4/16/24   GFR >60 mL/min/1.73m2  >60 66   Creatinine 0.67-1.17 mg/dL 0.75 0.82     Required Imaging per protocol:  Last Dexa Scan:  5/16/19    Routing refill request to provider for review/approval because:  Medication is reported/historical  Dexa scan not current    ELIZABETH LeeN, RN, CCM

## 2024-09-25 NOTE — TELEPHONE ENCOUNTER
Rx request/med refill (route to triage team)    Check chart first to see if refill has already been sent to pharmacy!    Who is calling -  [facility]  Full medication name and dosage - Alendronate Sodium (FOSAMAX PO)   calcium-vitamin D (CALTRATE) 600-400 MG-UNIT per tablet   Multiple Vitamins-Minerals (PRESERVISION AREDS 2) CAPS   Pharmacy name and location -     [facility] -- [place], [place] - [address]  [address]  [facility] [address]  Phone: [phone] Fax: [phone]   Advised patient that it may take up to 3 business days to complete? Yes  Ok to leave a message on VM?Yes

## 2024-09-26 DIAGNOSIS — G47.09 OTHER INSOMNIA: ICD-10-CM

## 2024-09-26 RX ORDER — DIGOXIN 125 MCG
125 TABLET ORAL DAILY
Qty: 90 TABLET | Refills: 0 | OUTPATIENT
Start: 2024-09-26

## 2024-09-26 RX ORDER — TRAZODONE HYDROCHLORIDE 50 MG/1
25 TABLET, FILM COATED ORAL AT BEDTIME
Qty: 45 TABLET | Refills: 0 | Status: SHIPPED | OUTPATIENT
Start: 2024-09-26

## 2024-10-05 ENCOUNTER — MYC REFILL (OUTPATIENT)
Dept: FAMILY MEDICINE | Facility: CLINIC | Age: 89
End: 2024-10-05

## 2024-10-05 DIAGNOSIS — I48.20 CHRONIC ATRIAL FIBRILLATION (H): Primary | ICD-10-CM

## 2024-10-07 RX ORDER — APIXABAN 2.5 MG/1
2.5 TABLET, FILM COATED ORAL 2 TIMES DAILY
Qty: 60 TABLET | Refills: 0 | Status: SHIPPED | OUTPATIENT
Start: 2024-10-07 | End: 2024-10-29

## 2024-10-10 ENCOUNTER — TELEPHONE (OUTPATIENT)
Dept: FAMILY MEDICINE | Facility: CLINIC | Age: 89
End: 2024-10-10

## 2024-10-10 DIAGNOSIS — I48.20 CHRONIC ATRIAL FIBRILLATION (H): ICD-10-CM

## 2024-10-10 DIAGNOSIS — I63.9 CEREBROVASCULAR ACCIDENT (CVA), UNSPECIFIED MECHANISM (H): ICD-10-CM
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
## 2024-10-10 DIAGNOSIS — L03.114 CELLULITIS OF LEFT UPPER EXTREMITY: ICD-10-CM

## 2024-10-10 DIAGNOSIS — Z91.81 HISTORY OF RECENT FALL: Primary | ICD-10-CM

## 2024-10-10 DIAGNOSIS — R26.89 POOR BALANCE: ICD-10-CM

## 2024-10-10 DIAGNOSIS — H91.90 HARD OF HEARING: ICD-10-CM

## 2024-10-10 NOTE — TELEPHONE ENCOUNTER
Called son, Agus, and he reports his mother Jenn has had a couple falls recently and has poor balance.  He is concerned if they have all the possible safety equipment in place (grab bars, etc) that they could to help her be as safe as possible and stay in the home.  She has trouble getting dressed independently.  Will place a home care referral for RN and OT assessments through [employer].  Told Agus that I will place his phone number as the primary contact for them to call and schedule an appointment.  JERI Lee, RN, [facility]  RN Care Coordinator  [facility]  10/10/24  10:47 AM  Phone: [phone]

## 2024-10-11 PROBLEM — M25.551 HIP PAIN, RIGHT: Status: RESOLVED | Noted: 2023-10-11 | Resolved: 2024-10-11

## 2024-10-14 NOTE — TELEPHONE ENCOUNTER
Face-to-Face documented, so 6/11/24 note, [facility] at Fax:  [phone].  ELIZABETH LeeN, RN, CCM  RN Care Coordinator  [facility]  10/14/24  11:03 AM  Phone: [phone]

## 2024-10-14 NOTE — TELEPHONE ENCOUNTER
----- Message from Katy WATTS sent at 10/10/2024 10:49 AM CDT -----  Regarding: Face to face addendum  Dalton Urena,  You saw Jenn (89-year old of [place]) in June for a cat bite.  The son is asking for a home Rn and OT eval for home safety (she has terrible balance, has fallen once and needs ADL education + adaptive equipment) and ADL training.  Are you comfortable adding a face-to-face to your June note for this?  It has to be from a visit within the last 6 months.  Otherwise, I'll have to schedule her for another visit to discuss it.  Let me know.  Thanks.  Katy

## 2024-10-15 ENCOUNTER — TELEPHONE (OUTPATIENT)
Dept: FAMILY MEDICINE | Facility: CLINIC | Age: 89
End: 2024-10-15

## 2024-10-15 NOTE — TELEPHONE ENCOUNTER
Returned call to Aliza and left voice message confirming verbal orders as requested for delay start of care until 10/17/24 for OT and skilled nursing.    ELIZABETH LeonN, RN  10/15/24, 3:38 PM

## 2024-10-15 NOTE — TELEPHONE ENCOUNTER
Home Care Verbal Orders    Caller Name:Kumar but left number for Aliza [phone]  Agency: [facility]     Orders Requested:   Occupational Therapy (OT) Request verbal delay orders for start of care to 10/17,  Skilled Nursing (SN) Requesting verbal delay orders for start of care to 10/17

## 2024-10-16 ENCOUNTER — TELEPHONE (OUTPATIENT)
Dept: FAMILY MEDICINE | Facility: CLINIC | Age: 89
End: 2024-10-16

## 2024-10-16 NOTE — TELEPHONE ENCOUNTER
Called MANNY Salinas, with [facility], at [phone], and left a message giving verbal authorization for:    Skilled Nursing (SN) Once a week for nine weeks, with three as needed for medication complications,,  Home Health Aid (HHA) Once a week for nine weeks. PT and OT and Social Work once a week for one week for evaluation and treat. Speech evaluation once a week for one week.      Ok to take:  Women's One A Day Multivitamin   Glucosamine with MSM 1500mg   MSM 1000mg  Tylenol  Ibuprofen    JERI Lee, RN, [facility]  RN Care Coordinator  [facility]  10/16/24  3:31 PM  Phone: [phone]

## 2024-10-16 NOTE — TELEPHONE ENCOUNTER
Home Care Verbal Orders    Caller Name: Brad  Agency: [facility]    Orders Requested:   Skilled Nursing (SN) Once a week for nine weeks, with three as needed for medication complications,,  Home Health Aid (HHA) Once a week for nine weeks. PT and OT and Social Work once a week for one week for evaluation and treat. Speech evaluation once a week for one week.     Ok to take:  Women's One A Day Multivitamin   Glucosamine with MSM 1500mg   MSM 1000mg  Tylenol  Ibuprofen    Ok to leave a voicemail.

## 2024-10-17 ENCOUNTER — TELEPHONE (OUTPATIENT)
Dept: FAMILY MEDICINE | Facility: CLINIC | Age: 89
End: 2024-10-17

## 2024-10-17 NOTE — TELEPHONE ENCOUNTER
Called ST Pollo, with [employer], at [phone], and left a message giving authorization to delay start of care for ST wooten until Tuesday, 10/22/24.    ELIZABETH LeeN, RN, [facility]  RN Care Coordinator  [facility]  10/17/24  2:52 PM  Phone: [phone]

## 2024-10-17 NOTE — TELEPHONE ENCOUNTER
Home Care Verbal Orders    Caller Name:Pollo Speech therapist [phone]  [facility]     Orders Requested:   Other Needs ok to delay speech evaluation to Tuesday, Oct 22nd

## 2024-10-22 ENCOUNTER — TELEPHONE (OUTPATIENT)
Dept: FAMILY MEDICINE | Facility: CLINIC | Age: 89
End: 2024-10-22

## 2024-10-22 NOTE — TELEPHONE ENCOUNTER
Home Care Verbal Orders    Caller Name:Pollo CROCKETT  Agency: [facility]     Orders Requested:   Other Speech Therapy: Once a week for three weeks for cognition. Ok to leave a voicemail.

## 2024-10-23 ENCOUNTER — DOCUMENTATION ONLY (OUTPATIENT)
Dept: ANTICOAGULATION | Facility: CLINIC | Age: 89
End: 2024-10-23
Payer: COMMERCIAL

## 2024-10-23 ENCOUNTER — TELEPHONE (OUTPATIENT)
Dept: FAMILY MEDICINE | Facility: CLINIC | Age: 89
End: 2024-10-23

## 2024-10-23 NOTE — PROGRESS NOTES
ANTICOAGULATION DIRECT ORAL ANTICOAGULANT MONITORING    SUBJECTIVE     The [facility] is evaluating Hailey Alexis's Apixaban (Eliquis) as part of its Anticoagulation Monitoring Program.    Indication:Atrial Fibrillation  Current dose per medication list: Apixaban 2.5 mg BID  Recent hospitalizations/ED/Office Visits for bleeding/clotting concerns: No  Other bleeding or side effect concerns: No  Additional findings: Note neurology office note from 7/15/2020    Recommendations:  -- Antiplatelet agent: Aspirin was discontinued. Given her advanced age, the risk of hemorrhage was thought to outweigh the benefit.  -- Anticoagulant: [id number] score 5 = 6.7% annual stroke risk. Recommend life long anticoagulation. Currently on Apixaban 2.5 mg BID (dose per cardiology, age > 80 and weight 70 kg).   In summary, the dose was lowered based on incorrect parameters following her stroke admission in 2020. Authorizing provider can decide whether to continue at lower dose or increase per package insert at this point 4 years later and 89 years old    OBJECTIVE     Age: 89 year old    Wt Readings from Last 2 Encounters:   05/02/24 67.1 kg (148 lb)   04/24/24 66.2 kg (146 lb)      Lab Results   Component Value Date    CR 0.82 04/16/2024    CR 0.77 04/13/2024    CR 0.84 04/12/2024     Creatinine Clearance (using actual bodyweight, mL/min):     Lab Results   Component Value Date    HGB 14.7 04/18/2024     04/18/2024     ASSESSMENT/PLAN     A chart review for Direct Oral Anticoagulant (DOAC) Stewardship has been completed for:     Dosing: continue with current dosing    Plan made per [facility] anticoagulation protocol    Viki Mcgrath RN  Anticoagulation Clinic

## 2024-10-23 NOTE — TELEPHONE ENCOUNTER
Home Care Verbal Orders    Caller Name:Sujey  Agency: [facility]    Orders Requested:   Occupational Therapy (OT) addt'l visit for this week of 10/21/24

## 2024-10-29 DIAGNOSIS — I48.20 CHRONIC ATRIAL FIBRILLATION (H): ICD-10-CM

## 2024-10-29 DIAGNOSIS — G47.09 OTHER INSOMNIA: ICD-10-CM

## 2024-10-29 RX ORDER — APIXABAN 2.5 MG/1
2.5 TABLET, FILM COATED ORAL 2 TIMES DAILY
Qty: 60 TABLET | Refills: 0 | Status: SHIPPED | OUTPATIENT
Start: 2024-10-29

## 2024-10-29 RX ORDER — TRAZODONE HYDROCHLORIDE 50 MG/1
25 TABLET, FILM COATED ORAL AT BEDTIME
Qty: 45 TABLET | Refills: 0 | Status: SHIPPED | OUTPATIENT
Start: 2024-10-29 | End: 2024-11-11

## 2024-10-29 NOTE — TELEPHONE ENCOUNTER
Trazodone (Desyrel) 50 mg + Apixaban (Eliquis) 2.5 mg    Last Office Visit: 6/11/24  Future [facility] Appointments: 11/11/24  Medication last refilled:     Medication last refilled:   9/26/24 #45 with 0 refill(s) - Trazodone  10/7/24 #60 with 0 refill(s) - Apixaban    Required labs per protocol:    LAB REF RANGE 1/30/23 4/16/24   Creatinine 0.67-1.17 mg/dL 0.75 0.82   Platelets 15.0-450.0 d/uL 229 279     Prescription approved per [facility] Refill Protocol.    ELIZABETH LeeN, RN, CCM

## 2024-11-11 ENCOUNTER — OFFICE VISIT (OUTPATIENT)
Dept: FAMILY MEDICINE | Facility: CLINIC | Age: 89
End: 2024-11-11
Payer: COMMERCIAL

## 2024-11-11 VITALS
HEIGHT: 64 IN | BODY MASS INDEX: 26.8 KG/M2 | RESPIRATION RATE: 16 BRPM | TEMPERATURE: 96.3 F | OXYGEN SATURATION: 95 % | DIASTOLIC BLOOD PRESSURE: 81 MMHG | HEART RATE: 72 BPM | SYSTOLIC BLOOD PRESSURE: 120 MMHG | WEIGHT: 157 LBS

## 2024-11-11 DIAGNOSIS — Z23 HIGH PRIORITY FOR 2019-NCOV VACCINE: ICD-10-CM

## 2024-11-11 DIAGNOSIS — Z23 NEED FOR PROPHYLACTIC VACCINATION AND INOCULATION AGAINST INFLUENZA: Primary | ICD-10-CM

## 2024-11-11 NOTE — PROGRESS NOTES
"Preventive Care Visit  [facility]    IMPRESSION  Jenn is a 89 yo who is now having some progressive issues related to age leading to concerns about self-care.     ASSESSMENT/PLAN:    Annual Exam/Preventive Issues   - Labs: Reviewed past labs. No concerns today so no new labs were drawn today.   - Immunizations: Give Flu and Covid vaccines  - Cancer screenings: None needed      -Specific concerns:     Home cares:      - the majority of the visit was spent discussing Jenn's ability to stay home and what needs she has.  This was a conversation between Jenn, her 2 sons, Agus and Dre along with myself and our , Kim Barraza.  -  She definitely needs a caregiver to come by daily for issues of cooking and shopping.  Also needs help with showers.  And there is the issue of companionship.  They have a current person but that person may not be there for much longer.  This person was hired privately.  They are in discussion about whether to hire a service.  Jenn does not wish to go into any assisted living facility.    We also discussed end-of-life issues and she does not want any extraordinary means.  She wants to have things proceed naturally.  Both brothers seem to be in agreement with this.  -  For now she is still doing her home exercises which include strengthening exercises and balance training.  She has a walker at home.  Much of house has been redesigned so that she can get to the bathroom easier.  The brothers are putting up railings for her to uses handrails.    She knows that she can contact our  if they have any other concerns.      -Follow up: as needed    Gabe Etienne MD  Family Medicine and Sports Medicine      INTRODUCTION:   Jenn is a 89 yo who is here for an annual preventive exam.     Accompanying her to today's visit are her sons' Agus and Dre. Agus lives very nearby and Dre lives a few miles away near [facility].       They have been using the program called \"[facility]\" and " there has been physical therapy to the house, Occupational Therapy to the house, a nurse and a speech therapist to help her with memory difficulties.    Physical therapy has been very helpful teaching her exercises about how to get up and sit down with more safety.  They have lowered her walker.  Also the brothers have helped putting in bigger pathways in the house and some handrails.      Recently they have hired a personal care assistant by the name of Maria.  She has been helping with medications and making breakfast and preparing meals for the rest of the day.  However this person also has a secondary home and may not be able to be there for 7 days a week and may in fact need to leave the current position. They are hear to discuss other options.     Jenn has been feeling well.   Memory is starting to fail. Was 21/30 on testing.   No falls in a few years, but balance is poor and     Patient Active Problem List   Diagnosis    Cerebrovascular accident (H)    Chronic atrial fibrillation (H)    Hard of hearing    Cellulitis of left upper extremity    Cat bite, initial encounter    Osteoporosis       Social History     Social History Narrative     twice. Now, has a new boyfriend who is 13 years younger.     Has 4 children.     Lives in [place] with a grandson. Has an active social life.        Health Care Directive  Patient does not have a Health Care Directive: Patient states has Advance Directive and will bring in a copy to clinic.      11/11/2024   General Health   How would you rate your overall physical health? Good   Feel stress (tense, anxious, or unable to sleep) Only a little            11/11/2024   Nutrition   Diet: Regular (no restrictions)            11/11/2024   Exercise   Days per week of moderate/strenous exercise 3 days            11/11/2024   Social Factors   Frequency of gathering with friends or relatives More than three times a week   Worry food won't last until get money to buy more  No    No   Food not last or not have enough money for food? No    No   Do you have housing? (Housing is defined as stable permanent housing and does not include staying ouside in a car, in a tent, in an abandoned building, in an overnight shelter, or couch-surfing.) No    No   Are you worried about losing your housing? No    No   Lack of transportation? No    No   Unable to get utilities (heat,electricity)? No    No   Want help with housing or utility concern? No    No       Multiple values from one day are sorted in reverse-chronological order   (!) HOUSING CONCERN PRESENT       No data to display                  11/11/2024   Dental   Dentist two times every year? Yes             No data to display                     No data to display                  11/11/2024   TB Screening   Were you born outside of the [place]? No              Today's PHQ-2 Score:       5/2/2024     3:17 PM   PHQ-2 ( 1999 Pf[employer])   Q1: Little interest or pleasure in doing things 0   Q2: Feeling down, depressed or hopeless 0   PHQ-2 Score 0         11/11/2024   Substance Use   Alcohol more than 3/day or more than 7/wk No   Do you have a current opioid prescription? No   How severe/bad is pain from 1 to 10? 0/10 (No Pain)   Do you use any other substances recreationally? No        Social History     Tobacco Use    Smoking status: Former     Types: Cigarettes    Smokeless tobacco: Never   Substance Use Topics    Alcohol use: Yes     Alcohol/week: 1.0 standard drink of alcohol     Types: 1 Standard drinks or equivalent per week         Reviewed and updated as needed this visit by Provider   Tobacco  Allergies  Meds  Problems  Med Hx  Surg Hx  Fam Hx            Current providers sharing in care for this patient include:  Patient Care Team:  Gabe Etienne MD as PCP - General (Family Medicine)  Michael Hutchinson as Referring Physician (Pediatrics)  Hailey Gray MD as MD (Orthopedics)  Gabe Etienne MD as Assigned PCP  Adithya  "Beau Watkins as Audiologist (Audiology)  Armida Ponce NP as Assigned Surgical Provider  Lizet Fields PA-C as Assigned Musculoskeletal Provider  Cami Lemus MD as Assigned Infectious Disease Provider    The following health maintenance items are reviewed in Epic and correct as of today:  Health Maintenance   Topic Date Due    FALL RISK ASSESSMENT  Never done    MEDICARE ANNUAL WELLNESS VISIT  02/23/2012    ADVANCE CARE PLANNING  11/11/2029    DTAP/TDAP/TD IMMUNIZATION (7 - Td or Tdap) 04/11/2034    PHQ-2 (once per calendar year)  Completed    INFLUENZA VACCINE  Completed    Pneumococcal Vaccine: 65+ Years  Completed    ZOSTER IMMUNIZATION  Completed    RSV VACCINE  Completed    COVID-19 Vaccine  Completed    HPV IMMUNIZATION  Aged Out    MENINGITIS IMMUNIZATION  Aged Out    RSV MONOCLONAL ANTIBODY  Aged Out          Objective    Exam  /81 (BP Location: Left arm, Patient Position: Sitting, Cuff Size: Adult Regular)   Pulse 72   Temp (!) 96.3  F (35.7  C) (Skin)   Resp 16   Ht 1.613 m (5' 3.5\")   Wt 71.2 kg (157 lb)   SpO2 95%   BMI 27.37 kg/m     Estimated body mass index is 27.37 kg/m  as calculated from the following:    Height as of this encounter: 1.613 m (5' 3.5\").    Weight as of this encounter: 71.2 kg (157 lb).    Physical Exam    She appears as a frail but otherwise healthy 89-year-old.  She demonstrates the ability to rise from a seated position without any assistance.  She can walk just a few steps but definitely does not feel comfortable ambulating without a walker or the arm of one of her children.  She is hard of hearing.  She answers questions appropriately but admits to having some difficulty with memory.    Head and neck appear normal.    Cardiovascular-irregular rhythm with a rate of about 70 bpm.  No murmurs.    Lungs are clear to auscultation.    Abdomen is nontender.    Extremities have generally normal range of motion.        11/11/2024   Mini Cog   Mini-Cog Not " Completed (choose reason) Deaf/hard of hearing                 Signed Electronically by: Gabe Etienne MD

## 2024-11-11 NOTE — NURSING NOTE
"89 year old  Chief Complaint   Patient presents with    Physical     Medicare checkup    Imm/Inj     Flu Shot    Imm/Inj     COVID-19 VACCINE       Blood pressure 120/81, pulse 72, temperature (!) 96.3  F (35.7  C), temperature source Skin, resp. rate 16, height 1.613 m (5' 3.5\"), weight 71.2 kg (157 lb), SpO2 95%. Body mass index is 27.37 kg/m .  Patient Active Problem List   Diagnosis    Cerebrovascular accident (H)    Chronic atrial fibrillation (H)    Hard of hearing    Cellulitis of left upper extremity    Cat bite, initial encounter    Osteoporosis       Wt Readings from Last 2 Encounters:   11/11/24 71.2 kg (157 lb)   05/02/24 67.1 kg (148 lb)     BP Readings from Last 3 Encounters:   11/11/24 120/81   06/11/24 135/63   05/02/24 127/76         Current Outpatient Medications   Medication Sig Dispense Refill    alendronate (FOSAMAX) 70 MG tablet Take 1 tablet (70 mg) by mouth once a week. 12 tablet 2    Calcium Carbonate-Vitamin D 600-10 MG-MCG TABS Take 2 tablets by mouth daily. 180 tablet 3    ELIQUIS ANTICOAGULANT 2.5 MG tablet Take 1 tablet (2.5 mg) by mouth 2 times daily. 60 tablet 0    metoprolol succinate ER (TOPROL XL) 25 MG 24 hr tablet Take 1 tablet (25 mg) by mouth daily for 360 days 90 tablet 3    Multiple Vitamins-Minerals (PRESERVISION AREDS 2) CAPS Take 1 capsule by mouth daily. 90 capsule 3    traZODone (DESYREL) 50 MG tablet Take 0.5 tablets (25 mg) by mouth at bedtime. (Patient not taking: Reported on 11/11/2024) 45 tablet 0     No current facility-administered medications for this visit.       Social History     Tobacco Use    Smoking status: Former     Types: Cigarettes    Smokeless tobacco: Never   Substance Use Topics    Alcohol use: Yes     Alcohol/week: 1.0 standard drink of alcohol     Types: 1 Standard drinks or equivalent per week       Health Maintenance Due   Topic Date Due    ADVANCE CARE PLANNING  Never done    FALL RISK ASSESSMENT  Never done    MEDICARE ANNUAL WELLNESS VISIT  " "02/23/2012    INFLUENZA VACCINE (1) 09/01/2024    COVID-19 Vaccine (7 - 2024-25 season) 09/01/2024       No results found for: \"PAP\"      November 11, 2024 11:25 AM   "

## 2024-11-12 ENCOUNTER — MYC MEDICAL ADVICE (OUTPATIENT)
Dept: FAMILY MEDICINE | Facility: CLINIC | Age: 89
End: 2024-11-12

## 2024-11-12 ENCOUNTER — TELEPHONE (OUTPATIENT)
Dept: AUDIOLOGY | Facility: CLINIC | Age: 89
End: 2024-11-12
Payer: COMMERCIAL

## 2024-11-12 NOTE — TELEPHONE ENCOUNTER
[facility] Call Center    Phone Message    May a detailed message be left on voicemail: yes     Reason for Call: Other: The pt's son called to say the pt has lost one of her hearing aids. They want to know what to do to get another one. Please call the pt's son Agus at [phone]. Thanks.      Action Taken: Message routed to:  [facility] AUDIO    Travel Screening: Not Applicable     Date of Service:

## 2024-11-13 NOTE — TELEPHONE ENCOUNTER
Spoke with Agus about his mother's hearing aid. She lost the left one and he would like to order a replacement through loss and damage. We will contact him when it is in clinic.    Tang Blair, [facility]  Licensed Audiologist  [place] License #[id number]

## 2024-11-18 ENCOUNTER — MYC MEDICAL ADVICE (OUTPATIENT)
Dept: FAMILY MEDICINE | Facility: CLINIC | Age: 89
End: 2024-11-18

## 2024-11-18 DIAGNOSIS — I48.20 CHRONIC ATRIAL FIBRILLATION (H): ICD-10-CM

## 2024-11-18 NOTE — PROGRESS NOTES
SW met with pt and pt's two son's, Kellie.  Pt lives at home alone and currently has a formal caregiver caring for her from 5pm to 11am.  Son's are noticing some memory issues. They had also noticed pt's strength had decreased but they ave noticed an improvement since participating with PT/OT.    Agus and Dre want to know if pt can be left alone for a few hours during the day. At this time, PCP believes it to be safe for pt to be home along as there instructions for limited activity while alone. Pt states she feels she can be alone for a few hours during the day and enjoys her alone time.    Family is working with [employer] and will each out to them for more home/safety recommendations. Family will hire people as needed to care for pt as pt does not want to leave her home. Family understand these services will be private pay.     Family has made modifications to bathroom for safety.     Pt aware of self, place and time when meeting with SW.     SW will follow as family sees fit.     JENNIFER Lora, SW  [facility]

## 2024-11-19 NOTE — TELEPHONE ENCOUNTER
Medication requested: ELIQUIS ANTICOAGULANT 2.5 MG tablet   Last office visit: 11/11/24  [facility] appointments: none  Medication last refilled: 10/29/24; 60 + 0 refills  Last qualifying labs:   Component      Latest Ref Rng 4/16/2024  6:56 AM   GFR Estimate      >60 mL/min/1.73m2 66      Component      Latest Ref Rng 4/18/2024  9:50 AM   Platelet Count      150 - 450 10e3/uL 347      Routing refill request to provider for review/approval because:  Pt age > 79 years old     Cesar WILCOX, RN  11/19/24 3:09 PM

## 2024-11-20 RX ORDER — APIXABAN 2.5 MG/1
2.5 TABLET, FILM COATED ORAL 2 TIMES DAILY
Qty: 60 TABLET | Refills: 11 | Status: SHIPPED | OUTPATIENT
Start: 2024-11-20 | End: 2024-11-21

## 2024-11-21 DIAGNOSIS — I48.20 CHRONIC ATRIAL FIBRILLATION (H): ICD-10-CM

## 2024-11-21 RX ORDER — APIXABAN 2.5 MG/1
2.5 TABLET, FILM COATED ORAL 2 TIMES DAILY
Qty: 60 TABLET | Refills: 11 | Status: SHIPPED | OUTPATIENT
Start: 2024-11-21

## 2024-11-26 ENCOUNTER — DOCUMENTATION ONLY (OUTPATIENT)
Dept: AUDIOLOGY | Facility: CLINIC | Age: 89
End: 2024-11-26
Payer: COMMERCIAL

## 2024-11-26 DIAGNOSIS — H90.3 SENSORINEURAL HEARING LOSS, BILATERAL: Primary | ICD-10-CM

## 2024-11-26 NOTE — PROGRESS NOTES
Patient family member picked up replacement left hearing aid/earmold. Submitted charges for left L&D fee and new earmold.    Chris Mena  Audiology Clinic Assistant

## 2024-12-31 ENCOUNTER — MYC MEDICAL ADVICE (OUTPATIENT)
Dept: FAMILY MEDICINE | Facility: CLINIC | Age: 89
End: 2024-12-31

## 2025-05-19 ENCOUNTER — OFFICE VISIT (OUTPATIENT)
Dept: FAMILY MEDICINE | Facility: CLINIC | Age: OVER 89
End: 2025-05-19
Payer: COMMERCIAL

## 2025-05-19 VITALS
SYSTOLIC BLOOD PRESSURE: 133 MMHG | HEART RATE: 78 BPM | BODY MASS INDEX: 30.68 KG/M2 | OXYGEN SATURATION: 92 % | RESPIRATION RATE: 19 BRPM | WEIGHT: 176 LBS | TEMPERATURE: 97.2 F | DIASTOLIC BLOOD PRESSURE: 72 MMHG

## 2025-05-19 DIAGNOSIS — Z13.228 SCREENING FOR METABOLIC DISORDER: ICD-10-CM

## 2025-05-19 DIAGNOSIS — Z86.79 HX OF ATRIAL FLUTTER: ICD-10-CM

## 2025-05-19 DIAGNOSIS — R13.10 DYSPHAGIA, UNSPECIFIED TYPE: ICD-10-CM

## 2025-05-19 DIAGNOSIS — R26.89 POOR BALANCE: Primary | ICD-10-CM

## 2025-05-19 DIAGNOSIS — Z13.0 SCREENING, ANEMIA, DEFICIENCY, IRON: ICD-10-CM

## 2025-05-19 LAB
BASOPHILS # BLD AUTO: 0.1 10E3/UL (ref 0–0.2)
BASOPHILS NFR BLD AUTO: 1 %
EOSINOPHIL # BLD AUTO: 0.3 10E3/UL (ref 0–0.7)
EOSINOPHIL NFR BLD AUTO: 3 %
ERYTHROCYTE [DISTWIDTH] IN BLOOD BY AUTOMATED COUNT: 14.2 % (ref 10–15)
HCT VFR BLD AUTO: 48.9 % (ref 35–47)
HGB BLD-MCNC: 15.6 G/DL (ref 11.7–15.7)
IMM GRANULOCYTES # BLD: 0.1 10E3/UL
IMM GRANULOCYTES NFR BLD: 1 %
LYMPHOCYTES # BLD AUTO: 2.3 10E3/UL (ref 0.8–5.3)
LYMPHOCYTES NFR BLD AUTO: 23 %
MCH RBC QN AUTO: 30.5 PG (ref 26.5–33)
MCHC RBC AUTO-ENTMCNC: 31.9 G/DL (ref 31.5–36.5)
MCV RBC AUTO: 96 FL (ref 78–100)
MONOCYTES # BLD AUTO: 1.1 10E3/UL (ref 0–1.3)
MONOCYTES NFR BLD AUTO: 11 %
NEUTROPHILS # BLD AUTO: 6.5 10E3/UL (ref 1.6–8.3)
NEUTROPHILS NFR BLD AUTO: 63 %
NRBC # BLD AUTO: 0 10E3/UL
NRBC BLD AUTO-RTO: 0 /100
PLATELET # BLD AUTO: 264 10E3/UL (ref 150–450)
RBC # BLD AUTO: 5.11 10E6/UL (ref 3.8–5.2)
WBC # BLD AUTO: 10.3 10E3/UL (ref 4–11)

## 2025-05-19 RX ORDER — METOPROLOL SUCCINATE 25 MG/1
25 TABLET, EXTENDED RELEASE ORAL DAILY
Qty: 90 TABLET | Refills: 3 | Status: SHIPPED | OUTPATIENT
Start: 2025-05-19

## 2025-05-19 NOTE — PROGRESS NOTES
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   She needs work on strength  Preparation for upcoming travels  Had prolonged discussion about Jenn's needs on the upcoming trip with both strength and memory  Check BMP and CBC. Anticipate some minor abnormalities  For code status: She was to be DNR and DNI. They have this written on the refridgerator at home and will send us a copy of her healthcare directives        Gabe Etienne MD  Family Medicine and Sports Medicine  [facility]      Medical assistant intake:  Jenn is a 89 year old female who presents to [facility] today for Follow Up (Weight gain. Was doing PT at [facility]. Speech/short term memory concerns. Now has a M-F caregiver. Cruise -- flying to [place], [facility] and then flying to [place], back to [place] from there. In December. Will be traveling to [place] for the [employer] festival next week. [facility] upcoming also. ) and Musculoskeletal Problem (Knee -- periodic acute pain that is happening more frequently. Knee misalignment for a while.)      SUBJECTIVE:   Jenn is here with both of her sons, Agus and Dre.     It has been approximately 6 months since I have seen her in clinic.  They are all here  mainly so that she can have a check-in as she has lots of upcoming travel and her health is declining.  The upcoming travel includes a trip to [place], then a trip to [place] for a family event and then a trip to [place] where she will be going on a cruise and then to [place].  She will be going on that ladder trip with her son, Dre.      In terms of her health,  it is declining physically with her knees being the major problem.  She has significant pain in her  "right knee with some instability when walking.  She is using a wheelchair and a 4 point walker.  She has had physical therapy at home through [facility]\".  They have also provided occupational therapy once a week at home.  In addition to her referral to physical therapy, her sons are hoping that she can have a referral for a new wheelchair.      In addition to her physical health,  her cognition has been declining as her memory is getting shorter and shorter.  Recently her son, Agus took her to an appointment and then 30 minutes later she could not remember that he was the one who drove her there and that he was waiting for her outside.      She continues to be in very good spirits.  She wants to travel.    Review Of Systems:    Has otherwise been in usual state of health, e.g.   Denies any chest pain or shortness of breath.  No fever or sweats or chills.    Problem list per EMR:  Patient Active Problem List   Diagnosis    Cerebrovascular accident (H)    Chronic atrial fibrillation (H)    Hard of hearing    Cellulitis of left upper extremity    Cat bite, initial encounter    Osteoporosis       Current Outpatient Medications   Medication Sig Dispense Refill    Calcium Carbonate-Vitamin D 600-10 MG-MCG TABS Take 2 tablets by mouth daily. 180 tablet 3    ELIQUIS ANTICOAGULANT 2.5 MG tablet Take 1 tablet (2.5 mg) by mouth 2 times daily. 60 tablet 11    metoprolol succinate ER (TOPROL XL) 25 MG 24 hr tablet Take 1 tablet (25 mg) by mouth daily for 360 days 90 tablet 3    Multiple Vitamins-Minerals (PRESERVISION AREDS 2) CAPS Take 1 capsule by mouth daily. 90 capsule 3    alendronate (FOSAMAX) 70 MG tablet Take 1 tablet (70 mg) by mouth once a week. 12 tablet 2       Allergies   Allergen Reactions    Penicillin G Rash     Patient reports rash not requiring hospitalization in ~2000. Doesn't remember specifics but no tongue/throat swelling and no difficulty breathing. Reviewed with her 4/14/24.  Tolerated cefpodoxime " 4/14/24, ceftriaxone 4/15        OBJECTIVE    Vitals: /72 (BP Location: Right arm, Patient Position: Sitting, Cuff Size: Adult Regular)   Pulse 78   Temp 97.2  F (36.2  C) (Temporal)   Resp 19   Wt 79.8 kg (176 lb)   SpO2 92%   BMI 30.68 kg/m    BMI= Body mass index is 30.68 kg/m .    She appears in her usual state of health although her memory is definitely declining as she turns to her sons to answer many of the questions.    Cardiovascular is with an irregular rhythm.  No murmurs are heard and her rate is approximately 80 bpm.    Her lungs are clear to auscultation.    Her right knee has notable valgus deformity and her pain is mostly at the lateral joint line.  No redness or warmth.  She can  actively extend and flex  the knee and her hip has normal range of motion.    Reviewed x-rays that were long-leg films taken 2 years ago showing marked valgus deformity of the right knee and mild varus deformity of the left knee.    SEE TOP OF NOTE FOR ASSESSMENT AND PLAN  48 minutes spent on the date of the encounter doing chart review, history and exam, documentation and further activities as noted in the note.     --Gabe Etienne MD  [facility], Department of Family Medicine and Community Health

## 2025-05-19 NOTE — NURSING NOTE
89 year old  Chief Complaint   Patient presents with    Follow Up     Weight gain. Was doing PT at [facility]. Speech/short term memory concerns. Now has a M-F caregiver. Cruise -- flying to [place], going to [facility] and then flying to [place], back to [place] from there. In December. Will be traveling to [place] for the [employer] festival next week. [facility] upcoming also.     Musculoskeletal Problem     Knee -- periodic acute pain that is happening more frequently. Knee misalignment for a while.       Blood pressure 133/72, pulse 78, temperature 97.2  F (36.2  C), temperature source Temporal, resp. rate 19, SpO2 92%. There is no height or weight on file to calculate BMI.  Patient Active Problem List   Diagnosis    Cerebrovascular accident (H)    Chronic atrial fibrillation (H)    Hard of hearing    Cellulitis of left upper extremity    Cat bite, initial encounter    Osteoporosis       Wt Readings from Last 2 Encounters:   11/11/24 71.2 kg (157 lb)   05/02/24 67.1 kg (148 lb)     BP Readings from Last 3 Encounters:   05/19/25 133/72   11/11/24 120/81   06/11/24 135/63         Current Outpatient Medications   Medication Sig Dispense Refill    Calcium Carbonate-Vitamin D 600-10 MG-MCG TABS Take 2 tablets by mouth daily. 180 tablet 3    ELIQUIS ANTICOAGULANT 2.5 MG tablet Take 1 tablet (2.5 mg) by mouth 2 times daily. 60 tablet 11    metoprolol succinate ER (TOPROL XL) 25 MG 24 hr tablet Take 1 tablet (25 mg) by mouth daily for 360 days 90 tablet 3    Multiple Vitamins-Minerals (PRESERVISION AREDS 2) CAPS Take 1 capsule by mouth daily. 90 capsule 3    alendronate (FOSAMAX) 70 MG tablet Take 1 tablet (70 mg) by mouth once a week. 12 tablet 2     No current facility-administered medications for this visit.       Social History     Tobacco Use    Smoking status: Former     Types: Cigarettes    Smokeless tobacco: Never   Substance Use Topics    Alcohol use: Yes     Alcohol/week: 1.0 standard drink of alcohol     Types: 1  "Standard drinks or equivalent per week       Health Maintenance Due   Topic Date Due    FALL RISK ASSESSMENT  Never done    PHQ-2 (once per calendar year)  01/01/2025    COVID-19 Vaccine (8 - 2024-25 season) 05/11/2025       No results found for: \"PAP\"      May 19, 2025 2:54 PM    "

## 2025-05-19 NOTE — PATIENT INSTRUCTIONS
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   She needs work on strength  Preparation for upcoming travels  Had prolonged discussion about Jenn's needs on the upcoming trip with both strength and memory  Check BMP and CBC. Anticipate some minor abnormalities  For code status: She was to be DNR and DNI. They have this written on the refridgerator at home and will send us a copy of her healthcare directives    Gabe Etienne MD  Family Medicine and Sports Medicine  [facility]

## 2025-05-20 ENCOUNTER — RESULTS FOLLOW-UP (OUTPATIENT)
Dept: FAMILY MEDICINE | Facility: CLINIC | Age: OVER 89
End: 2025-05-20

## 2025-05-20 LAB
ANION GAP SERPL CALCULATED.3IONS-SCNC: 11 MMOL/L (ref 7–15)
BUN SERPL-MCNC: 24.1 MG/DL (ref 8–23)
CALCIUM SERPL-MCNC: 9.8 MG/DL (ref 8.8–10.4)
CHLORIDE SERPL-SCNC: 101 MMOL/L (ref 98–107)
CREAT SERPL-MCNC: 0.76 MG/DL (ref 0.51–0.95)
EGFRCR SERPLBLD CKD-EPI 2021: 72 ML/MIN/1.73M2
GLUCOSE SERPL-MCNC: 91 MG/DL (ref 70–99)
HCO3 SERPL-SCNC: 27 MMOL/L (ref 22–29)
POTASSIUM SERPL-SCNC: 4.8 MMOL/L (ref 3.4–5.3)
SODIUM SERPL-SCNC: 139 MMOL/L (ref 135–145)

## 2025-05-21 ENCOUNTER — TELEPHONE (OUTPATIENT)
Dept: FAMILY MEDICINE | Facility: CLINIC | Age: OVER 89
End: 2025-05-21

## 2025-05-21 NOTE — TELEPHONE ENCOUNTER
Home Care Verbal Orders    Caller Name:MANNY Givens  Agency: [facility]     Orders Requested:   Physical Therapy (PT),Occupational Therapy (OT) - Delay start of care until Friday, 5/23. Ok to leave a voicemail.

## 2025-05-29 ENCOUNTER — TELEPHONE (OUTPATIENT)
Dept: FAMILY MEDICINE | Facility: CLINIC | Age: OVER 89
End: 2025-05-29

## 2025-05-29 NOTE — TELEPHONE ENCOUNTER
Called MANDY Abad @ [facility] and left voice messaging confirming verbal orders as requested.    JERI Leon, RN  05/29/25, 4:15 PM

## 2025-05-29 NOTE — TELEPHONE ENCOUNTER
Home Care Verbal Orders    Caller Name:MANDY Abad  Agency: [facility]    Orders Requested:   Other Speech Therapy- Delay speech evaluation until June 3rd. Ok to leave a voicemail.

## 2025-06-04 ENCOUNTER — TELEPHONE (OUTPATIENT)
Dept: FAMILY MEDICINE | Facility: CLINIC | Age: OVER 89
End: 2025-06-04

## 2025-06-04 NOTE — TELEPHONE ENCOUNTER
Call placed to MANDY Abad, with [facility] at [phone] authorizing SLP once weekly x 4 weeks.  ELIZABETH LeeN, RN, [facility]  RN Care Coordinator  [facility]  06/04/25  5:10 PM  Phone: [phone]

## 2025-06-04 NOTE — TELEPHONE ENCOUNTER
Home Care Verbal Orders     Caller Name:MANDY Abad  Agency: [facility]     Orders Requested:   Other Speech Therapy-Once a week for 4 weeks for cognition.  Ok to leave a voicemail.

## 2025-06-10 PROBLEM — M17.11 OSTEOARTHRITIS OF RIGHT KNEE, UNSPECIFIED OSTEOARTHRITIS TYPE: Status: ACTIVE | Noted: 2018-08-15

## 2025-06-11 ENCOUNTER — TELEPHONE (OUTPATIENT)
Dept: FAMILY MEDICINE | Facility: CLINIC | Age: OVER 89
End: 2025-06-11

## 2025-06-11 NOTE — TELEPHONE ENCOUNTER
Form or Letter Request    Type or form/letter needing completion: [employer]  Provider: Jaimie   Has provider seen patient for office visit related to reason for form request? Does not know  Date form needed: As soon as possible   Location of form: Forms Bin   Once completed: Fax form to: [employer] [phone]    Please refer to [facility] Primary Care Services Line Forms Table to see who is responsible for completing

## 2025-06-13 NOTE — TELEPHONE ENCOUNTER
Letter placed on Dr. Etienne desk to sign and then return letter to [place].    ELIZABETH LeeN, RN, CCM  RN Care Coordinator  [facility]  06/13/25  3:09 PM  Phone: [phone]

## 2025-06-16 NOTE — TELEPHONE ENCOUNTER
Letter signed.  Phone call placed to Jenn, and voicemail left informing her that the letter is ready and I would like to know if she plans on picking it up or if she wants me to send it to her.  Will await for her to contact the clinic.  ELIZABETH LeeN, RN, [facility]  RN Care Coordinator  [facility]  06/16/25  12:31 PM  Phone: [phone]

## 2025-06-17 NOTE — TELEPHONE ENCOUNTER
Letter faxed to [employer] at fax #:  [phone].  Family notified via phone.  ELIZABETH LeeN, RN, [facility]  RN Care Coordinator  [facility]  06/17/25  8:58 AM  Phone: [phone]

## 2025-07-08 ENCOUNTER — HOSPITAL ENCOUNTER (INPATIENT)
Facility: CLINIC | Age: OVER 89
End: 2025-07-08
Attending: EMERGENCY MEDICINE
Payer: COMMERCIAL

## 2025-07-08 ENCOUNTER — OFFICE VISIT (OUTPATIENT)
Dept: FAMILY MEDICINE | Facility: CLINIC | Age: OVER 89
End: 2025-07-08
Payer: COMMERCIAL

## 2025-07-08 ENCOUNTER — APPOINTMENT (OUTPATIENT)
Dept: GENERAL RADIOLOGY | Facility: CLINIC | Age: OVER 89
DRG: 871 | End: 2025-07-08
Attending: EMERGENCY MEDICINE
Payer: COMMERCIAL

## 2025-07-08 VITALS — HEART RATE: 103 BPM | SYSTOLIC BLOOD PRESSURE: 113 MMHG | OXYGEN SATURATION: 84 % | DIASTOLIC BLOOD PRESSURE: 70 MMHG

## 2025-07-08 DIAGNOSIS — A41.9 SEPSIS WITH ACUTE HYPOXIC RESPIRATORY FAILURE WITHOUT SEPTIC SHOCK, DUE TO UNSPECIFIED ORGANISM (H): ICD-10-CM

## 2025-07-08 DIAGNOSIS — R06.02 SHORTNESS OF BREATH: Primary | ICD-10-CM

## 2025-07-08 DIAGNOSIS — I48.91 ATRIAL FIBRILLATION, UNSPECIFIED TYPE (H): ICD-10-CM

## 2025-07-08 DIAGNOSIS — R65.20 SEPSIS WITH ACUTE HYPOXIC RESPIRATORY FAILURE WITHOUT SEPTIC SHOCK, DUE TO UNSPECIFIED ORGANISM (H): ICD-10-CM

## 2025-07-08 DIAGNOSIS — N30.00 ACUTE CYSTITIS WITHOUT HEMATURIA: ICD-10-CM

## 2025-07-08 DIAGNOSIS — Z79.01 LONG TERM (CURRENT) USE OF ANTICOAGULANTS: ICD-10-CM

## 2025-07-08 DIAGNOSIS — J96.01 SEPSIS WITH ACUTE HYPOXIC RESPIRATORY FAILURE WITHOUT SEPTIC SHOCK, DUE TO UNSPECIFIED ORGANISM (H): ICD-10-CM

## 2025-07-08 DIAGNOSIS — J69.0 ASPIRATION PNEUMONIA OF RIGHT LUNG DUE TO GASTRIC SECRETIONS, UNSPECIFIED PART OF LUNG (H): Primary | ICD-10-CM

## 2025-07-08 DIAGNOSIS — R50.9 FEVER, UNSPECIFIED FEVER CAUSE: ICD-10-CM

## 2025-07-08 PROBLEM — H35.3132 INTERMEDIATE STAGE NONEXUDATIVE AGE-RELATED MACULAR DEGENERATION OF BOTH EYES: Status: ACTIVE | Noted: 2018-11-28

## 2025-07-08 PROBLEM — I63.9 CEREBROVASCULAR ACCIDENT (H): Status: ACTIVE | Noted: 2023-08-09

## 2025-07-08 PROBLEM — L03.114 CELLULITIS OF LEFT UPPER EXTREMITY: Status: RESOLVED | Noted: 2024-04-11 | Resolved: 2025-07-08

## 2025-07-08 PROBLEM — I48.20 CHRONIC ATRIAL FIBRILLATION (H): Status: ACTIVE | Noted: 2023-08-09

## 2025-07-08 PROBLEM — I48.92 ATRIAL FLUTTER (H): Status: ACTIVE | Noted: 2020-06-12

## 2025-07-08 PROBLEM — I36.1 NONRHEUMATIC TRICUSPID VALVE REGURGITATION: Status: ACTIVE | Noted: 2022-04-27

## 2025-07-08 LAB
ALBUMIN SERPL BCG-MCNC: 3.4 G/DL (ref 3.5–5.2)
ALBUMIN UR-MCNC: 70 MG/DL
ALP SERPL-CCNC: 221 U/L (ref 40–150)
ALT SERPL W P-5'-P-CCNC: ABNORMAL U/L
ANION GAP SERPL CALCULATED.3IONS-SCNC: 12 MMOL/L (ref 7–15)
APPEARANCE UR: ABNORMAL
AST SERPL W P-5'-P-CCNC: ABNORMAL U/L
BACTERIA #/AREA URNS HPF: ABNORMAL /HPF
BASE EXCESS BLDV CALC-SCNC: 4 MMOL/L (ref -3–3)
BASOPHILS # BLD AUTO: 0.1 10E3/UL (ref 0–0.2)
BASOPHILS NFR BLD AUTO: 0 %
BILIRUB SERPL-MCNC: 1.4 MG/DL
BILIRUB UR QL STRIP: NEGATIVE
BUN SERPL-MCNC: 24.5 MG/DL (ref 8–23)
C PNEUM DNA SPEC QL NAA+PROBE: NOT DETECTED
CALCIUM SERPL-MCNC: 9.5 MG/DL (ref 8.8–10.4)
CHLORIDE SERPL-SCNC: 99 MMOL/L (ref 98–107)
COLOR UR AUTO: ABNORMAL
CREAT SERPL-MCNC: 0.76 MG/DL (ref 0.51–0.95)
CRP SERPL-MCNC: 188 MG/L
D DIMER PPP FEU-MCNC: 0.89 UG/ML FEU (ref 0–0.5)
EGFRCR SERPLBLD CKD-EPI 2021: 72 ML/MIN/1.73M2
EOSINOPHIL # BLD AUTO: 0.1 10E3/UL (ref 0–0.7)
EOSINOPHIL NFR BLD AUTO: 0 %
ERYTHROCYTE [DISTWIDTH] IN BLOOD BY AUTOMATED COUNT: 14.4 % (ref 10–15)
FLUAV H1 2009 PAND RNA SPEC QL NAA+PROBE: NOT DETECTED
FLUAV H1 RNA SPEC QL NAA+PROBE: NOT DETECTED
FLUAV H3 RNA SPEC QL NAA+PROBE: NOT DETECTED
FLUAV RNA SPEC QL NAA+PROBE: NEGATIVE
FLUAV RNA SPEC QL NAA+PROBE: NOT DETECTED
FLUBV RNA RESP QL NAA+PROBE: NEGATIVE
FLUBV RNA SPEC QL NAA+PROBE: NOT DETECTED
GLUCOSE SERPL-MCNC: 121 MG/DL (ref 70–99)
GLUCOSE UR STRIP-MCNC: NEGATIVE MG/DL
HADV DNA SPEC QL NAA+PROBE: NOT DETECTED
HCO3 BLDV-SCNC: 29 MMOL/L (ref 21–28)
HCO3 SERPL-SCNC: 24 MMOL/L (ref 22–29)
HCOV PNL SPEC NAA+PROBE: NOT DETECTED
HCT VFR BLD AUTO: 47.6 % (ref 35–47)
HGB BLD-MCNC: 15.7 G/DL (ref 11.7–15.7)
HGB UR QL STRIP: ABNORMAL
HMPV RNA SPEC QL NAA+PROBE: NOT DETECTED
HPIV1 RNA SPEC QL NAA+PROBE: NOT DETECTED
HPIV2 RNA SPEC QL NAA+PROBE: NOT DETECTED
HPIV3 RNA SPEC QL NAA+PROBE: NOT DETECTED
HPIV4 RNA SPEC QL NAA+PROBE: NOT DETECTED
IMM GRANULOCYTES # BLD: 0.2 10E3/UL
IMM GRANULOCYTES NFR BLD: 1 %
KETONES UR STRIP-MCNC: NEGATIVE MG/DL
LACTATE BLD-SCNC: 1.7 MMOL/L (ref 0.7–2)
LEUKOCYTE ESTERASE UR QL STRIP: ABNORMAL
LYMPHOCYTES # BLD AUTO: 1.6 10E3/UL (ref 0.8–5.3)
LYMPHOCYTES NFR BLD AUTO: 6 %
M PNEUMO DNA SPEC QL NAA+PROBE: NOT DETECTED
MAGNESIUM SERPL-MCNC: 2 MG/DL (ref 1.7–2.3)
MCH RBC QN AUTO: 30.5 PG (ref 26.5–33)
MCHC RBC AUTO-ENTMCNC: 33 G/DL (ref 31.5–36.5)
MCV RBC AUTO: 92 FL (ref 78–100)
MONOCYTES # BLD AUTO: 2.5 10E3/UL (ref 0–1.3)
MONOCYTES NFR BLD AUTO: 10 %
MRSA DNA SPEC QL NAA+PROBE: NEGATIVE
MUCOUS THREADS #/AREA URNS LPF: PRESENT /LPF
NEUTROPHILS # BLD AUTO: 20.6 10E3/UL (ref 1.6–8.3)
NEUTROPHILS NFR BLD AUTO: 82 %
NITRATE UR QL: NEGATIVE
NRBC # BLD AUTO: 0 10E3/UL
NRBC BLD AUTO-RTO: 0 /100
NT-PROBNP SERPL-MCNC: 3185 PG/ML (ref 0–624)
PCO2 BLDV: 44 MM HG (ref 40–50)
PH BLDV: 7.43 [PH] (ref 7.32–7.43)
PH UR STRIP: 5.5 [PH] (ref 5–7)
PHOSPHATE SERPL-MCNC: 2.8 MG/DL (ref 2.5–4.5)
PLAT MORPH BLD: NORMAL
PLATELET # BLD AUTO: 312 10E3/UL (ref 150–450)
PO2 BLDV: 40 MM HG (ref 25–47)
POTASSIUM SERPL-SCNC: 5 MMOL/L (ref 3.4–5.3)
PROCALCITONIN SERPL IA-MCNC: 0.33 NG/ML
PROT SERPL-MCNC: 7.7 G/DL (ref 6.4–8.3)
RBC # BLD AUTO: 5.15 10E6/UL (ref 3.8–5.2)
RBC MORPH BLD: NORMAL
RBC URINE: >182 /HPF
RSV RNA SPEC NAA+PROBE: NEGATIVE
RSV RNA SPEC QL NAA+PROBE: NOT DETECTED
RSV RNA SPEC QL NAA+PROBE: NOT DETECTED
RV+EV RNA SPEC QL NAA+PROBE: NOT DETECTED
SA TARGET DNA: NEGATIVE
SAO2 % BLDV: 76 % (ref 70–75)
SARS-COV-2 RNA RESP QL NAA+PROBE: NEGATIVE
SODIUM SERPL-SCNC: 135 MMOL/L (ref 135–145)
SP GR UR STRIP: 1.02 (ref 1–1.03)
SQUAMOUS EPITHELIAL: 4 /HPF
TROPONIN T SERPL HS-MCNC: 12 NG/L
TROPONIN T SERPL HS-MCNC: 13 NG/L
UROBILINOGEN UR STRIP-MCNC: 6 MG/DL
WBC # BLD AUTO: 25 10E3/UL (ref 4–11)
WBC CLUMPS #/AREA URNS HPF: PRESENT /HPF
WBC URINE: >182 /HPF

## 2025-07-08 PROCEDURE — 84145 PROCALCITONIN (PCT): CPT | Performed by: EMERGENCY MEDICINE

## 2025-07-08 PROCEDURE — 83605 ASSAY OF LACTIC ACID: CPT

## 2025-07-08 PROCEDURE — 93005 ELECTROCARDIOGRAM TRACING: CPT | Performed by: EMERGENCY MEDICINE

## 2025-07-08 PROCEDURE — 99291 CRITICAL CARE FIRST HOUR: CPT | Performed by: EMERGENCY MEDICINE

## 2025-07-08 PROCEDURE — 96374 THER/PROPH/DIAG INJ IV PUSH: CPT

## 2025-07-08 PROCEDURE — 36415 COLL VENOUS BLD VENIPUNCTURE: CPT | Performed by: EMERGENCY MEDICINE

## 2025-07-08 PROCEDURE — 99291 CRITICAL CARE FIRST HOUR: CPT | Mod: 25 | Performed by: EMERGENCY MEDICINE

## 2025-07-08 PROCEDURE — 71046 X-RAY EXAM CHEST 2 VIEWS: CPT | Mod: 26 | Performed by: RADIOLOGY

## 2025-07-08 PROCEDURE — 87040 BLOOD CULTURE FOR BACTERIA: CPT | Performed by: EMERGENCY MEDICINE

## 2025-07-08 PROCEDURE — 84484 ASSAY OF TROPONIN QUANT: CPT | Performed by: EMERGENCY MEDICINE

## 2025-07-08 PROCEDURE — 99285 EMERGENCY DEPT VISIT HI MDM: CPT | Mod: 25 | Performed by: EMERGENCY MEDICINE

## 2025-07-08 PROCEDURE — 999N000285 HC STATISTIC VASC ACCESS LAB DRAW WITH PIV START

## 2025-07-08 PROCEDURE — 71046 X-RAY EXAM CHEST 2 VIEWS: CPT

## 2025-07-08 PROCEDURE — 81001 URINALYSIS AUTO W/SCOPE: CPT | Performed by: EMERGENCY MEDICINE

## 2025-07-08 PROCEDURE — 83735 ASSAY OF MAGNESIUM: CPT | Performed by: HOSPITALIST

## 2025-07-08 PROCEDURE — 250N000013 HC RX MED GY IP 250 OP 250 PS 637: Performed by: HOSPITALIST

## 2025-07-08 PROCEDURE — 85379 FIBRIN DEGRADATION QUANT: CPT

## 2025-07-08 PROCEDURE — 87186 SC STD MICRODIL/AGAR DIL: CPT | Performed by: EMERGENCY MEDICINE

## 2025-07-08 PROCEDURE — 87641 MR-STAPH DNA AMP PROBE: CPT | Performed by: EMERGENCY MEDICINE

## 2025-07-08 PROCEDURE — 87637 SARSCOV2&INF A&B&RSV AMP PRB: CPT | Performed by: EMERGENCY MEDICINE

## 2025-07-08 PROCEDURE — 85004 AUTOMATED DIFF WBC COUNT: CPT | Performed by: EMERGENCY MEDICINE

## 2025-07-08 PROCEDURE — 250N000011 HC RX IP 250 OP 636: Performed by: EMERGENCY MEDICINE

## 2025-07-08 PROCEDURE — 86140 C-REACTIVE PROTEIN: CPT

## 2025-07-08 PROCEDURE — 250N000009 HC RX 250

## 2025-07-08 PROCEDURE — 84155 ASSAY OF PROTEIN SERUM: CPT | Performed by: EMERGENCY MEDICINE

## 2025-07-08 PROCEDURE — 87581 M.PNEUMON DNA AMP PROBE: CPT

## 2025-07-08 PROCEDURE — 258N000003 HC RX IP 258 OP 636: Performed by: EMERGENCY MEDICINE

## 2025-07-08 PROCEDURE — 83880 ASSAY OF NATRIURETIC PEPTIDE: CPT | Performed by: EMERGENCY MEDICINE

## 2025-07-08 PROCEDURE — 99222 1ST HOSP IP/OBS MODERATE 55: CPT | Mod: GC | Performed by: HOSPITALIST

## 2025-07-08 PROCEDURE — 120N000002 HC R&B MED SURG/OB UMMC

## 2025-07-08 RX ORDER — CEFEPIME HYDROCHLORIDE 2 G/1
2 INJECTION, POWDER, FOR SOLUTION INTRAVENOUS ONCE
Status: COMPLETED | OUTPATIENT
Start: 2025-07-08 | End: 2025-07-08

## 2025-07-08 RX ORDER — LEVALBUTEROL INHALATION SOLUTION 1.25 MG/3ML
1.25 SOLUTION RESPIRATORY (INHALATION) EVERY 4 HOURS PRN
Status: DISCONTINUED | OUTPATIENT
Start: 2025-07-08 | End: 2025-07-12 | Stop reason: HOSPADM

## 2025-07-08 RX ORDER — ONDANSETRON 2 MG/ML
4 INJECTION INTRAMUSCULAR; INTRAVENOUS EVERY 6 HOURS PRN
Status: DISCONTINUED | OUTPATIENT
Start: 2025-07-08 | End: 2025-07-12 | Stop reason: HOSPADM

## 2025-07-08 RX ORDER — CEFEPIME HYDROCHLORIDE 2 G/1
2 INJECTION, POWDER, FOR SOLUTION INTRAVENOUS EVERY 12 HOURS
Status: DISCONTINUED | OUTPATIENT
Start: 2025-07-09 | End: 2025-07-09

## 2025-07-08 RX ORDER — AMOXICILLIN 250 MG
1 CAPSULE ORAL 2 TIMES DAILY PRN
Status: DISCONTINUED | OUTPATIENT
Start: 2025-07-08 | End: 2025-07-12 | Stop reason: HOSPADM

## 2025-07-08 RX ORDER — METOPROLOL SUCCINATE 25 MG/1
25 TABLET, EXTENDED RELEASE ORAL DAILY
Status: DISCONTINUED | OUTPATIENT
Start: 2025-07-08 | End: 2025-07-12 | Stop reason: HOSPADM

## 2025-07-08 RX ORDER — AMOXICILLIN 250 MG
2 CAPSULE ORAL 2 TIMES DAILY PRN
Status: DISCONTINUED | OUTPATIENT
Start: 2025-07-08 | End: 2025-07-12 | Stop reason: HOSPADM

## 2025-07-08 RX ORDER — PROCHLORPERAZINE MALEATE 5 MG/1
5 TABLET ORAL EVERY 6 HOURS PRN
Status: DISCONTINUED | OUTPATIENT
Start: 2025-07-08 | End: 2025-07-12 | Stop reason: HOSPADM

## 2025-07-08 RX ORDER — ONDANSETRON 4 MG/1
4 TABLET, ORALLY DISINTEGRATING ORAL EVERY 6 HOURS PRN
Status: DISCONTINUED | OUTPATIENT
Start: 2025-07-08 | End: 2025-07-12 | Stop reason: HOSPADM

## 2025-07-08 RX ORDER — LIDOCAINE 40 MG/G
CREAM TOPICAL
Status: DISCONTINUED | OUTPATIENT
Start: 2025-07-08 | End: 2025-07-12 | Stop reason: HOSPADM

## 2025-07-08 RX ADMIN — LEVALBUTEROL HYDROCHLORIDE 1.25 MG: 1.25 SOLUTION RESPIRATORY (INHALATION) at 21:06

## 2025-07-08 RX ADMIN — APIXABAN 2.5 MG: 2.5 TABLET, FILM COATED ORAL at 20:53

## 2025-07-08 RX ADMIN — SODIUM CHLORIDE 1000 ML: 0.9 INJECTION, SOLUTION INTRAVENOUS at 16:13

## 2025-07-08 RX ADMIN — Medication 1250 MG: at 17:24

## 2025-07-08 RX ADMIN — CEFEPIME HYDROCHLORIDE 2 G: 2 INJECTION, POWDER, FOR SOLUTION INTRAVENOUS at 16:12

## 2025-07-08 ASSESSMENT — ACTIVITIES OF DAILY LIVING (ADL)
ADLS_ACUITY_SCORE: 58
ADLS_ACUITY_SCORE: 44
ADLS_ACUITY_SCORE: 58

## 2025-07-08 ASSESSMENT — COLUMBIA-SUICIDE SEVERITY RATING SCALE - C-SSRS
6. HAVE YOU EVER DONE ANYTHING, STARTED TO DO ANYTHING, OR PREPARED TO DO ANYTHING TO END YOUR LIFE?: NO
1. IN THE PAST MONTH, HAVE YOU WISHED YOU WERE DEAD OR WISHED YOU COULD GO TO SLEEP AND NOT WAKE UP?: NO
2. HAVE YOU ACTUALLY HAD ANY THOUGHTS OF KILLING YOURSELF IN THE PAST MONTH?: NO

## 2025-07-08 NOTE — ED PROVIDER NOTES
[place] EMERGENCY DEPARTMENT ([facility])    7/08/25       ED PROVIDER NOTE      History     Chief Complaint   Patient presents with    Fatigue    Cough     HPI  Hailey Alexis is a 89 year old female with a history of CVA, atrial fibrillation on Eliquis, osteoarthritis of right knee, osteoporosis, who presents to the ED with fatigue, shortness of breath, and cough. Patient is hard of hearing, accompanied by her son who contributes to her history. Patient has been sleeping excessively for the past 4 days and has had a poor appetite. Yesterday she started having a wet cough that was unproductive and became short of breath. Last night her temperature was 100.7, came down below 100 before midnight. Patient did not take her medications last night, and missed one pill this morning that her son suspects may have been Eliquis. She reports some urinary frequency, denies other urinary symptoms. Patient denies recent falls, chest pain, belly pain, vomiting, diarrhea, or leg swelling. She ha snot had any recent medication changes.    Physical Exam   BP: 118/76  Pulse: 100  Temp: 97.8  F (36.6  C)  Resp: 24  SpO2: (!) 88 % (placed on oxygen)  Physical Exam  General: patient is alert and oriented and in no acute distress   Head: atraumatic and normocephalic   EENT: tacky mucus membranes without tonsillar erythema or exudates, pupils round and reactive   Neck: supple   Cardiovascular: regular rate and rhythm, extremities warm and well perfused, 1+ bilateral lower extremity edema  Pulmonary: lungs clear to auscultation bilaterally   Abdomen: soft, non-tender, nondistended  Musculoskeletal: normal range of motion   Neurological: alert and oriented, moving all extremities symmetrically  Skin: warm, dry     ED Course, Procedures, & Data      Procedures         The patient has signs of sepsis   Sepsis ED evaluation   The patient has signs of sepsis as evidenced by:  1. Presence of 2 SIRS criteria, suspected  infection, AND  2. Organ dysfunction: Sepsis work up in progress. Will continue to monitor for signs of organ dysfunction    Sepsis Care Initiation: Starting at 1430 PM on 07/08/25, until specified. Prior to this documentation, sepsis, severe sepsis, or septic shock was NOT thought to be a significant cause of illness. This order represents the first time infection was seriously considered to be affecting the patient.    Lactic Acid Results:  Recent Labs   Lab Test 07/08/25  1419   LACT 1.7       3 Hour Bundle 6 Hour Bundle (Reassessment)   Blood Cultures before IV Antibiotics: Yes  Antibiotics given: see below  Prehospital fluid volume (mL):                     Total fluids given (ED +Pre-hospital):  Full 30 mL/kg bolus intentionally NOT administered to this patient due to CHF and acute Pulmonary Edema. The target volume to infuse in this patient is 1000.   Repeat Lactic Acid Level: Ordered by reflex for 2 hours after initial lactic acid collection.  Vasopressors: MAP>65 after initial IVF bolus, will continue to monitor fluid status and vital signs.  Repeat perfusion exam: I attest to having performed a repeat sepsis exam and assessment of perfusion at 1600 PM.   BMI Readings from Last 1 Encounters:   07/09/25 31.09 kg/m        Anti-infectives (From admission through now)      Start     Dose/Rate Route Frequency Ordered Stop    07/08/25 1520  ceFEPIme (MAXIPIME) 2 g vial to attach to  mL bag for ADULTS or NS 50 mL bag for PEDS         2 g  over 30 Minutes Intravenous ONCE 07/08/25 1518 07/08/25 1707                      Medications - No data to display       Critical Care Addendum  My initial assessment, based on my review of nursing observations, review of vital signs, focused history, physical exam, and discussion with IM, established a high suspicion that Hailey Alexis has sepsis with indication for early goal-directed therapy, which requires immediate intervention, and therefore she is  critically ill.     After the initial assessment, the care team initiated multiple lab tests, initiated IV fluid administration, initiated medication therapy with cefepime, vancomycin, and consulted with IM to provide stabilization care. Due to the critical nature of this patient, I reassessed nursing observations, vital signs, physical exam, mental status, and respiratory status multiple times prior to her disposition.     Time also spent performing documentation, discussion with family to obtain medical information for decision making, reviewing test results, discussion with consultants, and coordination of care.     Critical care time (excluding teaching time and procedures): 30 minutes.       Assessment & Plan    Ms. Alexis is a 89 year old female with a history of CVA, atrial fibrillation on Eliquis, osteoarthritis of right knee, osteoporosis, who presents to the ED with fatigue, shortness of breath, and cough.  She is noted to be tachycardic, normotensive and hypoxic upon arrival.  She is in atrial fibrillation with rates in the 100s to 110s.  Typically is not on any supplemental oxygen but currently requiring 2 L nasal cannula.  Did have a chest x-ray which is unremarkable.  BNP is elevated at 3185.  Troponin is 13, delta is 12.  She has a significant leukocytosis to 25 and elevation in CRP.  UA is consistent with UTI.  History and exam are consistent with sepsis secondary to UTI likely causing A-fib and worsening heart failure.  At this point I have a low suspicion for PE given she is anticoagulated and has been compliant with this and will plan to treat with gentle IV fluids, cefepime and vancomycin.  If patient is not improving may warrant further evaluation with chest CT.  She will be admitted to medicine for ongoing management.    I have reviewed the nursing notes. I have reviewed the findings, diagnosis, plan and need for follow up with the patient.    Current Discharge Medication List          Final  diagnoses:   Sepsis with acute hypoxic respiratory failure without septic shock, due to unspecified organism (H)   Acute cystitis without hematuria       I, Jv Combs, am serving as a trained medical scribe to document services personally performed by Mira Gutierres MD based on the provider's statements to me on July 8, 2025.  This document has been checked and approved by the attending provider.    I, Mira Gutierres MD, was physically present and have reviewed and verified the accuracy of this note documented by Jv Combs, medical scribe.      Mira Gutierres MD  [facility] EMERGENCY DEPARTMENT  7/8/2025     Mira Gutierres MD  07/10/25 0720

## 2025-07-08 NOTE — PROGRESS NOTES
ASSESSMENT and PLAN:    -   89-year-old female at risk for pneumonia or other causes of systemic illness.      Plan:   Discussed that she was in need of a chest x-ray and acute labs with possibly blood cultures and also urinalysis and possibly an EKG.      Discussed that we could not do this evaluation in our family medicine clinic where we are unable to get back labs immediately and do not have an x-ray machine.      Suggested that they go to the emergency room for full evaluation.    Jenn and her 2 sons were in agreement with the plan.        Gabe Etienne MD  Family Medicine and Sports Medicine  [facility]      Medical assistant intake:  Hailey Alexis is a 89 year old female who presents to [facility] today for Fatigue (Since Friday July 4, had a hard time getting to the window and has been really tired since then), URI (Cold symnptoms- fever(100.7 temp yesterday night) but by midnight it went down, chills, sleeping a lot almost a lot, she slept in [employer] Sunday ), Anorexia (Low appetite, Saturday had basically half a hamburger and usually she would eat whole hamburger and have something else by the rest of the day. This morning she did have eggs.), and Breathing Problem (Last night had labored breathing and wet cough )      SUBJECTIVE:    Jenn  is a   89-year-old woman who I have seen for the past few years.  Her health has been declining over the past year.  She is brought in by both of her sons, Agus and Dre,  as  she has been suffering from illness for the past 4 days.  Starting on July 4 she started having trouble getting to the window.  She has also started having chills and being excessively tired.  She has had some labored breathing and a wet cough.  She is due to travel to [place] for a wedding in about a week from now.  Her temperature at home last night was in the 100.7.    She denies any urinary symptoms.    Review Of Systems:    See HPI.    Problem list per EMR:  Patient  Active Problem List   Diagnosis    Osteoarthritis of right knee, unspecified osteoarthritis type    Cerebrovascular accident (H)    Chronic atrial fibrillation (H)    Hard of hearing    Cellulitis of left upper extremity    Cat bite, initial encounter    Osteoporosis       Current Outpatient Medications   Medication Sig Dispense Refill    alendronate (FOSAMAX) 70 MG tablet Take 1 tablet (70 mg) by mouth every 7 days. 12 tablet 2    Calcium Carbonate-Vitamin D 600-10 MG-MCG TABS Take 2 tablets by mouth daily. 180 tablet 3    ELIQUIS ANTICOAGULANT 2.5 MG tablet Take 1 tablet (2.5 mg) by mouth 2 times daily. 60 tablet 11    metoprolol succinate ER (TOPROL XL) 25 MG 24 hr tablet Take 1 tablet (25 mg) by mouth daily. 90 tablet 3    Multiple Vitamins-Minerals (PRESERVISION AREDS 2) CAPS Take 1 capsule by mouth daily. 90 capsule 3       Allergies   Allergen Reactions    Penicillin G Rash     Patient reports rash not requiring hospitalization in ~2000. Doesn't remember specifics but no tongue/throat swelling and no difficulty breathing. Reviewed with her 4/14/24.  Tolerated cefpodoxime 4/14/24, ceftriaxone 4/15        OBJECTIVE    Vitals: /70   Pulse 103   SpO2 (!) 84%   BMI= There is no height or weight on file to calculate BMI.    She  is sitting in a wheelchair and appears slightly more frail but essentially in her usual state of health.  Her heart rhythm is irregular at about 110 bpm.  No murmurs are heard.   Her lungs have some crackles in the right lower lung fields.  Extremities are without edema.    SEE TOP OF NOTE FOR ASSESSMENT AND PLAN    --Gabe Etienne MD  [facility], Department of Family Medicine and Community Health

## 2025-07-08 NOTE — PHARMACY-VANCOMYCIN DOSING SERVICE
Pharmacy Vancomycin Initial Note  Date of Service 2025  Patient's  2/10/1930  89 year old, female    Indication: Sepsis    Current estimated CrCl = Estimated Creatinine Clearance: 44.8 mL/min (based on SCr of 0.76 mg/dL).    Creatinine for last 3 days  2025:  1:31 PM Creatinine 0.76 mg/dL    Recent Vancomycin Level(s) for last 3 days  No results found for requested labs within last 3 days.      Vancomycin IV Administrations (past 72 hours)        No vancomycin orders with administrations in past 72 hours.                    Nephrotoxins and other renal medications (From now, onward)      Start     Dose/Rate Route Frequency Ordered Stop    25 1600  vancomycin (VANCOCIN) 1,250 mg in 0.9% NaCl 250 mL intermittent infusion         1,250 mg  over 90 Minutes Intravenous EVERY 24 HOURS 25 1522              Contrast Orders - past 72 hours (72h ago, onward)      None            [employer] Prediction of Planned Initial Vancomycin Regimen  Loading dose: N/A  Regimen: 1250 mg IV every 24 hours.  Start time: 15:21 on 2025  Exposure target: AUC24 (range) 400-600 mg/L.hr   AUC24,ss: 504 mg/L.hr  Probability of AUC24 > 400: 74 %  Ctrough,ss: 15.3 mg/L  Probability of Ctrough,ss > 20: 28 %  Probability of nephrotoxicity (Lodise MABLE ): 11 %        Plan:  Start vancomycin  1250 mg IV q24h.   Vancomycin monitoring method: AUC  Vancomycin therapeutic monitoring goal: 400-600 mg*h/L  Pharmacy will check vancomycin levels as appropriate in 3-5 Days.    Serum creatinine levels will be ordered daily for the first week of therapy and at least twice weekly for subsequent weeks.      Aviva Johnson, PharmD, BCEMP, BCCCP, BCPS

## 2025-07-08 NOTE — ED TRIAGE NOTES
Patient wheelchair to triage from home with son. Patient has been fatigued with shortness of breath and a cough starting Friday. Per son fever last night. Patient placed on 3L nasal cannula in triage now at 94%.      Triage Assessment (Adult)       Row Name 07/08/25 1237          Triage Assessment    Airway WDL WDL        Respiratory WDL    Respiratory WDL X;all     Rhythm/Pattern, Respiratory shortness of breath     Cough Frequency frequent     Cough Type congested        Skin Circulation/Temperature WDL    Skin Circulation/Temperature WDL WDL        Cardiac WDL    Cardiac WDL X;rhythm     Pulse Rate & Regularity tachycardic        Peripheral/Neurovascular WDL    Peripheral Neurovascular WDL WDL        Cognitive/Neuro/Behavioral WDL    Cognitive/Neuro/Behavioral WDL WDL

## 2025-07-08 NOTE — NURSING NOTE
Jenn  89 year old    Chief Complaint   Patient presents with    Fatigue     Since Friday July 4, had a hard time getting to the window and has been really tired since then    URI     Cold symnptoms- fever(100.7 temp yesterday night) but by midnight it went down, chills, sleeping a lot almost a lot, she slept in [employer] Sunday     Anorexia     Low appetite, Saturday had basically half a hamburger and usually she would eat whole hamburger and have something else by the rest of the day. This morning she did have eggs.    Breathing Problem     Last night had labored breathing and wet cough            Blood pressure 113/70, pulse 103, SpO2 (!) 84%. There is no height or weight on file to calculate BMI.    Patient Active Problem List   Diagnosis    Osteoarthritis of right knee, unspecified osteoarthritis type    Cerebrovascular accident (H)    Chronic atrial fibrillation (H)    Hard of hearing    Cellulitis of left upper extremity    Cat bite, initial encounter    Osteoporosis             Wt Readings from Last 2 Encounters:   05/19/25 79.8 kg (176 lb)   11/11/24 71.2 kg (157 lb)       BP Readings from Last 3 Encounters:   07/08/25 113/70   05/19/25 133/72   11/11/24 120/81                Current Outpatient Medications   Medication Sig Dispense Refill    alendronate (FOSAMAX) 70 MG tablet Take 1 tablet (70 mg) by mouth every 7 days. 12 tablet 2    Calcium Carbonate-Vitamin D 600-10 MG-MCG TABS Take 2 tablets by mouth daily. 180 tablet 3    ELIQUIS ANTICOAGULANT 2.5 MG tablet Take 1 tablet (2.5 mg) by mouth 2 times daily. 60 tablet 11    metoprolol succinate ER (TOPROL XL) 25 MG 24 hr tablet Take 1 tablet (25 mg) by mouth daily. 90 tablet 3    Multiple Vitamins-Minerals (PRESERVISION AREDS 2) CAPS Take 1 capsule by mouth daily. 90 capsule 3     No current facility-administered medications for this visit.             Social History     Tobacco Use    Smoking status: Former     Types: Cigarettes    Smokeless tobacco: Never  "  Substance Use Topics    Alcohol use: Yes     Alcohol/week: 1.0 standard drink of alcohol     Types: 1 Standard drinks or equivalent per week             Health Maintenance Due   Topic Date Due    COVID-19 VACCINE (8 - 2024-25 season) 05/11/2025           No results found for: \"PAP\"           July 8, 2025 11:37 AM    "

## 2025-07-09 ENCOUNTER — APPOINTMENT (OUTPATIENT)
Dept: CARDIOLOGY | Facility: CLINIC | Age: OVER 89
DRG: 871 | End: 2025-07-09
Payer: COMMERCIAL

## 2025-07-09 ENCOUNTER — APPOINTMENT (OUTPATIENT)
Dept: PHYSICAL THERAPY | Facility: CLINIC | Age: OVER 89
End: 2025-07-09
Attending: HOSPITALIST
Payer: COMMERCIAL

## 2025-07-09 LAB
ANION GAP SERPL CALCULATED.3IONS-SCNC: 10 MMOL/L (ref 7–15)
BACTERIA UR CULT: ABNORMAL
BACTERIA UR CULT: ABNORMAL
BUN SERPL-MCNC: 19.1 MG/DL (ref 8–23)
CALCIUM SERPL-MCNC: 8.3 MG/DL (ref 8.8–10.4)
CHLORIDE SERPL-SCNC: 103 MMOL/L (ref 98–107)
CREAT SERPL-MCNC: 0.67 MG/DL (ref 0.51–0.95)
CRP SERPL-MCNC: 174.28 MG/L
EGFRCR SERPLBLD CKD-EPI 2021: 80 ML/MIN/1.73M2
ERYTHROCYTE [DISTWIDTH] IN BLOOD BY AUTOMATED COUNT: 14.3 % (ref 10–15)
GLUCOSE SERPL-MCNC: 91 MG/DL (ref 70–99)
HCO3 SERPL-SCNC: 24 MMOL/L (ref 22–29)
HCT VFR BLD AUTO: 38.9 % (ref 35–47)
HGB BLD-MCNC: 13 G/DL (ref 11.7–15.7)
L PNEUMO1 AG UR QL IA: NEGATIVE
LVEF ECHO: NORMAL
MAGNESIUM SERPL-MCNC: 1.9 MG/DL (ref 1.7–2.3)
MCH RBC QN AUTO: 30.7 PG (ref 26.5–33)
MCHC RBC AUTO-ENTMCNC: 33.4 G/DL (ref 31.5–36.5)
MCV RBC AUTO: 92 FL (ref 78–100)
PHOSPHATE SERPL-MCNC: 2.8 MG/DL (ref 2.5–4.5)
PLATELET # BLD AUTO: 278 10E3/UL (ref 150–450)
POTASSIUM SERPL-SCNC: 4 MMOL/L (ref 3.4–5.3)
RBC # BLD AUTO: 4.24 10E6/UL (ref 3.8–5.2)
S PNEUM AG SPEC QL: NEGATIVE
SODIUM SERPL-SCNC: 137 MMOL/L (ref 135–145)
SPECIMEN TYPE: NORMAL
WBC # BLD AUTO: 19 10E3/UL (ref 4–11)

## 2025-07-09 PROCEDURE — 94640 AIRWAY INHALATION TREATMENT: CPT | Mod: 76

## 2025-07-09 PROCEDURE — 999N000040 HC STATISTIC CONSULT NO CHARGE VASC ACCESS

## 2025-07-09 PROCEDURE — 93306 TTE W/DOPPLER COMPLETE: CPT | Mod: 26 | Performed by: INTERNAL MEDICINE

## 2025-07-09 PROCEDURE — 999N000208 ECHOCARDIOGRAM COMPLETE

## 2025-07-09 PROCEDURE — 999N000157 HC STATISTIC RCP TIME EA 10 MIN

## 2025-07-09 PROCEDURE — 84100 ASSAY OF PHOSPHORUS: CPT | Performed by: HOSPITALIST

## 2025-07-09 PROCEDURE — 255N000002 HC RX 255 OP 636: Performed by: INTERNAL MEDICINE

## 2025-07-09 PROCEDURE — 250N000011 HC RX IP 250 OP 636: Performed by: PHYSICIAN ASSISTANT

## 2025-07-09 PROCEDURE — 87899 AGENT NOS ASSAY W/OPTIC: CPT | Performed by: STUDENT IN AN ORGANIZED HEALTH CARE EDUCATION/TRAINING PROGRAM

## 2025-07-09 PROCEDURE — 85027 COMPLETE CBC AUTOMATED: CPT | Performed by: HOSPITALIST

## 2025-07-09 PROCEDURE — 80048 BASIC METABOLIC PNL TOTAL CA: CPT | Performed by: HOSPITALIST

## 2025-07-09 PROCEDURE — 97161 PT EVAL LOW COMPLEX 20 MIN: CPT | Mod: GP | Performed by: PHYSICAL THERAPIST

## 2025-07-09 PROCEDURE — 83735 ASSAY OF MAGNESIUM: CPT | Performed by: HOSPITALIST

## 2025-07-09 PROCEDURE — 250N000013 HC RX MED GY IP 250 OP 250 PS 637: Performed by: HOSPITALIST

## 2025-07-09 PROCEDURE — 250N000009 HC RX 250

## 2025-07-09 PROCEDURE — 250N000011 HC RX IP 250 OP 636: Performed by: STUDENT IN AN ORGANIZED HEALTH CARE EDUCATION/TRAINING PROGRAM

## 2025-07-09 PROCEDURE — 250N000011 HC RX IP 250 OP 636: Performed by: HOSPITALIST

## 2025-07-09 PROCEDURE — 94640 AIRWAY INHALATION TREATMENT: CPT

## 2025-07-09 PROCEDURE — 97530 THERAPEUTIC ACTIVITIES: CPT | Mod: GP | Performed by: PHYSICAL THERAPIST

## 2025-07-09 PROCEDURE — 250N000013 HC RX MED GY IP 250 OP 250 PS 637: Performed by: STUDENT IN AN ORGANIZED HEALTH CARE EDUCATION/TRAINING PROGRAM

## 2025-07-09 PROCEDURE — 120N000002 HC R&B MED SURG/OB UMMC

## 2025-07-09 PROCEDURE — 86140 C-REACTIVE PROTEIN: CPT

## 2025-07-09 PROCEDURE — 36415 COLL VENOUS BLD VENIPUNCTURE: CPT | Performed by: HOSPITALIST

## 2025-07-09 PROCEDURE — 99232 SBSQ HOSP IP/OBS MODERATE 35: CPT | Performed by: STUDENT IN AN ORGANIZED HEALTH CARE EDUCATION/TRAINING PROGRAM

## 2025-07-09 RX ORDER — VIT C/E/ZN/COPPR/LUTEIN/ZEAXAN 60 MG-6 MG
1 CAPSULE ORAL DAILY
Status: DISCONTINUED | OUTPATIENT
Start: 2025-07-09 | End: 2025-07-12 | Stop reason: HOSPADM

## 2025-07-09 RX ORDER — CALCIUM CARBONATE/VITAMIN D3 600 MG-10
2 TABLET ORAL DAILY
Status: DISCONTINUED | OUTPATIENT
Start: 2025-07-09 | End: 2025-07-12 | Stop reason: HOSPADM

## 2025-07-09 RX ORDER — ERTAPENEM 1 G/1
1 INJECTION, POWDER, LYOPHILIZED, FOR SOLUTION INTRAMUSCULAR; INTRAVENOUS EVERY 24 HOURS
Status: DISCONTINUED | OUTPATIENT
Start: 2025-07-09 | End: 2025-07-10

## 2025-07-09 RX ORDER — EPINEPHRINE 0.3 MG/.3ML
0.3 INJECTION SUBCUTANEOUS
Status: DISCONTINUED | OUTPATIENT
Start: 2025-07-09 | End: 2025-07-12 | Stop reason: HOSPADM

## 2025-07-09 RX ORDER — DIPHENHYDRAMINE HYDROCHLORIDE 50 MG/ML
25 INJECTION, SOLUTION INTRAMUSCULAR; INTRAVENOUS
Status: DISCONTINUED | OUTPATIENT
Start: 2025-07-09 | End: 2025-07-12 | Stop reason: HOSPADM

## 2025-07-09 RX ORDER — CEFTRIAXONE 2 G/1
2 INJECTION, POWDER, FOR SOLUTION INTRAMUSCULAR; INTRAVENOUS EVERY 24 HOURS
Status: DISCONTINUED | OUTPATIENT
Start: 2025-07-09 | End: 2025-07-09

## 2025-07-09 RX ADMIN — APIXABAN 2.5 MG: 2.5 TABLET, FILM COATED ORAL at 20:39

## 2025-07-09 RX ADMIN — METOPROLOL SUCCINATE 25 MG: 25 TABLET, EXTENDED RELEASE ORAL at 09:31

## 2025-07-09 RX ADMIN — CALCIUM CARBONATE 600 MG (1,500 MG)-VITAMIN D3 400 UNIT TABLET 2 TABLET: at 09:31

## 2025-07-09 RX ADMIN — CEFEPIME 2 G: 2 INJECTION, POWDER, FOR SOLUTION INTRAVENOUS at 03:58

## 2025-07-09 RX ADMIN — LEVALBUTEROL HYDROCHLORIDE 1.25 MG: 1.25 SOLUTION RESPIRATORY (INHALATION) at 03:50

## 2025-07-09 RX ADMIN — HUMAN ALBUMIN MICROSPHERES AND PERFLUTREN 6 ML (DILUTED): 10; .22 INJECTION, SOLUTION INTRAVENOUS at 11:14

## 2025-07-09 RX ADMIN — Medication 1 CAPSULE: at 09:31

## 2025-07-09 RX ADMIN — LEVALBUTEROL HYDROCHLORIDE 1.25 MG: 1.25 SOLUTION RESPIRATORY (INHALATION) at 19:16

## 2025-07-09 RX ADMIN — CEFTRIAXONE 2 G: 2 INJECTION, POWDER, FOR SOLUTION INTRAMUSCULAR; INTRAVENOUS at 08:34

## 2025-07-09 RX ADMIN — ERTAPENEM SODIUM 1 G: 1 INJECTION, POWDER, LYOPHILIZED, FOR SOLUTION INTRAMUSCULAR; INTRAVENOUS at 23:11

## 2025-07-09 RX ADMIN — APIXABAN 2.5 MG: 2.5 TABLET, FILM COATED ORAL at 09:31

## 2025-07-09 ASSESSMENT — ACTIVITIES OF DAILY LIVING (ADL)
ADLS_ACUITY_SCORE: 55
ADLS_ACUITY_SCORE: 52
ADLS_ACUITY_SCORE: 55
ADLS_ACUITY_SCORE: 61
ADLS_ACUITY_SCORE: 52
ADLS_ACUITY_SCORE: 55
ADLS_ACUITY_SCORE: 52
ADLS_ACUITY_SCORE: 61
ADLS_ACUITY_SCORE: 52
ADLS_ACUITY_SCORE: 55
ADLS_ACUITY_SCORE: 52
ADLS_ACUITY_SCORE: 55
ADLS_ACUITY_SCORE: 52
ADLS_ACUITY_SCORE: 52
ADLS_ACUITY_SCORE: 55
ADLS_ACUITY_SCORE: 52
ADLS_ACUITY_SCORE: 55

## 2025-07-09 NOTE — PROGRESS NOTES
07/09/25 1426   Appointment Info   Signing Clinician's Name / Credentials (PT) Uriel Eller DPT   Living Environment   People in Home other (see comments)  (Pt lives with roomate who cooks meals and assist around house and personal care asssitance that help during day.)   Current Living Arrangements condominium   Home Accessibility no concerns   Transportation Anticipated family or friend will provide   Living Environment Comments Pt/family report she recieves assistance with all functional mobility including grooming and transfers and minimally ambulates throughout the house but uses W/C for out of home mobility.   Self-Care   Usual Activity Tolerance moderate   Current Activity Tolerance moderate   Regular Exercise No   Equipment Currently Used at Home walker, rolling;wheelchair, manual   Fall history within last six months no   General Information   Onset of Illness/Injury or Date of Surgery 07/08/25   Referring Physician Pancho Jones MD   Patient/Family Therapy Goals Statement (PT) Pt and family report they would like her to return home once medically appropriate   Pertinent History of Current Problem (include personal factors and/or comorbidities that impact the POC) Per chart: 89 year old female admitted on 7/8/2025. She has a history of CVA, atrial fibrillation on Eliquis, osteoarthritis of right knee, osteoporosis and is admitted for hypoxia and concern for sepsis of uncertain etiology.   Existing Precautions/Restrictions fall   Cognition   Affect/Mental Status (Cognition) WFL   Orientation Status (Cognition) oriented x 4   Follows Commands (Cognition) WFL   Pain Assessment   Patient Currently in Pain No   Integumentary/Edema   Integumentary/Edema no deficits were identifed   Posture    Posture Forward head position;Protracted shoulders   Range of Motion (ROM)   Range of Motion ROM is WFL   Strength (Manual Muscle Testing)   Strength (Manual Muscle Testing) Able to perform L SLR;Able to perform R  SLR;Deficits observed during functional mobility   Strength Comments Gross bilateral LE strength 4/5   Bed Mobility   Bed Mobility bed mobility (all) activities   All Activities, [place] (Bed Mobility) verbal cues;minimum assist (75% patient effort)   Impairments Contributing to Impaired Bed Mobility decreased strength   Assistive Device (Bed Mobility) bed rails   Transfers   Transfers sit-stand transfer   Transfer Safety Concerns Noted losing balance backward   Impairments Contributing to Impaired Transfers impaired balance;decreased strength   Sit-Stand Transfer   Sit-Stand [place] (Transfers) verbal cues;nonverbal cues (demo/gesture);minimum assist (75% patient effort)   Assistive Device (Sit-Stand Transfers) other (see comments)  (HHA)   Gait/Stairs (Locomotion)   [place] Level (Gait) verbal cues;contact guard   Assistive Device (Gait) other (see comments)  (HHA)   Distance in Feet (Gait) 15   Pattern (Gait) step-through   Deviations/Abnormal Patterns (Gait) stride length decreased;barry decreased   Balance   Balance other (describe)   Balance Comments Pt with decreased reaction time with posterior LOB during initial stand  and decreased weight shifting with dynamic standing.   Sensory Examination   Sensory Perception WFL   Coordination   Coordination no deficits were identified   Muscle Tone   Muscle Tone no deficits were identified   Clinical Impression   Criteria for Skilled Therapeutic Intervention Yes, treatment indicated   PT Diagnosis (PT) Difficulty with walking   Influenced by the following impairments Generalize weakness   Functional limitations due to impairments decreased gait mechanics and balance   Clinical Presentation (PT Evaluation Complexity) stable   Clinical Presentation Rationale Clinical judgement   Clinical Decision Making (Complexity) low complexity   Planned Therapy Interventions (PT) gait training;balance training;bed mobility training;neuromuscular  re-education;transfer training;strengthening;stair training   Risk & Benefits of therapy have been explained evaluation/treatment results reviewed;care plan/treatment goals reviewed;risks/benefits reviewed;current/potential barriers reviewed;participants voiced agreement with care plan;participants included;patient   Clinical Impression Comments Pt presents with increased need for asssistance with transfers and amublation. However, patient and family report she is close to her baseline and is safe to return home with appropriate assistance. PT will continue to work with patient to progress safety/balance and ensure safe and appropriate discharge.   PT Total Evaluation Time   PT Eval, Low Complexity Minutes (71431) 8   Physical Therapy Goals   PT Frequency 5x/week   PT Predicted Duration/Target Date for Goal Attainment 08/08/25   PT Goals Transfers;Gait;Bed Mobility   PT: Bed Mobility Supervision/stand-by assist;Supine to/from sit;Rolling;Bridging   PT: Transfers Supervision/stand-by assist;Sit to/from stand;Assistive device   PT: Gait 100 feet;Assistive device;Supervision/stand-by assist   Interventions   Interventions Quick Adds Therapeutic Activity   Therapeutic Activity   Therapeutic Activities: dynamic activities to improve functional performance Minutes (52178) 12   Symptoms Noted During/After Treatment None   Treatment Detail/Skilled Intervention Pt recieved and left supine in bed. Pt performed supine<>sit transfer and sit to stand transfers with min assistance and cuing for weight shifting and extremity placement. Pt then am bulated in room with hand held assistance from therapist and family and cuing for obstacle avoidance. Pt and family educated on safe mobility during admission and progression for home. pt and family report no concerns with discharge home once medically appropriate.   PT Discharge Planning   PT Plan progress LE strength/balance/ambulation distance   PT Discharge Recommendation (DC Rec)  home with assist   PT Rationale for DC Rec Pt reports she is close to her baseline at this time   PT Brief overview of current status Ax2 with ambulation   PT Total Distance Amb During Session (feet) 15   Physical Therapy Time and Intention   Timed Code Treatment Minutes 12   Total Session Time (sum of timed and untimed services) 20

## 2025-07-09 NOTE — PROGRESS NOTES
[facility]    Medicine Progress Note - Hospitalist Service, GOLD TEAM 22    Date of Admission:  7/8/2025    Assessment & Plan   Hailey Alexis is a 89 year old female admitted on 7/8/2025. She has a history of CVA, atrial fibrillation on Eliquis, osteoarthritis of right knee, osteoporosis and is admitted for hypoxia and concern for sepsis of uncertain etiology.     Plan today:   - Follow up TTE   - Follow up urine culture  - Narrow to ceftriaxone; stopped Cefepime given no PsA risk factors  - Low threshold for CT chest vs CT PA    Concern for sepsis  Leukocytosis   Presents with 4 days of chills, fatigue, productive cough and dyspnea. Apparently had temp of 100.7 at home. CXR without focal airspace consolidation. Noted UA with large LE and large WBC and RBCs, culture pending. Differential diagnosis includes likely UTI, though this would not account for her dyspnea. Consideration for viral myocarditis, though troponin WNL. RVP negative. Will plan to treat for UTI pending urine culture and monitor clinically with low threshold for further workup.   - MRSA nasal swab negative - Vancomycin discontinued   - RVP and COVID/flu negative   - Follow up blood culture 7/8 - NGTD   - Follow up urine culture   - Narrow to ceftriaxone; stop cefepime 7/9    Hypoxic respiratory failure, improved   Expiratory wheezing, improved   Differential diagnosis including heart failure given elevated BNP and dyspnea and wet cough vs bronchospasm vs infectious process. Troponin WNL, low concern for anginal equivalent without chest pain. Negative respiratory viral panel. No consolidation on CXR, though bacterial pneumonia remains in the differential. Clot remains on the differential though less likely given she is on DOAC, though per son, she refuses this about once per week and may not be therapeutic. D dimer mildly elevated but within normal range when adjusted for age. She clinically  feels improved.    - PRN xopenex   - Follow up TTE   - Follow up urine legionella (not on atypical coverage currently)  - Low threshold for CT chest vs CT PA if clinical worsening     Atrial fibrillation   Hx CVA   EKG apparently completed but not uploaded. Rate controlled. Per son, the patient does intermittently decline DOAC (about once per week) and may not be therapeutic on this. Discussed risk vs benefit of ongoing AC could be discussed with PCP outpatient of concern for pill burden, age, fall risk etc.    - PTA metoprolol succinate 25mg PO daily    - PTA apixaban     OA: Holding PTA fosamax, continue calcium/D          Diet: Combination Diet Regular Diet Adult    DVT Prophylaxis: DOAC  Snyder Catheter: Not present  Lines: None     Cardiac Monitoring: None  Code Status: No CPR- Do NOT Intubate      Clinically Significant Risk Factors Present on Admission               # Hypoalbuminemia: Lowest albumin = 3.4 g/dL at 7/8/2025  1:31 PM, will monitor as appropriate  # Drug Induced Coagulation Defect: home medication list includes an anticoagulant medication                  # Financial/Environmental Concerns:           Social Drivers of Health    Tobacco Use: Medium Risk (7/8/2025)    Patient History     Smoking Tobacco Use: Former     Smokeless Tobacco Use: Never   Physical Activity: Not on File (4/6/2022)    Received from [employer]    Physical Activity     Physical Activity: 0   Stress: Not on File (4/6/2022)    Received from [employer]    Stress     Stress: 0   Social Connections: Not on File (9/13/2024)    Received from [employer]    Social Connections     Connectedness: 0          Disposition Plan     Medically Ready for Discharge: Anticipated Tomorrow           Inés Silveira DO  Hospitalist Service, GOLD TEAM [address] [place] [address]  Securely message with [employer] (more info)  Text page via [facility] Paging/Directory   See signed in provider for up to date coverage  information  ______________________________________________________________________    Interval History   No acute intervention. Her breathing and cough feel improved this AM. She denies chest pain, exertional chest pain or dyspnea, no overt orthopnea as of late, no leg pain or swelling. She does not think she has had any urinary symptoms and denies suprapubic pain. Son unsure whether she has had urgency or frequency as she often wears briefs.     Physical Exam   Vital Signs: Temp: 98.2  F (36.8  C) Temp src: Oral BP: 117/63 Pulse: 103   Resp: 20 SpO2: 94 % O2 Device: Nasal cannula Oxygen Delivery: 2 LPM  Weight: 178 lbs 5.63 oz    General: elderly female, hard of hearing, no acute distress, family at the bedside, eating breakfast.   HEENT: NC in place.   CV: irregularly irregular, no tachycardia. Extremities are warm and well perfused.   LUNGS: CTAB, no obvious crackles appreciated, no wheezing.   ABD: Soft, NT/ND, NBS, no masses or organomegaly. No suprapubic tenderness.   SKIN: Warm, well perfused. No skin rashes. Area of ecchymosis outlined over the R antecubital fossa and prior IV site   MSK: No deformities.No clubbing, cyanosis, or edema.  NEURO: Hard of hearing. Alert and answering questions appropriately. No focal deficits.    Medical Decision Making         Data     I have personally reviewed the following data over the past 24 hrs:    19.0 (H)  \   13.0   / 278     137 103 19.1 /  91   4.0 24 0.67 \     ALT: N/A AST: N/A AP: 221 (H) TBILI: 1.4 (H)   ALB: 3.4 (L) TOT PROTEIN: 7.7 LIPASE: N/A     Trop: 12 BNP: 3,185 (H)     Procal: 0.33 CRP: 174.28 (H) Lactic Acid: 1.7       INR:  N/A PTT:  N/A   D-dimer:  0.89 (H) Fibrinogen:  N/A       Imaging results reviewed over the past 24 hrs:   Recent Results (from the past 24 hours)   XR Chest 2 Views    Narrative    Exam: XR CHEST 2 VIEWS, 7/8/2025 3:14 PM    Comparison: None    History: cough, fatigue    Findings:  AP and lateral views of the upright chest.  Trachea is midline.  Calcifications of the aortic knob. Cardiomediastinal silhouette is  within normal limits. Bilateral perihilar and bibasilar streaky  opacities, in the setting of low lung volumes. No pneumothorax or  pleural effusion. The visualized upper abdomen is unremarkable. No  acute osseous abnormalities.      Impression    Impression:   Low lung volumes with atelectasis. No focal airspace consolidation.    I have personally reviewed the examination and initial interpretation  and I agree with the findings.    INA CADENA MD         SYSTEM ID:  [id number]

## 2025-07-09 NOTE — PLAN OF CARE
Goal Outcome Evaluation:      Plan of Care Reviewed With: patient, family    Overall Patient Progress: improving    Outcome Evaluation: Pt tolerating IV antibiotics, family at bedside most of this shift.      O2: Stating over 90% on RA   Output: Incontinent, uses pure wick    Last BM: PTA   Activity: Assist x2 with transfers   Skin: X right arm w extravasation   Pain: Denies   CMS: A/O with intermittent confusion, [place], uses hearing aids   Dressing: none   Diet: Regular   LDA: 2 R PIV saline locked   Equipment: IV pole, recliner   Plan: IV antibiotic therapy, continue with POC   Additional Info: Pt is calm and cooperative with cares. Denies CP, SOB, N/V. Son at bedside most of the shift involved in patients' care.

## 2025-07-09 NOTE — PLAN OF CARE
8MS Admission Note    Reason for admission: Acute cystitis w/o hematuria  Primary team notified of pt arrival.  Admitted from: [facility]  Via: ambulance  Accompanied by: EMS  Belongings: Placed in closet; valuables sent home with family  Admission Required Doc Completed: Yes  Mobility Devices Utilized by Patient Provided (i.e. walker, wheelchair, etc.): Wheel chair brought from home  Teaching: Orientation to unit and call light- call light within reach, use of console, meal times, when to call for the RN, and enforced importance of safety.  IV Access: R/PIV  Telemetry: No  Ht./Wt.: Completed  Code Status verified on armband: Yes  2 RN Skin Assessment Completed with: MANNY Estrada  Suction/Ambu bag/Flowmeter at bedside: Yes/no    Pt status:  /63   Pulse 103   Temp 98.2  F (36.8  C) (Oral)   Resp 20   Wt 80.9 kg (178 lb 5.6 oz)   SpO2 94%   BMI 31.09 kg/m       Goal Outcome Evaluation:      Plan of Care Reviewed With: patient    Overall Patient Progress: no changeOverall Patient Progress: no change       A&Ox3, [place]. CMS intact. Denies any discomfort. Not OOB this evening, able to reposition w/minimal assist, WC/walker at baseline.utilizing purewick d/t urgency. Patient came in w/extravasation to right arm, marked with purple pen, compress PRN, and elevated.    Shift event: Patient placed on oxygen SpO2 88%-96% on RA. Placed on 1L via NC SpO2 at >94%. Utilized PRN nebulizer for wheezing. Weaned oxygen as able.  Continue with POC.

## 2025-07-09 NOTE — CONSULTS
"Consult received for Vascular Access Team.  PIV obtained by flyer RN. For additional needs place \"Consult for Inpatient Vascular Access Care\"  [id number] order in EPIC.  "

## 2025-07-09 NOTE — H&P
[facility]    History and Physical - Medicine Service, [facility] TEAM 4       Date of Admission:  7/8/2025    Assessment & Plan      Hailey Alexis is a 89 year old female admitted on 7/8/2025. She has a history of CVA, atrial fibrillation on Eliquis, osteoarthritis of right knee, osteoporosis and is admitted for hypoxia and suspected infection.     There was initial concern for sepsis given reported fever and leukocytosis. UA was dirty but no urinary symptoms. Patient did have mild hypoxia but resolved. D-dimer was negative when age adjusted and CT was cancelled.     Elevated CRP and WBC but normal Procal could suggest viral infection which fits with course. Will narrow abx and follow cultures as well as check an RVP.     Concern for Infection, UTI vs Viral   Leukocytosis   -Stop Vanco  -Continue Cefepime over night but either narrow or discontinue pending course      Hypoxia, resolved  Wheeze  -PRN xopenex  - D dimer negative, defer CT PE   -RVP pending     Afib  -PTA metop  -PTA Apixaban            Diet: Combination Diet Regular Diet Adult    DVT Prophylaxis: DOAC  Snyder Catheter: Not present  Fluids: Bolus  Lines: None     Cardiac Monitoring: None  Code Status: No CPR- Do NOT Intubate      Clinically Significant Risk Factors Present on Admission               # Hypoalbuminemia: Lowest albumin = 3.4 g/dL at 7/8/2025  1:31 PM, will monitor as appropriate  # Drug Induced Coagulation Defect: home medication list includes an anticoagulant medication                  # Financial/Environmental Concerns:           Disposition Plan      Expected Discharge Date: 07/12/2025                The patient's care was discussed with the Attending Physician, Dr. Jones.      Alberto Ayala MD  Medicine Service, [facility] TEAM 4  [facility]  Securely message with [employer] (more info)  Text page via [employer] Paging/Directory   See  signed in provider for up to date coverage information  ______________________________________________________________________    Chief Complaint    Fatigue (Since Friday July 4, had a hard time getting to the window and has been really tired since then), URI (Cold symnptoms- fever(100.7 temp yesterday night) but by midnight it went down, chills, sleeping a lot almost a lot, she slept in [place] Sunday ), Anorexia (Low appetite, Saturday had basically half a hamburger and usually she would eat whole hamburger and have something else by the rest of the day. This morning she did have eggs.), and Breathing Problem (Last night had labored breathing and wet cough )       History is obtained from the patient, electronic health record, and patient's son    History of Present Illness   Hailey Alexis is a 89 year old female who has a history of of CVA, atrial fibrillation on Eliquis, osteoarthritis of right knee, osteoporosis and is admitted for hypoxia and suspected infection.     Subacute course with mild progressive symptoms over the weekend and reported fever at home.     She was seen by her CP and told to present the the ER for X ray blood culture are EKG    On presentation there was concern for sepsis, cultures were drawn, fluids and abx givem      Past Medical History    Past Medical History:   Diagnosis Date    Cerebrovascular accident (H)     Chronic atrial fibrillation (H)     Hard of hearing        Past Surgical History   History reviewed. No pertinent surgical history.    Prior to Admission Medications   Prior to Admission Medications   Prescriptions Last Dose Informant Patient Reported? Taking?   Calcium Carbonate-Vitamin D 600-10 MG-MCG TABS   No No   Sig: Take 2 tablets by mouth daily.   ELIQUIS ANTICOAGULANT 2.5 MG tablet   No No   Sig: Take 1 tablet (2.5 mg) by mouth 2 times daily.   Multiple Vitamins-Minerals (PRESERVISION AREDS 2) CAPS   No No   Sig: Take 1 capsule by mouth daily.   alendronate  (FOSAMAX) 70 MG tablet   No No   Sig: Take 1 tablet (70 mg) by mouth every 7 days.   metoprolol succinate ER (TOPROL XL) 25 MG 24 hr tablet   No No   Sig: Take 1 tablet (25 mg) by mouth daily.      Facility-Administered Medications: None           Physical Exam   Vital Signs: Temp: 97.8  F (36.6  C) Temp src: Temporal BP: 112/88 Pulse: 101   Resp: 22 SpO2: 95 % O2 Device: Nasal cannula Oxygen Delivery: 1 LPM  Weight: 0 lbs 0 oz    General Appearance: Well appearing, in no distress, lying flat  Respiratory: diminished, scattered wheezes  Cardiovascular: Afib  GI: soft non tender  Skin: no rashes  Other: Hard of hearing, a & o x3      Medical Decision Making             Data     I have personally reviewed the following data over the past 24 hrs:    25.0 (H)  \   15.7   / 312     135 99 24.5 (H) /  121 (H)   5.0 24 0.76 \     ALT: N/A AST: N/A AP: 221 (H) TBILI: 1.4 (H)   ALB: 3.4 (L) TOT PROTEIN: 7.7 LIPASE: N/A     Trop: 12 BNP: 3,185 (H)     Procal: 0.33 CRP: 188.00 (H) Lactic Acid: 1.7       INR:  N/A PTT:  N/A   D-dimer:  0.89 (H) Fibrinogen:  N/A       Imaging results reviewed over the past 24 hrs:   Recent Results (from the past 24 hours)   XR Chest 2 Views    Narrative    Exam: XR CHEST 2 VIEWS, 7/8/2025 3:14 PM    Comparison: None    History: cough, fatigue    Findings:  AP and lateral views of the upright chest. Trachea is midline.  Calcifications of the aortic knob. Cardiomediastinal silhouette is  within normal limits. Bilateral perihilar and bibasilar streaky  opacities, in the setting of low lung volumes. No pneumothorax or  pleural effusion. The visualized upper abdomen is unremarkable. No  acute osseous abnormalities.      Impression    Impression:   Low lung volumes with atelectasis. No focal airspace consolidation.    I have personally reviewed the examination and initial interpretation  and I agree with the findings.    INA CADENA MD         SYSTEM ID:  [id number]

## 2025-07-09 NOTE — MEDICATION SCRIBE - ADMISSION MEDICATION HISTORY
Medication Scribe Admission Medication History    Admission medication history is complete. The information provided in this note is only as accurate as the sources available at the time of the update.    Information Source(s): Family member via in-person    Pertinent Information:   None  Changes made to PTA medication list:  Added: None  Deleted: None  Changed: None    Allergies reviewed with patient and updates made in EHR: yes    Medication History Completed By: Sherry Corey 7/8/2025 8:32 PM    PTA Med List   Medication Sig Last Dose/Taking    alendronate (FOSAMAX) 70 MG tablet Take 1 tablet (70 mg) by mouth every 7 days. Unknown    Calcium Carbonate-Vitamin D 600-10 MG-MCG TABS Take 2 tablets by mouth daily. 7/8/2025 Morning    ELIQUIS ANTICOAGULANT 2.5 MG tablet Take 1 tablet (2.5 mg) by mouth 2 times daily. 7/8/2025 Morning    metoprolol succinate ER (TOPROL XL) 25 MG 24 hr tablet Take 1 tablet (25 mg) by mouth daily. 7/8/2025 Morning    Multiple Vitamins-Minerals (PRESERVISION AREDS 2) CAPS Take 1 capsule by mouth daily. 7/8/2025 Morning       Principal Discharge DX:	Hypoxia  Goal:	Symptoms improved  Assessment and plan of treatment:	Aflutter s/p Ablation  Secondary Diagnosis:	Atrial flutter, unspecified type  Assessment and plan of treatment:	Aflutter s/p Ablation  Secondary Diagnosis:	Hypertension, essential  Assessment and plan of treatment:	Low salt diet  Activity as tolerated.  Take all medication as prescribed.  Follow up with your medical doctor for routine blood pressure monitoring at your next visit.  Notify your doctor if you have any of the following symptoms:   Dizziness, Lightheadedness, Blurry vision, Headache, Chest pain, Shortness of breath  Secondary Diagnosis:	Type 2 diabetes mellitus with complication, unspecified long term insulin use status  Assessment and plan of treatment:	HgA1C this admission.  Make sure you get your HgA1c checked every three months.  If you take oral diabetes medications, check your blood glucose two times a day.  If you take insulin, check your blood glucose before meals and at bedtime.  It's important not to skip any meals.  Keep a log of your blood glucose results and always take it with you to your doctor appointments.  Keep a list of your current medications including injectables and over the counter medications and bring this medication list with you to all your doctor appointments.  If you have not seen your ophthalmologist this year call for appointment.  Check your feet daily for redness, sores, or openings. Do not self treat. If no improvement in two days call your primary care physician for an appointment.  Low blood sugar (hypoglycemia) is a blood sugar below 70mg/dl. Check your blood sugar if you feel signs/symptoms of hypoglycemia. If your blood sugar is below 70 take 15 grams of carbohydrates (ex 4 oz of apple juice, 3-4 glucose tablets, or 4-6 oz of regular soda) wait 15 minutes and repeat blood sugar to make sure it comes up above 70.  If your blood sugar is above 70 and you are due for a meal, have a meal.  If you are not due for a meal have a snack.  This snack helps keeps your blood sugar at a safe range.  Secondary Diagnosis:	Mild dementia

## 2025-07-10 ENCOUNTER — APPOINTMENT (OUTPATIENT)
Dept: CT IMAGING | Facility: CLINIC | Age: OVER 89
End: 2025-07-10
Attending: STUDENT IN AN ORGANIZED HEALTH CARE EDUCATION/TRAINING PROGRAM
Payer: COMMERCIAL

## 2025-07-10 ENCOUNTER — APPOINTMENT (OUTPATIENT)
Dept: PHYSICAL THERAPY | Facility: CLINIC | Age: OVER 89
End: 2025-07-10
Payer: COMMERCIAL

## 2025-07-10 VITALS
BODY MASS INDEX: 31.02 KG/M2 | SYSTOLIC BLOOD PRESSURE: 128 MMHG | DIASTOLIC BLOOD PRESSURE: 63 MMHG | TEMPERATURE: 98.3 F | HEART RATE: 79 BPM | RESPIRATION RATE: 16 BRPM | OXYGEN SATURATION: 94 % | WEIGHT: 177.91 LBS

## 2025-07-10 LAB
ANION GAP SERPL CALCULATED.3IONS-SCNC: 12 MMOL/L (ref 7–15)
BACTERIA SPEC CULT: NORMAL
BACTERIA SPEC CULT: NORMAL
BASOPHILS # BLD AUTO: 0.1 10E3/UL (ref 0–0.2)
BASOPHILS NFR BLD AUTO: 1 %
BUN SERPL-MCNC: 14.7 MG/DL (ref 8–23)
CALCIUM SERPL-MCNC: 8.4 MG/DL (ref 8.8–10.4)
CHLORIDE SERPL-SCNC: 100 MMOL/L (ref 98–107)
CREAT SERPL-MCNC: 0.64 MG/DL (ref 0.51–0.95)
EGFRCR SERPLBLD CKD-EPI 2021: 81 ML/MIN/1.73M2
ELLIPTOCYTES BLD QL SMEAR: SLIGHT
EOSINOPHIL # BLD AUTO: 0.5 10E3/UL (ref 0–0.7)
EOSINOPHIL NFR BLD AUTO: 3 %
ERYTHROCYTE [DISTWIDTH] IN BLOOD BY AUTOMATED COUNT: 14.4 % (ref 10–15)
GLUCOSE SERPL-MCNC: 95 MG/DL (ref 70–99)
HCO3 SERPL-SCNC: 24 MMOL/L (ref 22–29)
HCT VFR BLD AUTO: 39.2 % (ref 35–47)
HGB BLD-MCNC: 13.1 G/DL (ref 11.7–15.7)
IMM GRANULOCYTES # BLD: 0.1 10E3/UL
IMM GRANULOCYTES NFR BLD: 1 %
LYMPHOCYTES # BLD AUTO: 1.4 10E3/UL (ref 0.8–5.3)
LYMPHOCYTES NFR BLD AUTO: 10 %
MCH RBC QN AUTO: 30.6 PG (ref 26.5–33)
MCHC RBC AUTO-ENTMCNC: 33.4 G/DL (ref 31.5–36.5)
MCV RBC AUTO: 92 FL (ref 78–100)
MONOCYTES # BLD AUTO: 1.7 10E3/UL (ref 0–1.3)
MONOCYTES NFR BLD AUTO: 12 %
NEUTROPHILS # BLD AUTO: 11 10E3/UL (ref 1.6–8.3)
NEUTROPHILS NFR BLD AUTO: 75 %
NRBC # BLD AUTO: 0 10E3/UL
NRBC BLD AUTO-RTO: 0 /100
PLAT MORPH BLD: ABNORMAL
PLATELET # BLD AUTO: 343 10E3/UL (ref 150–450)
POLYCHROMASIA BLD QL SMEAR: SLIGHT
POTASSIUM SERPL-SCNC: 4.2 MMOL/L (ref 3.4–5.3)
RBC # BLD AUTO: 4.28 10E6/UL (ref 3.8–5.2)
RBC MORPH BLD: ABNORMAL
SODIUM SERPL-SCNC: 136 MMOL/L (ref 135–145)
WBC # BLD AUTO: 14.7 10E3/UL (ref 4–11)

## 2025-07-10 PROCEDURE — 85004 AUTOMATED DIFF WBC COUNT: CPT | Performed by: STUDENT IN AN ORGANIZED HEALTH CARE EDUCATION/TRAINING PROGRAM

## 2025-07-10 PROCEDURE — 250N000013 HC RX MED GY IP 250 OP 250 PS 637: Performed by: STUDENT IN AN ORGANIZED HEALTH CARE EDUCATION/TRAINING PROGRAM

## 2025-07-10 PROCEDURE — 99223 1ST HOSP IP/OBS HIGH 75: CPT | Mod: FS | Performed by: STUDENT IN AN ORGANIZED HEALTH CARE EDUCATION/TRAINING PROGRAM

## 2025-07-10 PROCEDURE — 99232 SBSQ HOSP IP/OBS MODERATE 35: CPT | Performed by: STUDENT IN AN ORGANIZED HEALTH CARE EDUCATION/TRAINING PROGRAM

## 2025-07-10 PROCEDURE — 99207 PR NON-BILLABLE SERV PER CHARTING: CPT | Performed by: STUDENT IN AN ORGANIZED HEALTH CARE EDUCATION/TRAINING PROGRAM

## 2025-07-10 PROCEDURE — 250N000011 HC RX IP 250 OP 636: Performed by: STUDENT IN AN ORGANIZED HEALTH CARE EDUCATION/TRAINING PROGRAM

## 2025-07-10 PROCEDURE — 71250 CT THORAX DX C-: CPT

## 2025-07-10 PROCEDURE — 36415 COLL VENOUS BLD VENIPUNCTURE: CPT | Performed by: STUDENT IN AN ORGANIZED HEALTH CARE EDUCATION/TRAINING PROGRAM

## 2025-07-10 PROCEDURE — 250N000013 HC RX MED GY IP 250 OP 250 PS 637: Performed by: HOSPITALIST

## 2025-07-10 PROCEDURE — 97530 THERAPEUTIC ACTIVITIES: CPT | Mod: GP

## 2025-07-10 PROCEDURE — 80048 BASIC METABOLIC PNL TOTAL CA: CPT | Performed by: STUDENT IN AN ORGANIZED HEALTH CARE EDUCATION/TRAINING PROGRAM

## 2025-07-10 PROCEDURE — 120N000002 HC R&B MED SURG/OB UMMC

## 2025-07-10 PROCEDURE — 999N000111 HC STATISTIC OT IP EVAL DEFER: Performed by: OCCUPATIONAL THERAPIST

## 2025-07-10 PROCEDURE — 71250 CT THORAX DX C-: CPT | Mod: 26 | Performed by: RADIOLOGY

## 2025-07-10 RX ORDER — CEFTRIAXONE 2 G/1
2 INJECTION, POWDER, FOR SOLUTION INTRAMUSCULAR; INTRAVENOUS EVERY 24 HOURS
Status: DISCONTINUED | OUTPATIENT
Start: 2025-07-10 | End: 2025-07-12 | Stop reason: HOSPADM

## 2025-07-10 RX ORDER — METRONIDAZOLE 500 MG/1
500 TABLET ORAL 3 TIMES DAILY
Status: DISCONTINUED | OUTPATIENT
Start: 2025-07-10 | End: 2025-07-12 | Stop reason: HOSPADM

## 2025-07-10 RX ADMIN — Medication 1 CAPSULE: at 10:50

## 2025-07-10 RX ADMIN — METOPROLOL SUCCINATE 25 MG: 25 TABLET, EXTENDED RELEASE ORAL at 10:50

## 2025-07-10 RX ADMIN — METRONIDAZOLE 500 MG: 500 TABLET ORAL at 15:09

## 2025-07-10 RX ADMIN — CEFTRIAXONE 2 G: 2 INJECTION, POWDER, FOR SOLUTION INTRAMUSCULAR; INTRAVENOUS at 15:09

## 2025-07-10 RX ADMIN — METRONIDAZOLE 500 MG: 500 TABLET ORAL at 20:21

## 2025-07-10 RX ADMIN — APIXABAN 2.5 MG: 2.5 TABLET, FILM COATED ORAL at 20:21

## 2025-07-10 RX ADMIN — APIXABAN 2.5 MG: 2.5 TABLET, FILM COATED ORAL at 10:50

## 2025-07-10 RX ADMIN — CALCIUM CARBONATE 600 MG (1,500 MG)-VITAMIN D3 400 UNIT TABLET 2 TABLET: at 10:50

## 2025-07-10 ASSESSMENT — ACTIVITIES OF DAILY LIVING (ADL)
ADLS_ACUITY_SCORE: 61

## 2025-07-10 NOTE — PROGRESS NOTES
"Brief Medicine Cross-Cover Note:    Was notified by RN regarding urine culture growing ESBL Klebsiella and Aerococcus urinae. Patient received Cefepime and Vancomycin at admission yesterday and was then switched to Rocephin today. CRP and WBC have not improved much and she continues to feel poorly.    Noted penicillin allergy and she has never tried a carbapenem previously. Has done fine with many cephalosporins. Son is present and reports she received a \"penicillin cousin\" after a cat bite and did fine with it; we believe this was likely Unasyn. Pharmacy recommended Ertapenem based on culture results and cross-reactivity profile.    After risks benefits discussion with patient and son, we are in agreement to switch antibiotics to Ertapenem with plan for close monitoring. ID consult placed to aid in recommendations for antibiotic treatment length of time.    As far as her hypoxia goes, suspect hypervolemia as cause in setting of IVF bolus given in ED with moderate-severe tricuspid regurg and pulmonary hypertension seen on TTE. Her symptoms improve with sitting up in bed and moving around, worse with laying flat.    Danish Lanier PA-C on 7/9/2025 at 10:32 PM          "

## 2025-07-10 NOTE — PROGRESS NOTES
[facility]    Medicine Progress Note - Hospitalist Service, GOLD TEAM 22    Date of Admission:  7/8/2025    Assessment & Plan   Hailey Alexis is a 89 year old female admitted on 7/8/2025. She has a history of CVA, atrial fibrillation on Eliquis, osteoarthritis of right knee, osteoporosis and is admitted for hypoxia and concern for sepsis of uncertain etiology.     Plan today:   - Stopped Ceftriaxone, started Ertapenem   - Follow up non-contrast CT Chest given ongoing oxygen requirement -> likely aspiration pneumonia   - ID consulted, stopped ertapenem, felt ESBL unlikely to represent true infection. Start Ceftriaxone/flagyl   - Mobilization, spirometry, wean O2 as able    - RUQ US given ?perihepatic fluid attenuation seen on CT.    Concern for sepsis, likely secondary to aspiration pneumonia, improved  Leukocytosis   Presents with 4 days of chills, fatigue, productive cough and dyspnea. Apparently had temp of 100.7 at home. CXR without focal airspace consolidation. Noted UA with large LE and large WBC and RBCs, culture pending. Differential diagnosis includes likely UTI, though this would not account for her dyspnea. Consideration for viral myocarditis, though troponin WNL. RVP negative. Initial plan to treat UTI; growing ESBL klebsiella and aerococcus, however, ID consulted and felt more likely to aspiration pneumonia after CT chest with evidence of right sided opacity. Will plan to treat for aspiration pneumonia.   - MRSA nasal swab negative - Vancomycin discontinued   - RVP and COVID/flu negative   - Follow up blood culture 7/8 - NGTD   - Follow up urine culture -> ESBL Klebsiella and Aerococcus (felt to represent asymptomatic bacteruria)  - Narrow to ceftriaxone; stop cefepime 7/9, stop Ceftriaxone 7/9  - Started Ertapenem on 7/9; stopped 7/10  - Restart ceftriaxone on 7/10; flagyl PO per ID recommendation -> transition to orals on 7/11 if stable  "    Hypoxic respiratory failure, secondary to pneumonia, improving   Expiratory wheezing, improved   Concern for aspiration  Severe dilation of the thoracic esophagus  Troponin WNL, low concern for anginal equivalent without chest pain. Negative respiratory viral panel. No consolidation on CXR, though bacterial pneumonia remains in the differential. Clot remains on the differential though less likely given she is on DOAC, though per son, she refuses this about once per week and may not be therapeutic. D dimer mildly elevated but within normal range when adjusted for age. TTE without evidence of heart failure. Opted for CT chest on 7/10 given ongoing mild hypoxia revealing R sided consolidation, consistent with aspiration.   - PRN xopenex   - TTE on 7/8 with LVEF of 55-60%, moderate TVI, normal RV function  - Negative urine legionella (not on atypical coverage currently)  - Follow up non-contrast CT chest -> \"Multifocal consolidative opacities in the dependent right upper, middle & lower lobes, superimposed on diffuse interlobular septal thickening & bronchovascular consolidations. Likely  represents multifocal aspiration pneumonitis versus pneumonia with reactive pulmonary edema.\"  - Spirometry and encourage mobilization (up to chair as able)   - Antibiotic plan per above.   - Regarding aspiration risk, she is found to have dilated esophagus as likely contributor to ongoing aspiration risk. She is tolerating PO including liquids without clinical evidence of aspiration. Discussed with family at the bedside, we could pursue SLP but I would not recommend this as we will be unlikely to be able to address underlying cause without invasive measures. Given her age, recommendation for treatment of aspiration pneumonia and close clinical monitoring for recurrence with PCP with ongoing GOC discussions. The pleasure provided by oral intake and she has a good appetite; doubt NPO or adjusted intake status would be within her " GOC.     Asymptomatic bacteruria   See discussion above. Urine culture with ESBL Klebsiella and Aerococcus. No urinary symptoms and felt to represent dirty catch, not true infection per ID.   - Stop ertapenam on 7/10    Atrial fibrillation   Hx CVA   EKG apparently completed but not uploaded. Rate controlled. Per son, the patient does intermittently decline DOAC (about once per week) and may not be therapeutic on this. Discussed risk vs benefit of ongoing AC could be discussed with PCP outpatient of concern for pill burden, age, fall risk etc.    - PTA metoprolol succinate 25mg PO daily    - PTA apixaban     OA: Holding PTA fosamax, continue calcium/D          Diet: Combination Diet Regular Diet Adult    DVT Prophylaxis: DOAC  Snyder Catheter: Not present  Lines: None     Cardiac Monitoring: None  Code Status: No CPR- Do NOT Intubate      Clinically Significant Risk Factors           # Hypocalcemia: Lowest Ca = 8.3 mg/dL in last 2 days, will monitor and replace as appropriate     # Hypoalbuminemia: Lowest albumin = 3.4 g/dL at 7/8/2025  1:31 PM, will monitor as appropriate                    # Financial/Environmental Concerns:           Social Drivers of Health    Tobacco Use: Medium Risk (7/8/2025)    Patient History     Smoking Tobacco Use: Former     Smokeless Tobacco Use: Never   Physical Activity: Not on File (4/6/2022)    Received from [employer]    Physical Activity     Physical Activity: 0   Stress: Not on File (4/6/2022)    Received from [employer]    Stress     Stress: 0   Social Connections: Not on File (9/13/2024)    Received from [employer]    Social Connections     Connectedness: 0          Disposition Plan     Medically Ready for Discharge: Anticipated in 2-4 Days             Inés Silveira DO  Hospitalist Service, GOLD TEAM [address] [place] [address]  Securely message with [employer] (more info)  Text page via [facility] Paging/Directory   See signed in provider for up to date coverage  information  ______________________________________________________________________    Interval History   Found to have ESBL on urine culture, switched to Ertapenem. She feels well this morning. Denies cough or dyspnea. No abdominal pain or other concerns at this time.     Physical Exam   Vital Signs: Temp: 97.9  F (36.6  C) Temp src: Oral BP: 134/83 Pulse: 86   Resp: 16 SpO2: 93 % O2 Device: Nasal cannula Oxygen Delivery: 1 LPM  Weight: 178 lbs 5.63 oz    General: elderly female, hard of hearing, no acute distress. Family at the bedside.   CV: RRR, no m/r/g. Extremities are warm and well perfused.   LUNGS: CTAB, no w/r/c.  ABD: Soft, NT/ND, NBS, no suprapubic tenderness.   SKIN: Warm, well perfused. No skin rashes or abnormal lesions.  MSK: No deformities. No clubbing, cyanosis, or edema.  NEURO: hard of hearing, answering most questions appropriately, No focal deficits.    Medical Decision Making           Data     I have personally reviewed the following data over the past 24 hrs:    19.0 (H)  \   13.0   / 278     137 103 19.1 /  91   4.0 24 0.67 \     Procal: N/A CRP: 174.28 (H) Lactic Acid: N/A         Imaging results reviewed over the past 24 hrs:   Recent Results (from the past 24 hours)   Echo Limited *Canceled*    Narrative    The following orders were created for panel order Echo Limited.  Procedure                               Abnormality         Status                     ---------                               -----------         ------                       Please view results for these tests on the individual orders.   Echo Complete   Result Value    LVEF  55-60%    Narrative    [id number]  [id number]  [id number]  [id number]^ROD DINH^FERNIE^     [facility],[place]  Echocardiography Laboratory  [address] [place] [id number]     Name: MANJEET MILLER  MRN: [id number]  : 02/10/1930  Study Date: 2025 10:59 AM  Age: 89 yrs  Gender: Female  Patient Location:  [facility]  Reason For Study: Atrial Fibrillation  Ordering Physician: FERNIE MOSLEY  Performed By: Olvin Luther RDCS     BSA: 1.8 m2  Height: 63 in  Weight: 178 lb  HR: 89  BP: 117/63 mmHg  ______________________________________________________________________________  Procedure  Echocardiogram with two-dimensional, color and spectral Doppler. Contrast  Optison. Technically difficult study. Optison (NDC #5223-5946-67) given  intravenously. Patient was given 6 ml mixture of 3 ml Optison and 6 ml saline.  3 ml wasted.  ______________________________________________________________________________  Interpretation Summary  Global and regional left ventricular function is normal with an EF of 55-60%.  Right ventricular function, chamber size, wall motion, and thickness are  normal.  Moderate to severe tricuspid insufficiency is present.  Moderate (pulmonary artery systolic pressure 50-75mmHg) pulmonary hypertension  is present.  Sinuses of Valsalva 4 cm.  Aortic root dilatation is present.  IVC diameter <2.1 cm collapsing >50% with sniff suggests a normal RA pressure  of 3 mmHg.  No pericardial effusion is present.  There is no prior study for direct comparison.  ______________________________________________________________________________  Left Ventricle  Global and regional left ventricular function is normal with an EF of 55-60%.  Left ventricular wall thickness is normal. Left ventricular size is normal.  Diastolic function not assessed due to atrial fibrillation. No regional wall  motion abnormalities are seen.     Right Ventricle  Right ventricular function, chamber size, wall motion, and thickness are  normal.     Atria  The left atrium appears normal. Moderate to severe right atrial enlargement is  present. The atrial septum is intact as assessed by color Doppler .     Mitral Valve  The mitral valve is normal. Trace mitral insufficiency is present.     Aortic Valve  Aortic valve is normal in structure and  function.     Tricuspid Valve  Moderate to severe tricuspid insufficiency is present. The right ventricular  systolic pressure is 56mmHg above the right atrial pressure. Moderate  (pulmonary artery systolic pressure 50-75mmHg) pulmonary hypertension is  present.     Pulmonic Valve  The pulmonic valve is normal. Trace pulmonic insufficiency is present.     Vessels  The pulmonary artery cannot be assessed. Sinuses of Valsalva 4 cm. Aortic root  dilatation is present. IVC diameter <2.1 cm collapsing >50% with sniff  suggests a normal RA pressure of 3 mmHg.     Pericardium  No pericardial effusion is present.     Compared to Previous Study  There is no prior study for direct comparison.  ______________________________________________________________________________  MMode/2D Measurements & Calculations     IVSd: 0.79 cm  LVIDd: 3.9 cm  LVIDs: 2.6 cm  LVPWd: 1.1 cm  FS: 32.9 %  LV mass(C)d: 112.3 grams  LV mass(C)dI: 61.0 grams/m2  Ao root diam: 4.0 cm  asc Aorta Diam: 3.6 cm  LVOT diam: 2.0 cm  LVOT area: 3.1 cm2  Ao root diam index Ht(cm/m): 2.5  Ao root diam index BSA (cm/m2): 2.2  Asc Ao diam index BSA (cm/m2): 2.0  Asc Ao diam index Ht(cm/m): 2.2  LA Volume (BP): 51.5 ml     LA Volume Index (BP): 28.0 ml/m2  RWT: 0.55  TAPSE: 1.4 cm     Doppler Measurements & Calculations  PA acc time: 0.07 sec  TR max adelso: 373.0 cm/sec  TR max P.7 mmHg  RV S Adelso: 9.0 cm/sec     ______________________________________________________________________________  Report approved by: SHELLIE Owusu MD on 2025 01:58 PM

## 2025-07-10 NOTE — PLAN OF CARE
Goal Outcome Evaluation:      Plan of Care Reviewed With: patient    Overall Patient Progress: improving    Outcome Evaluation: Pt participated and tolerated theraphy on 1/2L oxygen. Coninues tolerating IV antibiotics as ordered.       O2: Stating over 90% on RA   Output: Incontinent, uses pure wick    Last BM: 7/10/25   Activity: Assist x1 with W/GB for transfers   Skin: X right arm    Pain: Denies   CMS: A/O with intermittent confusion, [place], uses hearing aids   Dressing: none   Diet: Regular   LDA: R PIV saline locked   Equipment: IV pole, recliner   Plan: IV antibiotic therapy, continue with POC   Additional Info: Pt is calm and cooperative with cares. Denies CP, SOB, N/V. Son at bedside most of the shift involved in patients' care.

## 2025-07-10 NOTE — PLAN OF CARE
Goal Outcome Evaluation:      Plan of Care Reviewed With: patient    Overall Patient Progress: improvingOverall Patient Progress: improving    VS: /86   Pulse 78   Temp 97.9  F (36.6  C) (Oral)   Resp 16   Wt 80.9 kg (178 lb 5.6 oz)   SpO2 95%   BMI 31.09 kg/m     O2: SpO2 > 95 and stable on oxygen at 1L /NC. LS clear and diminished bilaterally. Denies chest pain and SOB.    Output: Voids spontaneously without difficulty incontinent, has pure wick in place overnight.    Last BM: UTD, denies abdominal discomfort. BS active / passing flatus. Offered bowel medication and said she will take them during the day.    Activity: Up with Ax2 for bed mobility and transfer to a walker.    Skin: Skin not assess.    Pain: Denied pain.    CMS: Aox2, disoriented to time/ situation numbness and tingling.   Dressing:    Diet: Regular diet. Denies nausea/vomiting. Pt had pizza for dinner, brought in by family.    LDA: R-PIV   Equipment: IV pole, personal belongings,    Plan: Fall/ contact precautions maintained / Continue with plan of care. Call light within reach, pt able to make needs known. Alarm on. Safety checks completed.    Additional Info: Pt tested positive for ESBL in Urine, Physician informed started on Ertapenem and rocephin stopped.    PCD on overnight.  Family slept overnight.   Pt tolerated saturations at 1L /NC of oxygen.

## 2025-07-10 NOTE — PLAN OF CARE
OT: defer. Per chart review and discussion with interdisciplinary team pt. has A wtih BADls and functional mobility at baseline, no acute IP OT needs identified at this time. PT following for any IP gait/str. needs.Defer to PT for d/c rec.s.

## 2025-07-10 NOTE — CONSULTS
[facility] GENERAL INFECTIOUS DISEASES CONSULTATION     Patient:  Hailey Alexis   Date of birth 2/10/1930, Medical record number [id number]  Date of Visit:  07/10/2025  Date of Admission: 7/8/2025  Consult Requester:Inés Silveira DO          Assessment and Recommendations:   ID Problem List  Multifocal aspiration pneumonia vs CAP  Asymptomatic bacteruria (+ESBL Klebsiella and Aerococcus) in setting of dirty catch UA  Remote penicillin allergy  Rash not requiring hospitalization >20 years ago. Unclear how immediate the rash was. She doesn't remember many of the specifics but is quite sure she had no face/tongue swelling or difficulty breathing nor admission. Tolerated cefpodoxime challenge 4/14/24 and multiple cephalosporins + ertapenem in 2025.  History of infected cat bite c/b septic tenosynovitis of 4th extensor, small abscess, no surgical intervention. Completed 3 weeks ceftriaxone --> cefpodoxime + flagyl in 04/2024    RECOMMENDATION:  Start IV ceftriaxone 2 g daily and PO metronidazole 500 mg q8 hrs for aspiration pneumonia  STOP ertapenem - no treatment necessary for asymptomatic bacteruria. Aerococcus on urine will be incidentally covered by above regimen  Agree with radiology recommendation for hepatic US given abnormal perihepatic area on CT chest  Recommend aspiration precautions in setting of severe thoracic esophageal dilation noted on CT.   Further workup of this dilation per primary team/PCP.   Encouraged incentive spirometer use.   Sit upright after meals/snacks for 1-2 hrs.     ASSESSMENT:  Hailey Alexis is a very pleasant 89 year old female with PMHx significant for CVA and Afib on Eliquis who was admitted 7/8 after four days of fatigue, lethargy, chills, wet nonproductive cough, dyspnea and Tmax 100.7F at home, found with aspiration pneumonia. Met sepsis criteria on admission - though afebrile here, had significant leukocytosis to 25, , tachycardia, and new  hypoxia requiring 1-2L NC.     Viral and urine antigen workup negative. CXR with low lung volumes, no focal consolidation, though CT chest 7/10 notable for multifocal consolidative opacities in dependent lobes on R side with prominent mediastinal LAD and diffuse interlobular septal thickening + bronchovascular consolidations c/f multifocal aspiration pneumonitis versus pneumonia with reactive pulmonary edema. Additionally, CT noted severe dilation of thoracic esophagus likely the etiology of her aspiration if underlying achalasia/hiatal hernia/other etiology for this dilation. CT also noted a possible hypoattenuating area perihepatically which may be colon vs fluid collection - agree with radiology recommendation for hepatic US to further characterize, though no RUQ complaints. She has since been weaned to 1/2 L NC and feels better, not yet back to baseline. Also found with positive urine culture -  growth of 50-100K CFU ESBL Klebsiella pneumonia and Aerococcus. UA abnormal with 4 squamous cells is dirty catch, >182 WBC and RBCs, negative nitrite. She has no urinary symptoms and this would be asymptomatic bacteruria for which no treatment is indicated.     She has been on multiple antibiotics this admission - initially on vancomycin + cefepime --> ceftriaxone --> ertapenem based on urine culture. As urine culture does not require treatment, do not need to cover for ESBL. Would resume ceftriaxone + add flagyl for anaerobic coverage of aspiration pneumonia (though not always required). Continue for ~5 day course for aspiration pneumonia.     Thank you for this consult. ID will continue to follow.   Recommendations discussed with primary team.    Floresita Gomez PA-C  Infectious Diseases  Contact via [employer]ul or [email or web address] Paging/Directory    85 MINUTES SPENT BY ME on the date of service doing chart review, history, exam, documentation & further activities per the note.           History of Present Illness:     Hailey  Danish Alexis is a 89 year old female with past medical history significant for CVA, Afib on Eliquis, osteoporosis, and OA of R knee who was admitted 7/8 with sepsis and 4 days chills, fatigue, cough, and dyspnea with Tmax 100.7F at home and found with hypoxia requiring 1-2L NC. ID consulted for antibiotic management 7/10 with Urine culture +ESBL Klebsiella pneumoniae and Aerococcus. Patient hard of hearing, but alert and engaged. Son at bedside helps provide additional history and insight.     He noticed her more fatigued and sleeping significantly more since ~7/4. She had lower appetite and typically eats very well. Son noticed increased cough - wet sounding but not productive. She does cough - especially while laying flat and sometimes will awaken herself from sleep. She denies sore throat, sinus congestion, or recent sick contacts. Last travel was a few months ago to [place] for a Blues festival and to [place] with family, no more recent travel. Does have upcoming wedding of her son in [place] on 7/19. Denies dysphagia, odynophagia, reflux, or globus sensation. No nausea, vomiting, abdominal pain, flank pain, dysuria, urinary frequency, or urgency, or diarrhea. She wears Depends at baseline but no change in bowel/bladder habits per son.     Seen by PCP and then sent to ED on 7/8. In ED, afebrile, tachycardic 90-110s, normotensive, on 2L NC. Labs with WBC 25 with left shift, , procalcitonin 0.33, normal lactic, unremarkable BMP, hepatic panel hemolyzed. Elevated BNP 3,185, troponin stable, and D-dimer 0.89. Was started on vancomycin + cefepime --> ceftriaxone --> ertapenem. UA abnormal with 4 squamous cells, >182 WBC and RBCs, negative nitrite. Ucx with ESBL Klebsiella pneumonia and Aerococcus. Flu/COVID/RSV and RVP panel and urine Strep/Legionella Ags negative. MRSA nares negative. Blood cultures NGTD. CXR with low lung volumes, no focal consolidation. Remains on 1L NC. CT chest 7/10 with multifocal  consolidative opacities in dependent lobes on R side with prominent mediastinal LAD and diffuse interlobular septal thickening + bronchovascular consolidations c/f multifocal aspiration pneumonitis versus pneumonia with reactive pulmonary edema. Additionally, CT noted sever dilation of thoracic esophagus as well as a possible hypoattenuating area perihepatically which may be colon vs collection. Recommend hepatic US. She has since been weaned to 1/2 L NC and feels better, not yet back to baseline. Son feels her energy and appetite are improving today.     Antimicrobials:  Ertapenem 7/9  Ceftriaxone 7/9  Cefepime 7/8 - 7/9  Vancomycin 7/8         Review of Systems:   CONSTITUTIONAL:  No fevers, chills or night sweats here.  EYES: negative for icterus, redness, or purulent drainage.   ENT:  negative for nasal congestion, rhinorrhea, or sore throat.  RESPIRATORY: per HPI  CARDIOVASCULAR:  negative for chest pain or palpitations.  GASTROINTESTINAL:  negative for nausea, vomiting, diarrhea and constipation.  GENITOURINARY:  negative for dysuria, frequency, or urgency.   HEME:  No easy bruising or bleeding.  INTEGUMENT:  negative for rash and pruritus.  NEURO:  Negative for headache, vision changes, or numbness/tingling in extremities.         Past Medical History:     Past Medical History:   Diagnosis Date    Cerebrovascular accident (H)     Chronic atrial fibrillation (H)     Hard of hearing             Past Surgical History:   History reviewed. No pertinent surgical history.         Family History:     No family history on file.         Social History:     Social History     Tobacco Use    Smoking status: Former     Types: Cigarettes    Smokeless tobacco: Never   Substance Use Topics    Alcohol use: Yes     Alcohol/week: 1.0 standard drink of alcohol     Types: 1 Standard drinks or equivalent per week     History   Sexual Activity    Sexual activity: Not on file            Current Medications:     Current  Facility-Administered Medications   Medication Dose Route Frequency Provider Last Rate Last Admin    apixaban ANTICOAGULANT (ELIQUIS) tablet 2.5 mg  2.5 mg Oral BID Pancho Jones MD   2.5 mg at 07/09/25 2039    calcium carbonate-vitamin D (CALTRATE) 600-10 MG-MCG per tablet 2 tablet  2 tablet Oral Daily LoInés moses, DO   2 tablet at 07/09/25 0931    ertapenem (INVanz) 1 g vial to attach to  mL bag  1 g Intravenous Q24H Danish Lanier PA-C   1 g at 07/09/25 2311    metoprolol succinate ER (TOPROL XL) 24 hr tablet 25 mg  25 mg Oral Daily Pancho Jones MD   25 mg at 07/09/25 0931    multivitamin  with lutein (OCUVITE WITH LUTEIN) per capsule 1 capsule  1 capsule Oral Daily Inés Silveira, DO   1 capsule at 07/09/25 0931    sodium chloride (PF) 0.9% PF flush 3 mL  3 mL Intracatheter Q8H Pancho Jones MD   3 mL at 07/10/25 0642            Allergies:     Allergies   Allergen Reactions    Penicillin G Rash     Patient reports rash not requiring hospitalization in ~2000. Doesn't remember specifics but no tongue/throat swelling and no difficulty breathing. Reviewed with her 4/14/24.  Tolerated cefpodoxime 4/14/24, ceftriaxone 4/15            Physical Exam:   Vitals were reviewed  Patient Vitals for the past 24 hrs:   BP Temp Temp src Pulse Resp SpO2   07/10/25 0845 (!) 140/86 98.3  F (36.8  C) Oral 84 16 92 %   07/10/25 0444 134/83 -- -- 86 16 93 %   07/09/25 2300 133/86 97.9  F (36.6  C) Oral 78 16 95 %   07/09/25 1940 114/83 97.7  F (36.5  C) Oral 72 17 --   07/09/25 1900 -- -- -- -- -- 94 %   07/09/25 1700 -- -- -- -- -- 96 %   07/09/25 1636 -- -- -- -- -- 92 %   07/09/25 1634 -- -- -- -- -- 93 %   07/09/25 1606 125/86 98.2  F (36.8  C) Oral 108 17 (!) 90 %   07/09/25 1300 -- -- -- -- -- 93 %   07/09/25 1253 -- -- -- -- -- 93 %   07/09/25 1205 116/79 98.4  F (36.9  C) Oral 90 18 93 %   07/09/25 0938 -- -- -- -- -- 94 %   07/09/25 0931 (!) 105/93 -- -- 94 -- --       Physical  Examination:  Constitutional: Pleasant elderly adult female seen sitting up in bed, in NAD. Alert and interactive. Hard of hearing.  HEENT: NCAT, anicteric sclerae, conjunctiva clear. Moist mucous membranes without lesions or thrush. Dentition intact/well cared for.  Respiratory: Non-labored breathing on 1/2 L NC, good air exchange. Lungs are clear to auscultation apically, fine crackles noted basilarly. Nonproductive cough noted.   Cardiovascular: Regular rate and rhythm with no murmur, rub or gallop.  GI: Normoactive BS. Abdomen is soft, nontender to palpation, non-distended. No rigidity or guarding. No rebound tenderness. No suprapubic tenderness.   Skin: Warm and dry. No rashes or lesions on exposed surfaces.  Musculoskeletal: Extremities grossly normal. No tenderness or edema present.   Neurologic: A &O x3, speech normal, answering questions appropriately. Moves all extremities spontaneously. Grossly non-focal.  Neuropsychiatric: Mentation and affect normal/appropriate.  VAD: PIV is c/d/i with no erythema, drainage, or tenderness.         Laboratory Data:     Inflammatory Markers  No lab results found.    Hematology Studies    Recent Labs   Lab Test 07/09/25 0813 07/08/25  1331 05/19/25  1555 04/18/24  0950 04/17/24  0723 04/16/24  0656 04/15/24  1615 04/13/24  0946 04/12/24  0710 04/11/24  1551   WBC 19.0* 25.0* 10.3 8.3 8.2 9.3   < > 11.3*   < > 16.4*   ANEU  --  20.6* 6.5  --   --   --   --  8.1  --  12.7*   AEOS  --  0.1 0.3  --   --   --   --  0.4  --  0.2   HGB 13.0 15.7 15.6 14.7 13.7 13.9   < > 13.5   < > 15.3   MCV 92 92 96 91 92 93   < > 94   < > 93    312 264 347 300 279   < > 231   < > 262    < > = values in this interval not displayed.       Metabolic Studies     Recent Labs   Lab Test 07/09/25  0813 07/08/25  1331 05/19/25  1555 04/16/24  0656 04/13/24  0946    135 139 138 138   POTASSIUM 4.0 5.0 4.8 4.9 4.6   CHLORIDE 103 99 101 105 106   CO2 24 24 27 25 25   BUN 19.1 24.5* 24.1*  20.4 24.2*   CR 0.67 0.76 0.76 0.82 0.77   GFRESTIMATED 80 72 72 66 71       Hepatic Studies    Recent Labs   Lab Test 07/08/25  1331 04/11/24  1551   BILITOTAL 1.4* 0.8   ALKPHOS 221* 141   ALBUMIN 3.4* 4.0   AST  --  30   ALT  --  17       Microbiology:  Culture   Date Value Ref Range Status   07/08/2025 No growth after 1 day  Preliminary   07/08/2025 No growth after 1 day  Preliminary   07/08/2025 50,000-100,000 CFU/mL Klebsiella pneumoniae ESBL (A)  Final   07/08/2025 50,000-100,000 CFU/mL Aerococcus urinae (A)  Final     Comment:     Aerococcus species is usually susceptible to penicillin, cephalosporins, tetracycline and vancomycin.       Urine Studies    Recent Labs   Lab Test 07/08/25  1540   LEUKEST Large*   WBCU >182*     IMAGING  CT Chest w/o Contrast  Result Date: 7/10/2025  IMPRESSION: 1.  Multifocal consolidative opacities in the dependent juxtapleural right upper, middle & lower lobes with prominent mediastinal lymphadenopathy is superimposed on diffuse interlobular septal thickening & bronchovascular consolidations. Likely represents multifocal aspiration pneumonitis versus pneumonia with reactive pulmonary edema. 2.  Indeterminant anterior perihepatic hypoattenuating area, may represent interposition of the colon versus perihepatic collection. Recommend dedicated hepatic ultrasound for further characterization. The indeterminate peripherally calcified focus within the left lobe of the liver can be evaluated at that time as well. 3.  Severe dilation of the thoracic esophagus. When patient condition allows nonemergent evaluation with a outpatient double contrast barium esophagram may be obtained for further clarification. 4.  Cardiomegaly with biatrial enlargement & severe atherosclerotic calcifications. 5.  Large calcified intraluminal plaque in the proximal abdominal aorta. No evidence of abdominal aortic aneurysm. 6.  Bronchiectasis and bronchial wall thickening.     Echo Complete  Result Date:  7/9/2025   Interpretation Summary Global and regional left ventricular function is normal with an EF of 55-60%. Right ventricular function, chamber size, wall motion, and thickness are normal. Moderate to severe tricuspid insufficiency is present. Moderate (pulmonary artery systolic pressure 50-75mmHg) pulmonary hypertension is present. Sinuses of Valsalva 4 cm. Aortic root dilatation is present. IVC diameter <2.1 cm collapsing >50% with sniff suggests a normal RA pressure of 3 mmHg. No pericardial effusion is present. There is no prior study for direct comparison.     XR Chest 2 Views  Result Date: 7/8/2025  Impression: Low lung volumes with atelectasis. No focal airspace consolidation.

## 2025-07-11 ENCOUNTER — APPOINTMENT (OUTPATIENT)
Dept: ULTRASOUND IMAGING | Facility: CLINIC | Age: OVER 89
End: 2025-07-11
Attending: STUDENT IN AN ORGANIZED HEALTH CARE EDUCATION/TRAINING PROGRAM
Payer: COMMERCIAL

## 2025-07-11 LAB
ALBUMIN SERPL BCG-MCNC: 2.7 G/DL (ref 3.5–5.2)
ALP SERPL-CCNC: 190 U/L (ref 40–150)
ALT SERPL W P-5'-P-CCNC: 33 U/L (ref 0–50)
ANION GAP SERPL CALCULATED.3IONS-SCNC: 9 MMOL/L (ref 7–15)
AST SERPL W P-5'-P-CCNC: 44 U/L (ref 0–45)
BASOPHILS # BLD AUTO: 0.1 10E3/UL (ref 0–0.2)
BASOPHILS NFR BLD AUTO: 1 %
BILIRUB DIRECT SERPL-MCNC: 0.12 MG/DL (ref 0–0.3)
BILIRUB SERPL-MCNC: 0.4 MG/DL
BUN SERPL-MCNC: 13.7 MG/DL (ref 8–23)
CALCIUM SERPL-MCNC: 8.6 MG/DL (ref 8.8–10.4)
CHLORIDE SERPL-SCNC: 100 MMOL/L (ref 98–107)
CREAT SERPL-MCNC: 0.61 MG/DL (ref 0.51–0.95)
EGFRCR SERPLBLD CKD-EPI 2021: 82 ML/MIN/1.73M2
EOSINOPHIL # BLD AUTO: 0.4 10E3/UL (ref 0–0.7)
EOSINOPHIL NFR BLD AUTO: 3 %
ERYTHROCYTE [DISTWIDTH] IN BLOOD BY AUTOMATED COUNT: 14.2 % (ref 10–15)
GLUCOSE SERPL-MCNC: 96 MG/DL (ref 70–99)
HCO3 SERPL-SCNC: 26 MMOL/L (ref 22–29)
HCT VFR BLD AUTO: 39.4 % (ref 35–47)
HGB BLD-MCNC: 13.1 G/DL (ref 11.7–15.7)
IMM GRANULOCYTES # BLD: 0.1 10E3/UL
IMM GRANULOCYTES NFR BLD: 1 %
LYMPHOCYTES # BLD AUTO: 1.7 10E3/UL (ref 0.8–5.3)
LYMPHOCYTES NFR BLD AUTO: 12 %
MCH RBC QN AUTO: 30.4 PG (ref 26.5–33)
MCHC RBC AUTO-ENTMCNC: 33.2 G/DL (ref 31.5–36.5)
MCV RBC AUTO: 91 FL (ref 78–100)
MONOCYTES # BLD AUTO: 1.6 10E3/UL (ref 0–1.3)
MONOCYTES NFR BLD AUTO: 12 %
NEUTROPHILS # BLD AUTO: 9.5 10E3/UL (ref 1.6–8.3)
NEUTROPHILS NFR BLD AUTO: 71 %
NRBC # BLD AUTO: 0 10E3/UL
NRBC BLD AUTO-RTO: 0 /100
PLAT MORPH BLD: NORMAL
PLATELET # BLD AUTO: 351 10E3/UL (ref 150–450)
POTASSIUM SERPL-SCNC: 4.4 MMOL/L (ref 3.4–5.3)
PROT SERPL-MCNC: 6.3 G/DL (ref 6.4–8.3)
RBC # BLD AUTO: 4.31 10E6/UL (ref 3.8–5.2)
RBC MORPH BLD: NORMAL
SODIUM SERPL-SCNC: 135 MMOL/L (ref 135–145)
WBC # BLD AUTO: 13.4 10E3/UL (ref 4–11)

## 2025-07-11 PROCEDURE — 99232 SBSQ HOSP IP/OBS MODERATE 35: CPT | Performed by: STUDENT IN AN ORGANIZED HEALTH CARE EDUCATION/TRAINING PROGRAM

## 2025-07-11 PROCEDURE — 250N000011 HC RX IP 250 OP 636: Performed by: STUDENT IN AN ORGANIZED HEALTH CARE EDUCATION/TRAINING PROGRAM

## 2025-07-11 PROCEDURE — 85025 COMPLETE CBC W/AUTO DIFF WBC: CPT | Performed by: STUDENT IN AN ORGANIZED HEALTH CARE EDUCATION/TRAINING PROGRAM

## 2025-07-11 PROCEDURE — 250N000013 HC RX MED GY IP 250 OP 250 PS 637: Performed by: STUDENT IN AN ORGANIZED HEALTH CARE EDUCATION/TRAINING PROGRAM

## 2025-07-11 PROCEDURE — 80048 BASIC METABOLIC PNL TOTAL CA: CPT | Performed by: STUDENT IN AN ORGANIZED HEALTH CARE EDUCATION/TRAINING PROGRAM

## 2025-07-11 PROCEDURE — 84155 ASSAY OF PROTEIN SERUM: CPT | Performed by: STUDENT IN AN ORGANIZED HEALTH CARE EDUCATION/TRAINING PROGRAM

## 2025-07-11 PROCEDURE — 250N000013 HC RX MED GY IP 250 OP 250 PS 637: Performed by: HOSPITALIST

## 2025-07-11 PROCEDURE — 36415 COLL VENOUS BLD VENIPUNCTURE: CPT | Performed by: STUDENT IN AN ORGANIZED HEALTH CARE EDUCATION/TRAINING PROGRAM

## 2025-07-11 PROCEDURE — 76705 ECHO EXAM OF ABDOMEN: CPT

## 2025-07-11 PROCEDURE — 76705 ECHO EXAM OF ABDOMEN: CPT | Mod: 26 | Performed by: STUDENT IN AN ORGANIZED HEALTH CARE EDUCATION/TRAINING PROGRAM

## 2025-07-11 PROCEDURE — 120N000002 HC R&B MED SURG/OB UMMC

## 2025-07-11 RX ORDER — FUROSEMIDE 10 MG/ML
20 INJECTION INTRAMUSCULAR; INTRAVENOUS ONCE
Status: CANCELLED | OUTPATIENT
Start: 2025-07-11 | End: 2025-07-11

## 2025-07-11 RX ORDER — FUROSEMIDE 10 MG/ML
10 INJECTION INTRAMUSCULAR; INTRAVENOUS ONCE
Status: COMPLETED | OUTPATIENT
Start: 2025-07-11 | End: 2025-07-11

## 2025-07-11 RX ORDER — LEVALBUTEROL TARTRATE 45 UG/1
1-2 AEROSOL, METERED ORAL EVERY 6 HOURS PRN
Qty: 15 G | Refills: 0 | Status: SHIPPED | OUTPATIENT
Start: 2025-07-11

## 2025-07-11 RX ORDER — CEFDINIR 300 MG/1
300 CAPSULE ORAL 2 TIMES DAILY
Qty: 8 CAPSULE | Refills: 0 | Status: SHIPPED | OUTPATIENT
Start: 2025-07-11 | End: 2025-07-15

## 2025-07-11 RX ORDER — METRONIDAZOLE 500 MG/1
500 TABLET ORAL 3 TIMES DAILY
Qty: 12 TABLET | Refills: 0 | Status: SHIPPED | OUTPATIENT
Start: 2025-07-11 | End: 2025-07-15

## 2025-07-11 RX ADMIN — METRONIDAZOLE 500 MG: 500 TABLET ORAL at 19:57

## 2025-07-11 RX ADMIN — CEFTRIAXONE 2 G: 2 INJECTION, POWDER, FOR SOLUTION INTRAMUSCULAR; INTRAVENOUS at 13:30

## 2025-07-11 RX ADMIN — METRONIDAZOLE 500 MG: 500 TABLET ORAL at 08:32

## 2025-07-11 RX ADMIN — CALCIUM CARBONATE 600 MG (1,500 MG)-VITAMIN D3 400 UNIT TABLET 2 TABLET: at 08:31

## 2025-07-11 RX ADMIN — METOPROLOL SUCCINATE 25 MG: 25 TABLET, EXTENDED RELEASE ORAL at 08:32

## 2025-07-11 RX ADMIN — APIXABAN 2.5 MG: 2.5 TABLET, FILM COATED ORAL at 19:57

## 2025-07-11 RX ADMIN — FUROSEMIDE 10 MG: 10 INJECTION, SOLUTION INTRAMUSCULAR; INTRAVENOUS at 17:08

## 2025-07-11 RX ADMIN — APIXABAN 2.5 MG: 2.5 TABLET, FILM COATED ORAL at 08:32

## 2025-07-11 RX ADMIN — Medication 1 CAPSULE: at 08:32

## 2025-07-11 RX ADMIN — METRONIDAZOLE 500 MG: 500 TABLET ORAL at 13:29

## 2025-07-11 ASSESSMENT — ACTIVITIES OF DAILY LIVING (ADL)
ADLS_ACUITY_SCORE: 60
ADLS_ACUITY_SCORE: 61
ADLS_ACUITY_SCORE: 60
ADLS_ACUITY_SCORE: 61
ADLS_ACUITY_SCORE: 61
ADLS_ACUITY_SCORE: 60
ADLS_ACUITY_SCORE: 61
ADLS_ACUITY_SCORE: 61
ADLS_ACUITY_SCORE: 60
ADLS_ACUITY_SCORE: 61
ADLS_ACUITY_SCORE: 60
ADLS_ACUITY_SCORE: 60
ADLS_ACUITY_SCORE: 61
ADLS_ACUITY_SCORE: 60

## 2025-07-11 NOTE — PROGRESS NOTES
[facility]    Medicine Progress Note - Hospitalist Service, GOLD TEAM 22    Date of Admission:  7/8/2025    Assessment & Plan   Hailey Alexis is a 89 year old female admitted on 7/8/2025. She has a history of CVA, atrial fibrillation on Eliquis, osteoarthritis of right knee, osteoporosis and is admitted for hypoxia and concern for sepsis, found to have aspiration pneumonia. Noted urine culture with ESBL Klebsiella, felt to not represent true infection per ID. She will need oxygen on discharge, provided IV diuretic today to see if we are able to wean O2 requirement further.     Plan today:   - Continue Ceftriaxone IV while inpatient, PO flagyl with plan for cefdinir/flagyl on discharge.   - Oxygen walk test positive with need for home O2 - DME order and face-to-face completed   - Lasix 10mg IV x 1 today to see if this allows for weaning O2 requirement  - Follow up RUQ US     Concern for sepsis, likely secondary to aspiration pneumonia, improved  Leukocytosis   Presents with 4 days of chills, fatigue, productive cough and dyspnea. Apparently had temp of 100.7 at home. CXR without focal airspace consolidation. Noted UA with large LE and large WBC and RBCs, culture pending. Differential diagnosis includes likely UTI, though this would not account for her dyspnea. Consideration for viral myocarditis, though troponin WNL. RVP negative. Initial plan to treat UTI; growing ESBL klebsiella and aerococcus, however, ID consulted and felt more likely to aspiration pneumonia after CT chest with evidence of right sided opacity. Will plan to treat for aspiration pneumonia.   - MRSA nasal swab negative - Vancomycin discontinued   - RVP and COVID/flu negative   - Follow up blood culture 7/8 - NGTD   - Follow up urine culture -> ESBL Klebsiella and Aerococcus (felt to represent asymptomatic bacteruria)  - Narrow to ceftriaxone; stop cefepime 7/9, stop Ceftriaxone 7/9  - Started  "Ertapenem on 7/9; stopped 7/10  - Restart ceftriaxone on 7/10; flagyl PO per ID recommendation -> transition to orals on discharge (Cefdinir/flagyl prescribed and should be at discharge pharmacy)     Hypoxic respiratory failure, secondary to pneumonia, improving   Expiratory wheezing, improved   Concern for aspiration  Severe dilation of the thoracic esophagus  Troponin WNL, low concern for anginal equivalent without chest pain. Negative respiratory viral panel. No consolidation on CXR, though bacterial pneumonia remains in the differential. Clot remains on the differential though less likely given she is on DOAC, though per son, she refuses this about once per week and may not be therapeutic. D dimer mildly elevated but within normal range when adjusted for age. TTE without evidence of heart failure. Opted for CT chest on 7/10 given ongoing mild hypoxia revealing R sided consolidation, consistent with aspiration.   - PRN xopenex   - TTE on 7/8 with LVEF of 55-60%, moderate TVI, normal RV function  - Negative urine legionella (not on atypical coverage currently)  - Follow up non-contrast CT chest -> \"Multifocal consolidative opacities in the dependent right upper, middle & lower lobes, superimposed on diffuse interlobular septal thickening & bronchovascular consolidations. Likely  represents multifocal aspiration pneumonitis versus pneumonia with reactive pulmonary edema.\"  - Spirometry and encourage mobilization (up to chair as able)   - Antibiotic plan per above.   - Regarding aspiration risk, she is found to have dilated esophagus as likely contributor to ongoing aspiration risk. She is tolerating PO including liquids without clinical evidence of aspiration. Discussed with family at the bedside, we could pursue SLP but I would not recommend this as we will be unlikely to be able to address underlying cause without invasive measures. Given her age, recommendation for treatment of aspiration pneumonia and close " clinical monitoring for recurrence with PCP with ongoing GOC discussions. The pleasure provided by oral intake and she has a good appetite; doubt NPO or adjusted intake status would be within her GOC.   - Lasix 10mg IV x 1     Asymptomatic bacteruria   See discussion above. Urine culture with ESBL Klebsiella and Aerococcus. No urinary symptoms and felt to represent dirty catch, not true infection per ID.   - Stop ertapenam on 7/10    Atrial fibrillation   Hx CVA   EKG apparently completed but not uploaded. Rate controlled. Per son, the patient does intermittently decline DOAC (about once per week) and may not be therapeutic on this. Discussed risk vs benefit of ongoing AC could be discussed with PCP outpatient of concern for pill burden, age, fall risk etc.    - PTA metoprolol succinate 25mg PO daily    - PTA apixaban     OA: Holding PTA fosamax, continue calcium/D          Diet: Combination Diet Regular Diet Adult    DVT Prophylaxis: DOAC  Snyder Catheter: Not present  Lines: None     Cardiac Monitoring: None  Code Status: No CPR- Do NOT Intubate      Clinically Significant Risk Factors               # Hypoalbuminemia: Lowest albumin = 3.4 g/dL at 7/8/2025  1:31 PM, will monitor as appropriate                    # Financial/Environmental Concerns:           Social Drivers of Health    Tobacco Use: Medium Risk (7/8/2025)    Patient History     Smoking Tobacco Use: Former     Smokeless Tobacco Use: Never   Physical Activity: Not on File (4/6/2022)    Received from [facility]    Physical Activity     Physical Activity: 0   Stress: Not on File (4/6/2022)    Received from [facility]    Stress     Stress: 0   Social Connections: Not on File (9/13/2024)    Received from [facility]    Social Connections     Connectedness: 0          Disposition Plan     Medically Ready for Discharge: Anticipated Tomorrow             Inés Silveira DO  Hospitalist Service, GOLD TEAM 22  [facility]  Securely message with [employer] (more info)  Text page via [facility] Paging/Directory   See signed in provider for up to date coverage information  ______________________________________________________________________    Interval History   No acute events overnight. She continues to feel well. No dyspnea, ongoing intermittent cough. No reports of pain.     Physical Exam   Vital Signs: Temp: 98.3  F (36.8  C) Temp src: Oral BP: 128/63 Pulse: 79   Resp: 16 SpO2: 94 % O2 Device: Nasal cannula Oxygen Delivery: 1/2 LPM  Weight: 177 lbs 14.58 oz    General: elderly female, hard of hearing, no acute distress. Family at the bedside.   CV: Extremities are warm and well perfused.   LUNGS: no increased WOB.   ABD: Soft, NT/ND, NBS, no suprapubic tenderness.   SKIN: Warm, well perfused. No skin rashes or abnormal lesions.  MSK: No deformities. No clubbing, cyanosis, or edema.  NEURO: hard of hearing, answering most questions appropriately, No focal deficits.       Medical Decision Making           Data     I have personally reviewed the following data over the past 24 hrs:    13.4 (H)  \   13.1   / 351     135 100 13.7 /  96   4.4 26 0.61 \     ALT: 33 AST: 44 AP: 190 (H) TBILI: 0.4   ALB: 2.7 (L) TOT PROTEIN: 6.3 (L) LIPASE: N/A       Imaging results reviewed over the past 24 hrs:   No results found for this or any previous visit (from the past 24 hours).

## 2025-07-11 NOTE — PROGRESS NOTES
[facility] GENERAL INFECTIOUS DISEASE PROGRESS NOTE     Patient:  Hailey Alexis   Date of birth 2/10/1930, Medical record number [id number]  Date of Visit:  07/11/2025  Date of Admission: 7/8/2025  Consult Requester:Inés Silveira DO          Assessment and Plan:   ID Problem List  Multifocal aspiration pneumonia  Asymptomatic bacteruria (+ESBL Klebsiella and Aerococcus) in setting of dirty catch UA  Remote penicillin allergy  Rash not requiring hospitalization >20 years ago. Unclear how immediate the rash was. She doesn't remember many of the specifics but is quite sure she had no face/tongue swelling or difficulty breathing nor admission. Tolerated cefpodoxime challenge 4/14/24 and multiple cephalosporins + ertapenem in 2025.  History of infected cat bite c/b septic tenosynovitis of 4th extensor, small abscess, no surgical intervention. Completed 3 weeks ceftriaxone --> cefpodoxime + flagyl in 04/2024     RECOMMENDATION:  Continue IV ceftriaxone 2 g daily and PO metronidazole 500 mg q8 hrs for aspiration pneumonia  Total duration - 7 days (7/9 - 7/15)  On discharge, change to oral regimen - PO cefdinir 300 mg q12 hrs + flagyl as above  Recommend aspiration precautions in setting of severe thoracic esophageal dilation noted on CT.   Further workup of esophageal dilation per primary team/PCP.   Encouraged incentive spirometer use.   Sit upright after meals/snacks for 1-2 hrs. Lay on incline.  Pending hepatic US given abnormal perihepatic area noted on CT chest - completed today, result pending. Will peripherally follow this for results. Call sooner if questions for ID.    ID will sign off     ASSESSMENT:  Hailey Alexis is a very pleasant 89 year old female with PMHx significant for CVA and Afib on Eliquis who was admitted 7/8 after four days of fatigue, lethargy, chills, wet nonproductive cough, dyspnea and Tmax 100.7F at home, found with aspiration pneumonia. Met sepsis criteria on  admission - though afebrile here, had significant leukocytosis to 25, , tachycardia, and new hypoxia requiring 1-2L NC.      Viral and urine antigen workup negative. CXR with low lung volumes, no focal consolidation, though CT chest 7/10 notable for multifocal consolidative opacities in dependent lobes on R side with prominent mediastinal LAD and diffuse interlobular septal thickening + bronchovascular consolidations c/f multifocal aspiration pneumonitis versus pneumonia with reactive pulmonary edema. Additionally, CT noted severe dilation of thoracic esophagus likely the etiology of her aspiration if underlying achalasia/hiatal hernia/other etiology for this dilation. CT also noted a possible hypoattenuating area perihepatically which may be colon vs fluid collection - agree with radiology recommendation for hepatic US to further characterize, though no RUQ complaints. This is pending. She has since been weaned to 1/2 L NC and feeling better. Also found with positive urine culture -  growth of 50-100K CFU ESBL Klebsiella pneumonia and Aerococcus. UA abnormal with 4 squamous cells is dirty catch, >182 WBC and RBCs, negative nitrite. She has no urinary symptoms and this would be asymptomatic bacteruria for which no treatment is indicated.      She has been on multiple antibiotics this admission - initially on vancomycin + cefepime --> ceftriaxone --> ertapenem based on urine culture. As urine culture does not require treatment, do not need to cover for ESBL. Would resume ceftriaxone + add flagyl for anaerobic coverage of aspiration pneumonia (though not always required) while inpatient. Continue for total 7 day course for aspiration pneumonia as above.    Recommendations discussed with primary team.    Floresita Gomez PA-C  Infectious Diseases  Contact via [employer] or [employer] Paging/Directory    40 MINUTES SPENT BY ME on the date of service doing chart review, history, exam, documentation & further activities per  the note.           Interim History and Events:     Feels better today - more alert, interactive. Good appetite. Still coughing overnight/when laying down. Denies chest pain. Son at bedside. Discussed CT results. Did get up and use the bathroom - walked with a walker and breathing felt good. Still requiring 1/2 L NC - drops to mid-upper 80s.          Antimicrobials     Current:   Ceftriaxone 7/9 - present  Metronidazole 7/10 - present    Previous:  Ertapenem 7/9  Cefepime 7/8 - 7/9  Vancomycin 7/8         ROS:   -Focused 5 point ROS completed, pertinent positives and negatives listed above.      Physical Examination:  Temp: 96.8  F (36  C) Temp src: Axillary BP: 123/68 Pulse: 79   Resp: 18 SpO2: (!) 91 % O2 Device: Nasal cannula Oxygen Delivery: 1/2 LPM    Vitals:    07/09/25 0229 07/10/25 0845   Weight: 80.9 kg (178 lb 5.6 oz) 80.7 kg (177 lb 14.6 oz)       Constitutional: Pleasant elderly adult female seen sitting up in bed, in NAD. Alert and interactive. Hard of hearing.  HEENT: NCAT, anicteric sclerae, conjunctiva clear. Moist mucous membranes without lesions or thrush. Dentition intact/well cared for.  Respiratory: Non-labored breathing on 1/2 L NC, good air exchange. Lungs are clear to auscultation apically, fine crackles noted basilarly. Nonproductive cough noted.   Cardiovascular: Regular rate and rhythm with no murmur, rub or gallop.  GI: Normoactive BS. Abdomen is soft, nontender to palpation, non-distended. No rigidity or guarding. No rebound tenderness. No suprapubic tenderness.   Skin: Warm and dry. No rashes or lesions on exposed surfaces.  Musculoskeletal: Extremities grossly normal. No tenderness or edema present.   Neurologic: A &O x3, speech normal, answering questions appropriately. Moves all extremities spontaneously. Grossly non-focal.  Neuropsychiatric: Mentation and affect normal/appropriate.  VAD: PIV is c/d/i with no erythema, drainage, or tenderness.      Medications:  Current  "Facility-Administered Medications   Medication Dose Route Frequency Provider Last Rate Last Admin    apixaban ANTICOAGULANT (ELIQUIS) tablet 2.5 mg  2.5 mg Oral BID Pancho Jones MD   2.5 mg at 07/11/25 0832    calcium carbonate-vitamin D (CALTRATE) 600-10 MG-MCG per tablet 2 tablet  2 tablet Oral Daily Inés Silveira, DO   2 tablet at 07/11/25 0831    cefTRIAXone (ROCEPHIN) 2 g vial to attach to  ml bag for ADULTS or NS 50 ml bag for PEDS  2 g Intravenous Q24H LoInés moses E, DO   2 g at 07/10/25 1509    metoprolol succinate ER (TOPROL XL) 24 hr tablet 25 mg  25 mg Oral Daily Pancho Jones MD   25 mg at 07/11/25 0832    metroNIDAZOLE (FLAGYL) tablet 500 mg  500 mg Oral TID Inés Silveira, DO   500 mg at 07/11/25 0832    multivitamin  with lutein (OCUVITE WITH LUTEIN) per capsule 1 capsule  1 capsule Oral Daily Inés Silveira, DO   1 capsule at 07/11/25 0832    sodium chloride (PF) 0.9% PF flush 3 mL  3 mL Intracatheter Q8H Pancho Jones MD   3 mL at 07/10/25 1406       Infusions/Drips:  Current Facility-Administered Medications   Medication Dose Route Frequency Provider Last Rate Last Admin       Laboratory Data:   No results found for: \"ACD4\"    Inflammatory Markers    No lab results found.    Metabolic Studies       Recent Labs   Lab Test 07/11/25  0714 07/10/25  0905 07/09/25  0813 07/08/25  1555 07/08/25  1419 07/08/25  1331 05/19/25  1555 04/16/24  0656    136 137  --   --  135 139 138   POTASSIUM 4.4 4.2 4.0  --   --  5.0 4.8 4.9   CHLORIDE 100 100 103  --   --  99 101 105   CO2 26 24 24  --   --  24 27 25   ANIONGAP 9 12 10  --   --  12 11 8   BUN 13.7 14.7 19.1  --   --  24.5* 24.1* 20.4   CR 0.61 0.64 0.67  --   --  0.76 0.76 0.82   GFRESTIMATED 82 81 80  --   --  72 72 66   GLC 96 95 91  --   --  121* 91 88   CASSIE 8.6* 8.4* 8.3*  --   --  9.5 9.8 9.1   PHOS  --   --  2.8 2.8  --   --   --   --    MAG  --   --  1.9 2.0  --   --   --   --    LACT  --   --   --   --  " "1.7  --   --   --        Hepatic Studies    Recent Labs   Lab Test 07/11/25  0714 07/08/25  1331 04/11/24  1551   BILITOTAL 0.4 1.4* 0.8   ALKPHOS 190* 221* 141   ALBUMIN 2.7* 3.4* 4.0   AST 44  --  30   ALT 33  --  17     Hematology Studies      Recent Labs   Lab Test 07/11/25  0714 07/10/25  0905 07/09/25  0813 07/08/25  1331 05/19/25  1555 04/18/24  0950 04/15/24  1615 04/13/24  0946 04/12/24  0710 04/11/24  1551   WBC 13.4* 14.7* 19.0* 25.0* 10.3 8.3   < > 11.3*   < > 16.4*   ANEU 9.5* 11.0*  --  20.6* 6.5  --   --  8.1  --  12.7*   ALYM 1.7 1.4  --  1.6 2.3  --   --  1.3  --  1.8   ELMIRA 1.6* 1.7*  --  2.5* 1.1  --   --  1.3  --  1.6*   AEOS 0.4 0.5  --  0.1 0.3  --   --  0.4  --  0.2   HGB 13.1 13.1 13.0 15.7 15.6 14.7   < > 13.5   < > 15.3   HCT 39.4 39.2 38.9 47.6* 48.9* 43.4   < > 40.9   < > 46.8    343 278 312 264 347   < > 231   < > 262    < > = values in this interval not displayed.       Urine Studies     Recent Labs   Lab Test 07/08/25  1540   URINEPH 5.5   NITRITE Negative   LEUKEST Large*   WBCU >182*     Microbiology:  Culture   Date Value Ref Range Status   07/08/2025 No growth after 2 days  Preliminary   07/08/2025 No growth after 2 days  Preliminary   07/08/2025 50,000-100,000 CFU/mL Klebsiella pneumoniae ESBL (A)  Final   07/08/2025 50,000-100,000 CFU/mL Aerococcus urinae (A)  Final     Comment:     Aerococcus species is usually susceptible to penicillin, cephalosporins, tetracycline and vancomycin.     Last check of C difficile  No results found for: \"CDBPCT\"    Imaging:  CT Chest w/o Contrast  Result Date: 7/10/2025  IMPRESSION: 1.  Multifocal consolidative opacities in the dependent juxtapleural right upper, middle & lower lobes with prominent mediastinal lymphadenopathy is superimposed on diffuse interlobular septal thickening & bronchovascular consolidations. Likely represents multifocal aspiration pneumonitis versus pneumonia with reactive pulmonary edema. 2.  Indeterminant anterior " perihepatic hypoattenuating area, may represent interposition of the colon versus perihepatic collection. Recommend dedicated hepatic ultrasound for further characterization. The indeterminate peripherally calcified focus within the left lobe of the liver can be evaluated at that time as well. 3.  Severe dilation of the thoracic esophagus. When patient condition allows nonemergent evaluation with a outpatient double contrast barium esophagram may be obtained for further clarification. 4.  Cardiomegaly with biatrial enlargement & severe atherosclerotic calcifications. 5.  Large calcified intraluminal plaque in the proximal abdominal aorta. No evidence of abdominal aortic aneurysm. 6.  Bronchiectasis and bronchial wall thickening.      Echo Complete  Result Date: 7/9/2025   Interpretation Summary Global and regional left ventricular function is normal with an EF of 55-60%. Right ventricular function, chamber size, wall motion, and thickness are normal. Moderate to severe tricuspid insufficiency is present. Moderate (pulmonary artery systolic pressure 50-75mmHg) pulmonary hypertension is present. Sinuses of Valsalva 4 cm. Aortic root dilatation is present. IVC diameter <2.1 cm collapsing >50% with sniff suggests a normal RA pressure of 3 mmHg. No pericardial effusion is present. There is no prior study for direct comparison.      XR Chest 2 Views  Result Date: 7/8/2025  Impression: Low lung volumes with atelectasis. No focal airspace consolidation.

## 2025-07-11 NOTE — INTERIM SUMMARY
Patient has been assessed for Home Oxygen needs. Oxygen readings:    See Home Oxygen Assessment by RN on 7/11/25.     Oxygen Documentation  I certify that this patient, Hailey Alexis has been under my care (or a nurse practitioner or physican's assistant working with me). This is the face-to-face encounter for oxygen medical necessity.      At the time of this encounter, I have reviewed the qualifying testing and have determined that supplemental oxygen is reasonable and necessary and is expected to improve the patient's condition in a home setting.         Patient has continued oxygen desaturation due to Pneumonia J18.9.    If portability is ordered, is the patient mobile within the home? yes    Was this visit performed as a telehealth visit: No    Inés Silveira, DO

## 2025-07-11 NOTE — PLAN OF CARE
Goal Outcome Evaluation:  VS: /68 (BP Location: Right arm)   Pulse 79   Temp 96.8  F (36  C) (Axillary)   Resp 18   Wt 80.7 kg (177 lb 14.6 oz)   SpO2 (!) 91%   BMI 31.02 kg/m      O2: Sating around 90s on 0.5 L    Neuro: Disoriented to time and situation   Bowel/Bladder: Incontinent of bladder, primofit in place   LBM: yesterday   Diet: Regular diet, tray set up   Skin: Dry intact   Pain: Denies pain   Activity: Ax1 with GB and walker   Dressings: none   LDA: Left PIV SL   Equipment: Walker, personal belongings   Plan: IV abx, antifungal, O2 therapy, safety, IS   Additional Info: [facility] contacted regarding pt's need for new home oxygen supply. RN to contact [facility] on day of discharge for O2 supply to be delivered. If pt's still here on Monday, [facility] will need RN to complete a new walk test to see if pt still qualifies for home oxygen.    Possible discharge tomorrow       Riley Lema RN on 7/11/2025

## 2025-07-11 NOTE — CARE PLAN
07/11/25 1348   Home Oxygen Assessment (RN/RT ONLY)   Does patient have oxygen at home? No   1. SpO2 on room air at rest while awake 89%       Oxygen LPM at rest 0.5   3. SpO2 on room air during Activity/with exercise 85%   4. SpO2 with oxygen during activity/with exercise 92%       Oxygen LPM during activity/with exercise 5L   Does patient qualify for Home O2? Yes

## 2025-07-12 ENCOUNTER — DOCUMENTATION ONLY (OUTPATIENT)
Dept: HOME HEALTH SERVICES | Facility: CLINIC | Age: OVER 89
End: 2025-07-12
Payer: COMMERCIAL

## 2025-07-12 VITALS
RESPIRATION RATE: 18 BRPM | WEIGHT: 177.91 LBS | OXYGEN SATURATION: 92 % | SYSTOLIC BLOOD PRESSURE: 123 MMHG | DIASTOLIC BLOOD PRESSURE: 82 MMHG | HEART RATE: 80 BPM | TEMPERATURE: 98 F | BODY MASS INDEX: 31.02 KG/M2

## 2025-07-12 LAB
ANION GAP SERPL CALCULATED.3IONS-SCNC: 11 MMOL/L (ref 7–15)
BUN SERPL-MCNC: 14.2 MG/DL (ref 8–23)
CALCIUM SERPL-MCNC: 8.7 MG/DL (ref 8.8–10.4)
CHLORIDE SERPL-SCNC: 99 MMOL/L (ref 98–107)
CREAT SERPL-MCNC: 0.65 MG/DL (ref 0.51–0.95)
EGFRCR SERPLBLD CKD-EPI 2021: 81 ML/MIN/1.73M2
ERYTHROCYTE [DISTWIDTH] IN BLOOD BY AUTOMATED COUNT: 14.2 % (ref 10–15)
GLUCOSE SERPL-MCNC: 89 MG/DL (ref 70–99)
HCO3 SERPL-SCNC: 25 MMOL/L (ref 22–29)
HCT VFR BLD AUTO: 41.5 % (ref 35–47)
HGB BLD-MCNC: 14 G/DL (ref 11.7–15.7)
MAGNESIUM SERPL-MCNC: 2 MG/DL (ref 1.7–2.3)
MCH RBC QN AUTO: 30.4 PG (ref 26.5–33)
MCHC RBC AUTO-ENTMCNC: 33.7 G/DL (ref 31.5–36.5)
MCV RBC AUTO: 90 FL (ref 78–100)
PLATELET # BLD AUTO: 379 10E3/UL (ref 150–450)
POTASSIUM SERPL-SCNC: 4.2 MMOL/L (ref 3.4–5.3)
RBC # BLD AUTO: 4.61 10E6/UL (ref 3.8–5.2)
SODIUM SERPL-SCNC: 135 MMOL/L (ref 135–145)
WBC # BLD AUTO: 12.7 10E3/UL (ref 4–11)

## 2025-07-12 PROCEDURE — 99239 HOSP IP/OBS DSCHRG MGMT >30: CPT | Performed by: STUDENT IN AN ORGANIZED HEALTH CARE EDUCATION/TRAINING PROGRAM

## 2025-07-12 PROCEDURE — 85018 HEMOGLOBIN: CPT | Performed by: STUDENT IN AN ORGANIZED HEALTH CARE EDUCATION/TRAINING PROGRAM

## 2025-07-12 PROCEDURE — 250N000013 HC RX MED GY IP 250 OP 250 PS 637: Performed by: STUDENT IN AN ORGANIZED HEALTH CARE EDUCATION/TRAINING PROGRAM

## 2025-07-12 PROCEDURE — 80048 BASIC METABOLIC PNL TOTAL CA: CPT | Performed by: STUDENT IN AN ORGANIZED HEALTH CARE EDUCATION/TRAINING PROGRAM

## 2025-07-12 PROCEDURE — 36415 COLL VENOUS BLD VENIPUNCTURE: CPT | Performed by: STUDENT IN AN ORGANIZED HEALTH CARE EDUCATION/TRAINING PROGRAM

## 2025-07-12 PROCEDURE — 83735 ASSAY OF MAGNESIUM: CPT | Performed by: STUDENT IN AN ORGANIZED HEALTH CARE EDUCATION/TRAINING PROGRAM

## 2025-07-12 PROCEDURE — 250N000013 HC RX MED GY IP 250 OP 250 PS 637: Performed by: HOSPITALIST

## 2025-07-12 RX ORDER — FUROSEMIDE 20 MG/1
20 TABLET ORAL DAILY PRN
Qty: 30 TABLET | Refills: 0 | Status: SHIPPED | OUTPATIENT
Start: 2025-07-12

## 2025-07-12 RX ADMIN — APIXABAN 2.5 MG: 2.5 TABLET, FILM COATED ORAL at 08:04

## 2025-07-12 RX ADMIN — CALCIUM CARBONATE 600 MG (1,500 MG)-VITAMIN D3 400 UNIT TABLET 2 TABLET: at 08:04

## 2025-07-12 RX ADMIN — METRONIDAZOLE 500 MG: 500 TABLET ORAL at 08:04

## 2025-07-12 RX ADMIN — Medication 1 CAPSULE: at 08:04

## 2025-07-12 RX ADMIN — METOPROLOL SUCCINATE 25 MG: 25 TABLET, EXTENDED RELEASE ORAL at 08:04

## 2025-07-12 ASSESSMENT — ACTIVITIES OF DAILY LIVING (ADL)
ADLS_ACUITY_SCORE: 60

## 2025-07-12 NOTE — PLAN OF CARE
Physical Therapy Discharge Summary    Reason for therapy discharge:    Discharged to home.    Progress towards therapy goal(s). See goals on Care Plan in [facility] electronic health record for goal details.  Goals partially met.  Barriers to achieving goals:   discharge from facility.    Therapy recommendation(s):    No further therapy is recommended.

## 2025-07-12 NOTE — PLAN OF CARE
Goal Outcome Evaluation:      Plan of Care Reviewed With: patient    Overall Patient Progress: no changeOverall Patient Progress: no change  Shift [id number]  No acute event overnight. Pt is AxO3, except time and with intermittent confusion. [place]. Family at bedside. Pt takes NC on and off overnight. Education provided of its importance. Saturating >90% with NC with humidifier at 0.5L. IS when awake. No pain reported. Primofit on place. Not oob overnight. No other medical concern reported. Call light within reach and bed alarm on. POC ongoing.

## 2025-07-12 NOTE — PROGRESS NOTES
Received intake call for home oxygen at 7:56AM. Reviewed patient's chart; Patient qualifies under insurance guidelines and all documentation is in the chart including a good order.     8:12AM- Spoke with care coordinator, TOSIN, confirmed we received the order, and provided them with ETA of oxygen.     8:15AM- Called to offer choice.  Spoke with Agus, patient's son.  He is okay with [facility] setting them up. Discussed equipment with patient and informed them that we would be to bedside with oxygen in the next 4 hours.     We will deliver a transport tank and a 10L concentrator to bedside and portability (filling station and tanks) on Monday.

## 2025-07-12 NOTE — DISCHARGE SUMMARY
[facility]  Hospitalist Discharge Summary      Date of Admission:  7/8/2025  Date of Discharge:  7/12/2025  Discharging Provider: Inés Silveira DO  Discharge Service: Hospitalist Service, GOLD TEAM 22      Clinically Significant Risk Factors          Follow-ups Needed After Discharge   Follow-up Appointments       Hospital Follow-up with Existing Primary Care Provider (PCP)          Schedule Primary Care visit within: 7 Days               Unresulted Labs Ordered in the Past 30 Days of this Admission       Date and Time Order Name Status Description    7/8/2025  3:28 PM Blood Culture Peripheral blood (BC) Hand, Right Preliminary     7/8/2025  3:18 PM Blood Culture Peripheral blood (BC) Arm, Right Preliminary         These results will be followed up by hospitalist group.     Discharge Disposition   Discharged to home  Condition at discharge: Stable    Hospital Course   Hailey Alexis is a 89 year old female admitted on 7/8/2025. She has a history of CVA, atrial fibrillation on Eliquis, osteoarthritis of right knee, osteoporosis and is admitted for hypoxia and concern for sepsis, found to have likely aspiration pneumonia in the setting of dilated thoracic esophagus on imaging. Noted urine culture with ESBL Klebsiella, felt to not represent true infection per ID and stopped carbapenem. Unable to wean O2 completing this admission and she is still requiring 0.5-1L at rest and up to 5L with activity. Given dose of IV Lasix on 7/11 with good output by history, hopeful to wean further O2 with gentle diuresis and ongoing treatment of her known pneumonia. DME O2 order placed and this was provided on discharge, in addition to Xopenex. She was transitioned from IV Ceftriaxone and PO Flagyl to Cefdinir/Flagyl on discharge with plan for 7 day course (end 7/15). Provided limited supply of oral Lasix additionally to be given if increasing O2 needs or increase in body  weight. She has a history of PCN G allergy, received cephalosporin and carbapenem this admission without clinical evidence of allergic reaction. Per ID recommendation, we performed RUQ US after question of phillip-hepatic fluid on CT. This revealed non-specific perihepatic fluid with recommendation for follow up imaging. Alk phos mildly elevated but downtrending and no RUQ pain or tenderness.     Discussed with the patient's son, Agus, who has been present at the bedside throughout admission that given concern for aspiration event and evidence of dilated esophagus, she is high risk for recurrent aspiration. Given she is clinically stable, I recommended against pursuit of formal SLP evaluation or EGD. Noted further workup of this issue in a 89 year old is unlikely to be useful and would likely result in the recommendation for diet adjustment or NPO status and is likely not without her GOC. Advised they follow closely with her PCP for ongoing GOC discussion and planning if/when recurrent aspiration event. Similarly, further workup of RUQ fluid collection on US with repeat imaging can be discussed with her PCP.     Concern for sepsis, likely secondary to aspiration pneumonia, improved  Leukocytosis, improving   Presented with 4 days of chills, fatigue, productive cough and dyspnea. Apparently had temp of 100.7 at home. CXR without focal airspace consolidation. Noted UA with large LE and large WBC and RBCs. Initial concern for PNA vs UTI.  RVP negative. Initial plan to treat UTI; growing ESBL klebsiella and aerococcus, however, ID consulted and felt more likely to aspiration pneumonia after CT chest with evidence of right sided opacity. Clinically improved but still requiring supplemental O2 on discharge.   - MRSA nasal swab negative  - RVP and COVID/flu negative   - Follow up blood culture 7/8 - NGTD   - Follow up urine culture -> ESBL Klebsiella and Aerococcus (felt to represent asymptomatic bacteruria)  - Narrowed to  "Ceftriaxone/Flagyl -> transitioned to Cefdinir/Flagyl on discharge with plan for 7 day course (end 7/15)  - PCP follow up     Hypoxic respiratory failure, secondary to pneumonia, improved but with ongoing O2 requirement  Expiratory wheezing, improved   Concern for aspiration  Severe dilation of the thoracic esophagus  Troponin WNL, low concern for anginal equivalent without chest pain. Negative respiratory viral panel. No consolidation on CXR, though bacterial pneumonia remains in the differential. Clot remains on the differential though less likely given she is on DOAC, though per son, she refuses this about once per week and may not be therapeutic. D dimer mildly elevated but within normal range when adjusted for age. TTE without evidence of heart failure. Opted for CT chest on 7/10 given ongoing mild hypoxia revealing R sided consolidation, consistent with aspiration.   - PRN xopenex - provided on discharge  - TTE on 7/8 with LVEF of 55-60%, moderate TVI, normal RV function  - Negative urine legionella (not on atypical coverage currently)  - Follow up non-contrast CT chest -> \"Multifocal consolidative opacities in the dependent right upper, middle & lower lobes, superimposed on diffuse interlobular septal thickening & bronchovascular consolidations. Likely  represents multifocal aspiration pneumonitis versus pneumonia with reactive pulmonary edema.\"  - Antibiotic plan per above.   - Regarding aspiration risk, she is found to have dilated esophagus as likely contributor to ongoing aspiration risk. She is tolerating PO including liquids without clinical evidence of aspiration. Discussed with family at the bedside, we could pursue SLP but I would not recommend this as we will be unlikely to be able to address underlying cause without invasive measures. Given her age, recommendation for treatment of aspiration pneumonia and close clinical monitoring for recurrence with PCP with ongoing GOC discussions. The pleasure " "provided by oral intake and she has a good appetite; doubt NPO or adjusted intake status would be within her GOC.   - Lasix 20mg PO daily PRN provided on discharge   - Supplemental O2 provided on discharge  - Close PCP follow up for recheck      Asymptomatic bacteruria   See discussion above. Urine culture with ESBL Klebsiella and Aerococcus. No urinary symptoms and felt to represent dirty catch, not true infection per ID.   - Stopped ertapenam on 7/10      RUQ fluid collection  RUQ US on 7/11 with \" adjacent to the left lobe of the liver is a marginated predominantly hypoechoic echogenicity which may represent complex fluid collection measuring up to 6.7 cm. Consider follow-up to resolution.  - outpatient PCP follow up for repeat imaging if they wish to pursue this.     Atrial fibrillation   Hx CVA   EKG apparently completed but not uploaded. Rate controlled. Per son, the patient does intermittently decline DOAC (about once per week) and may not be therapeutic on this. Discussed risk vs benefit of ongoing AC could be discussed with PCP outpatient of concern for pill burden, age, fall risk etc.    - PTA metoprolol succinate 25mg PO daily    - PTA apixaban     OA: PTA fosamax, continue calcium/D      Consultations This Hospital Stay   PHARMACY TO DOSE VANCO  CONSULT FOR INPATIENT VASCULAR ACCESS CARE  PHYSICAL THERAPY ADULT IP CONSULT  OCCUPATIONAL THERAPY ADULT IP CONSULT  CONSULT FOR INPATIENT VASCULAR ACCESS CARE  CONSULT FOR INPATIENT VASCULAR ACCESS CARE  INFECTIOUS DISEASE [facility] ADULT IP CONSULT    Code Status   No CPR- Do NOT Intubate    Time Spent on this Encounter   I, Inés Silveira DO, personally saw the patient today and spent greater than 30 minutes discharging this patient.       Inés Silveira DO  [facility] [address]  [address] [place] [id number]  Phone: [phone]  Fax: [phone]  ______________________________________________________________________    Physical Exam "   Vital Signs: Temp: 98  F (36.7  C) Temp src: Oral BP: 123/82 Pulse: 80   Resp: 18 SpO2: 92 % O2 Device: Nasal cannula    Weight: 177 lbs 14.58 oz  General: elderly female, hard of hearing, no acute distress. Family at the bedside.   CV: Irregularly irregular, regular rate. No  murmur appreciated. Extremities are warm and well perfused.   LUNGS: no increased WOB. Clear to auscultation anteriorly, no wheezing.   ABD: Soft, NT/ND, NBS, no suprapubic tenderness. No RUQ tenderness.   SKIN: Warm, well perfused. No skin rashes or abnormal lesions.  MSK: No deformities. No clubbing, cyanosis, or edema.  NEURO: hard of hearing, answering most questions appropriately, No focal deficits.          Primary Care Physician   Gabe Etienne    Discharge Orders      Reason for your hospital stay    You were admitted to the hospital for workup and management of sepsis and were found to have a right sided pneumonia. This was most likely due to aspiration as you were also found to have esophageal dilation on CT imaging. At this time, we will treat your pneumonia and have you follow up with your doctor. It is likely the aspiration will recur and you may develop a recurrent pneumonia in the future. It will be important to talk with your primary doctor about this and plans for management if this does recur. There was a question about urinary tract infection. I had the infectious disease team see you here and they do not feel this represents a true infection and we have opted to prioritize treatment of your pneumonia.     Given your age, I do not recommend formal speech language pathology evaluation at this time. You may consider this as an outpatient. It is important to note, however, if SLP has concern about your swallowing, they may recommend you not take food by mouth and use alternative method of feeding which would significantly impact your quality of life. This can be discussed further with goals of care conversations with  your doctor. Similarly, further workup of your esophageal dilation would require invasive testing and is unlikely to be within your goals of care. Again, this can be discussed with your primary doctor and you can come up with a plan moving forward that incorporates your goals and values.     Activity    Your activity upon discharge: activity as tolerated     Oxygen Adult/Peds    Oxygen Documentation  I certify that this patient, Hailey Alexis has been under my care (or a nurse practitioner or physican's assistant working with me). This is the face-to-face encounter for oxygen medical necessity.      At the time of this encounter, I have reviewed the qualifying testing and have determined that supplemental oxygen is reasonable and necessary and is expected to improve the patient's condition in a home setting.         Patient has continued oxygen desaturation due to Pneumonia J18.9.    If portability is ordered, is the patient mobile within the home? yes    Was this visit performed as a telehealth visit: No     Oxygen Order    Oxygen Documentation  I certify that this patient, Hailey Alexis has been under my care (or a nurse practitioner or physican's assistant working with me). This is the face-to-face encounter for oxygen medical necessity.      At the time of this encounter, I have reviewed the qualifying testing and have determined that supplemental oxygen is reasonable and necessary and is expected to improve the patient's condition in a home setting.         Patient has continued oxygen desaturation due to Pneumonia J18.9.    If portability is ordered, is the patient mobile within the home? yes    Was this visit performed as a telehealth visit: No     Other Respiratory Supplies for DME - ONLY FOR DME     Diet    Follow this diet upon discharge: Current Diet:Orders Placed This Encounter      Combination Diet Regular Diet Adult     Hospital Follow-up with Existing Primary Care Provider (PCP)             Significant Results and Procedures   Most Recent 3 CBC's:  Recent Labs   Lab Test 07/12/25  0759 07/11/25  0714 07/10/25  0905   WBC 12.7* 13.4* 14.7*   HGB 14.0 13.1 13.1   MCV 90 91 92    351 343     Most Recent 3 BMP's:  Recent Labs   Lab Test 07/12/25  0759 07/11/25  0714 07/10/25  0905    135 136   POTASSIUM 4.2 4.4 4.2   CHLORIDE 99 100 100   CO2 25 26 24   BUN 14.2 13.7 14.7   CR 0.65 0.61 0.64   ANIONGAP 11 9 12   CASSIE 8.7* 8.6* 8.4*   GLC 89 96 95     Most Recent 2 LFT's:  Recent Labs   Lab Test 07/11/25  0714 07/08/25  1331 04/11/24  1551   AST 44  --  30   ALT 33  --  17   ALKPHOS 190* 221* 141   BILITOTAL 0.4 1.4* 0.8   ,   Results for orders placed or performed during the hospital encounter of 07/08/25   XR Chest 2 Views    Narrative    Exam: XR CHEST 2 VIEWS, 7/8/2025 3:14 PM    Comparison: None    History: cough, fatigue    Findings:  AP and lateral views of the upright chest. Trachea is midline.  Calcifications of the aortic knob. Cardiomediastinal silhouette is  within normal limits. Bilateral perihilar and bibasilar streaky  opacities, in the setting of low lung volumes. No pneumothorax or  pleural effusion. The visualized upper abdomen is unremarkable. No  acute osseous abnormalities.      Impression    Impression:   Low lung volumes with atelectasis. No focal airspace consolidation.    I have personally reviewed the examination and initial interpretation  and I agree with the findings.    INA CADENA MD         SYSTEM ID:  [id number]   CT Chest w/o Contrast    Narrative    EXAM: CT CHEST W/O CONTRAST 7/10/2025 8:40 AM     DEMOGRAPHICS: Female of 89 years    INDICATION: Hypoxia, evaluate for edema vs consolidation    COMPARISON: Chest x-ray 7/8/2023 5    TECHNIQUE: Helical CT imaging of the chest was obtained without  contrast. Multiplanar post-processed and MIP reformats were obtained  reviewed.     FINDINGS:    LINES/TUBES: None.    LUNG: Dependent interlobular  septal thickening, bronchovascular  consolidations & made of juxtapleural consolidative opacity within  the right upper lobe (series 4 image 60), with loculated fluid in the  adjacent superior aspect of the right major fissure. Additional juxta  pleural consolidations in the dependent right lower lobe (series 4  image 99). Patchy ground glass/consolidative opacities in the left  lung apex (series 4 image 19), & juxtapleural right middle lobe  (series 4 image 1:15). No focal consolidation. No suspicious/worrisome  pulmonary nodules.    LARGE AIRWAYS: Bronchiectasis with bronchial wall thickening. No  endobronchial secretions or mucous plugging.    PLEURA: No pneumothorax. Small right pleural effusion No pleural mass  or calcification.    VESSELS: Normal size/configuration of the great vessels.    HEART: Cardiomegaly. Biatrial dilation. No pericardial effusion. Mild  coronary atherosclerotic calcifications. Dilation of the main  pulmonary artery which measures 4.1 cm. Trace mitral annular  calcifications. Opacifications of the aortic annulus. Limited  evaluation of valves secondary to lack of contrast.     MEDIASTINUM AND KEVON: Dilated patulous esophagus with retained  secretions. Confluent adenopathy in the prevascular, pretracheal   subcarinal mediastinum    CHEST WALL: Unremarkable.    LOWER NECK: Heterogeneous thyroid without discrete nodule.    UPPER ABDOMEN: Colonic diverticulosis. Indeterminate calcification  within the left hepatic lobe (series 2 image 46) Perihepatic  hypoattenuating focus along the right anterior liver margin measuring  3.5 x 1.3 cm (series 2 image 51). Intraluminal calcification measuring  1.0 x 0.8 cm in the context of severe calcified atherosclerosis  (series 2 image 51). Limited evaluation of the upper abdomen due to  lack of contrast administration.    BONES: Mild thoracic spine degenerative changes. No acute osseous  abnormality. No suspicious bony lesions. Unremarkable soft tissues.       Impression    IMPRESSION:   1.  Multifocal consolidative opacities in the dependent   juxtapleural right upper, middle & lower lobes with prominent  mediastinal lymphadenopathy is superimposed on diffuse interlobular  septal thickening & bronchovascular consolidations. Likely  represents multifocal aspiration pneumonitis versus pneumonia with  reactive pulmonary edema.  2.  Indeterminant anterior perihepatic hypoattenuating area, may  represent interposition of the colon versus perihepatic collection.  Recommend dedicated hepatic ultrasound for further characterization.  The indeterminate peripherally calcified focus within the left lobe of  the liver can be evaluated at that time as well.  3.  Severe dilation of the thoracic esophagus. When patient condition  allows nonemergent evaluation with a outpatient double contrast barium  esophagram may be obtained for further clarification.  4.  Cardiomegaly with biatrial enlargement & severe atherosclerotic  calcifications.  5.  Large calcified intraluminal plaque in the proximal abdominal  aorta. No evidence of abdominal aortic aneurysm.   6.  Bronchiectasis and bronchial wall thickening.    I have personally reviewed the examination and initial interpretation  and I agree with the findings.    INA CADENA MD         SYSTEM ID:  [id number]   US Abdomen Limited    Narrative    EXAMINATION: Limited Abdominal Ultrasound, 7/11/2025 9:06 AM     COMPARISON: The CT chest 7/10/2025    HISTORY: Evaluation for perihepatic fluid collection seen on CT - NOT  evaluating gallbladder or biliary tract (no indication for NPO)    FINDINGS:     Fluid: Partially visualized right pleural effusion. No ascites.    Liver: Measures 15.0 cm. Limited visualization was suboptimal acoustic  window. Increased hepatic echogenicity. Adjacent to the left lobe of  the liver there is a heterogenous/complex fluid collection without  internal vascularity that measures 3.5 x 6.7 x 1.9 cm. Within the  left  lobe liver there is a 1.8 x 1.6 x 0.9 cm echogenic focus with  posterior shadowing and no internal vascularity corresponds to  calcification on CT. The portal vein is patent with antegrade flow.    Gallbladder: No wall thickening, pericholecystic fluid, reported  sonographic Mayer's sign. Cholelithiasis, conglomerate of stones  versus one large stone measures 1.7 x 1.8 cm.    No intrahepatic biliary ductal dilatation demonstrated. Visualized  common bile duct measure   2 mm  in diameter.    Pancreas: Visualized portions of the head and body of the pancreas are  unremarkable.     Kidney: The right kidney measures 9.9 cm long. No hydronephrosis.  Simple appearing cyst measuring 3.1 x 2.6 x 2.0 cm,.      Impression    IMPRESSION:     1.  Adjacent to the left lobe of the liver is a marginated  predominantly hypoechoic echogenicity which may represent complex  fluid collection measuring up to 6.7 cm. Consider follow-up to  resolution.  2.   Cholelithiasis without sonographic evidence of acute  cholecystitis.  3.  Increased hepatic echogenicity which may be seen with hepatic  parenchymal disease including steatosis.   4.  Partially visualized right pleural effusion.    I have personally reviewed the examination and initial interpretation  and I agree with the findings.    SULTANA GONZALEZ MD         SYSTEM ID:  [id number]   Echo Limited *Canceled*    Narrative    The following orders were created for panel order Echo Limited.  Procedure                               Abnormality         Status                     ---------                               -----------         ------                       Please view results for these tests on the individual orders.   Echo Limited *Canceled*    Narrative    The following orders were created for panel order Echo Limited.  Procedure                               Abnormality         Status                     ---------                               -----------         ------                        Please view results for these tests on the individual orders.   Echo Complete *Canceled*    Narrative    The following orders were created for panel order Echo Complete.  Procedure                               Abnormality         Status                     ---------                               -----------         ------                       Please view results for these tests on the individual orders.   Echo Complete *Canceled*    Narrative    The following orders were created for panel order Echo Complete.  Procedure                               Abnormality         Status                     ---------                               -----------         ------                       Please view results for these tests on the individual orders.   Echo Complete     Value    LVEF  55-60%    Narrative    [id number]  [id number]  [id number]  [id number]^ROD DINH^FERNIE^     [facility]  Echocardiography [facility]  [address] [place] [id number]     Name: MANJEET MILLER  MRN: [id number]  : 02/10/1930  Study Date: 2025 10:59 AM  Age: 89 yrs  Gender: Female  Patient [facility]  Reason For Study: Atrial Fibrillation  Ordering Physician: FERNIE MOSLEY  Performed By: Olvin Luther RDCS     BSA: 1.8 m2  Height: 63 in  Weight: 178 lb  HR: 89  BP: 117/63 mmHg  ______________________________________________________________________________  Procedure  Echocardiogram with two-dimensional, color and spectral Doppler. Contrast  Optison. Technically difficult study. Optison (NDC #8845-2385-82) given  intravenously. Patient was given 6 ml mixture of 3 ml Optison and 6 ml saline.  3 ml wasted.  ______________________________________________________________________________  Interpretation Summary  Global and regional left ventricular function is normal with an EF of 55-60%.  Right ventricular function, chamber size, wall motion, and thickness  are  normal.  Moderate to severe tricuspid insufficiency is present.  Moderate (pulmonary artery systolic pressure 50-75mmHg) pulmonary hypertension  is present.  Sinuses of Valsalva 4 cm.  Aortic root dilatation is present.  IVC diameter <2.1 cm collapsing >50% with sniff suggests a normal RA pressure  of 3 mmHg.  No pericardial effusion is present.  There is no prior study for direct comparison.  ______________________________________________________________________________  Left Ventricle  Global and regional left ventricular function is normal with an EF of 55-60%.  Left ventricular wall thickness is normal. Left ventricular size is normal.  Diastolic function not assessed due to atrial fibrillation. No regional wall  motion abnormalities are seen.     Right Ventricle  Right ventricular function, chamber size, wall motion, and thickness are  normal.     Atria  The left atrium appears normal. Moderate to severe right atrial enlargement is  present. The atrial septum is intact as assessed by color Doppler .     Mitral Valve  The mitral valve is normal. Trace mitral insufficiency is present.     Aortic Valve  Aortic valve is normal in structure and function.     Tricuspid Valve  Moderate to severe tricuspid insufficiency is present. The right ventricular  systolic pressure is 56mmHg above the right atrial pressure. Moderate  (pulmonary artery systolic pressure 50-75mmHg) pulmonary hypertension is  present.     Pulmonic Valve  The pulmonic valve is normal. Trace pulmonic insufficiency is present.     Vessels  The pulmonary artery cannot be assessed. Sinuses of Valsalva 4 cm. Aortic root  dilatation is present. IVC diameter <2.1 cm collapsing >50% with sniff  suggests a normal RA pressure of 3 mmHg.     Pericardium  No pericardial effusion is present.     Compared to Previous Study  There is no prior study for direct comparison.  ______________________________________________________________________________  MMode/2D  Measurements & Calculations     IVSd: 0.79 cm  LVIDd: 3.9 cm  LVIDs: 2.6 cm  LVPWd: 1.1 cm  FS: 32.9 %  LV mass(C)d: 112.3 grams  LV mass(C)dI: 61.0 grams/m2  Ao root diam: 4.0 cm  asc Aorta Diam: 3.6 cm  LVOT diam: 2.0 cm  LVOT area: 3.1 cm2  Ao root diam index Ht(cm/m): 2.5  Ao root diam index BSA (cm/m2): 2.2  Asc Ao diam index BSA (cm/m2): 2.0  Asc Ao diam index Ht(cm/m): 2.2  LA Volume (BP): 51.5 ml     LA Volume Index (BP): 28.0 ml/m2  RWT: 0.55  TAPSE: 1.4 cm     Doppler Measurements & Calculations  PA acc time: 0.07 sec  TR max adelso: 373.0 cm/sec  TR max P.7 mmHg  RV S Adelso: 9.0 cm/sec     ______________________________________________________________________________  Report approved by: SHELLIE Owusu MD on 2025 01:58 PM             Discharge Medications      Review of your medicines        START taking        Dose / Directions   cefdinir 300 MG capsule  Commonly known as: OMNICEF  Used for: Sepsis with acute hypoxic respiratory failure without septic shock, due to unspecified organism (H)      Dose: 300 mg  Take 1 capsule (300 mg) by mouth 2 times daily for 4 days.  Quantity: 8 capsule  Refills: 0     furosemide 20 MG tablet  Commonly known as: LASIX  Used for: Aspiration pneumonia of right lung due to gastric secretions, unspecified part of lung (H)      Dose: 20 mg  Take 1 tablet (20 mg) by mouth daily as needed (Increasing oxygen requirements, leg swelling or >5 pound weight gain in 24 hours,).  Quantity: 30 tablet  Refills: 0     levalbuterol 45 MCG/ACT inhaler  Commonly known as: XOPENEX HFA  Used for: Sepsis with acute hypoxic respiratory failure without septic shock, due to unspecified organism (H), Aspiration pneumonia of right lung due to gastric secretions, unspecified part of lung (H)      Dose: 1-2 puff  Inhale 1-2 puffs into the lungs every 6 hours as needed for shortness of breath or wheezing.  Quantity: 15 g  Refills: 0     metroNIDAZOLE 500 MG tablet  Commonly known as:  FLAGYL  Indication: Pneumonia caused by Inhaling a Substance Into the Lungs  Used for: Sepsis with acute hypoxic respiratory failure without septic shock, due to unspecified organism (H)      Dose: 500 mg  Take 1 tablet (500 mg) by mouth 3 times daily for 4 days.  Quantity: 12 tablet  Refills: 0            CONTINUE these medicines which have NOT CHANGED        Dose / Directions   alendronate 70 MG tablet  Commonly known as: FOSAMAX  Used for: Age-related osteoporosis without current pathological fracture      Dose: 70 mg  Take 1 tablet (70 mg) by mouth every 7 days.  Quantity: 12 tablet  Refills: 2     Calcium Carbonate-Vitamin D 600-10 MG-MCG Tabs  Used for: Age-related osteoporosis without current pathological fracture      Dose: 2 tablet  Take 2 tablets by mouth daily.  Quantity: 180 tablet  Refills: 3     ELIQUIS ANTICOAGULANT 2.5 MG tablet  Used for: Chronic atrial fibrillation (H)  Generic drug: apixaban ANTICOAGULANT      Dose: 2.5 mg  Take 1 tablet (2.5 mg) by mouth 2 times daily.  Quantity: 60 tablet  Refills: 11     metoprolol succinate ER 25 MG 24 hr tablet  Commonly known as: TOPROL XL  Used for: Hx of atrial flutter      Dose: 25 mg  Take 1 tablet (25 mg) by mouth daily.  Quantity: 90 tablet  Refills: 3     PreserVision AREDS 2 Caps  Used for: Age-related osteoporosis without current pathological fracture      Dose: 1 capsule  Take 1 capsule by mouth daily.  Quantity: 90 capsule  Refills: 3               Where to get your medicines        These medications were sent to [facility] [place], [place] - [address]  [address] [address]      Phone: [phone]   cefdinir 300 MG capsule  furosemide 20 MG tablet  levalbuterol 45 MCG/ACT inhaler  metroNIDAZOLE 500 MG tablet       Allergies   Allergies   Allergen Reactions    Penicillin G Rash     Patient reports rash not requiring hospitalization in ~2000. Doesn't remember specifics but no tongue/throat swelling and  no difficulty breathing. Reviewed with her 4/14/24.  Tolerated cefpodoxime 4/14/24, ceftriaxone 4/15. Tolerated ceftriaxone, cefepime, and ertapenem in 07/2025

## 2025-07-12 NOTE — PLAN OF CARE
Goal Outcome Evaluation:    DISCHARGE SUMMARY    Pt discharging to: Home  Transportation: Sons  AVS given and discussed: Pt was given AVS and pt states understanding of content. Pt has no further questions.   Medications given: Yes, discussed. No further questions.   Belongings returned: Yes, ensured all belongings packed and sent with pt. No items in security.   Comments: Escorted safely to the south entrance. Pt left at 1215

## 2025-07-13 LAB
BACTERIA SPEC CULT: NO GROWTH
BACTERIA SPEC CULT: NO GROWTH

## 2025-07-16 ENCOUNTER — OFFICE VISIT (OUTPATIENT)
Dept: FAMILY MEDICINE | Facility: CLINIC | Age: OVER 89
End: 2025-07-16
Payer: COMMERCIAL

## 2025-07-16 VITALS
OXYGEN SATURATION: 96 % | SYSTOLIC BLOOD PRESSURE: 118 MMHG | DIASTOLIC BLOOD PRESSURE: 66 MMHG | HEART RATE: 75 BPM | TEMPERATURE: 97.7 F

## 2025-07-16 DIAGNOSIS — J69.0 ASPIRATION PNEUMONIA OF RIGHT LUNG DUE TO GASTRIC SECRETIONS, UNSPECIFIED PART OF LUNG (H): ICD-10-CM

## 2025-07-16 DIAGNOSIS — Z09 HOSPITAL DISCHARGE FOLLOW-UP: Primary | ICD-10-CM

## 2025-07-16 DIAGNOSIS — M62.81 GENERALIZED MUSCLE WEAKNESS: ICD-10-CM

## 2025-07-16 DIAGNOSIS — Z99.81 ON HOME OXYGEN THERAPY: ICD-10-CM
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
## 2025-07-16 NOTE — PROGRESS NOTES
Assessment & Plan     Hospital discharge follow-up  Aspiration pneumonia of right lung due to gastric secretions, unspecified part of lung (H)  On home oxygen therapy  Generalized muscle weakness  Overall some improvement from hospitalization, completed course of antibiotics this morning, and o2 needs are decreasing but still on 1.5 L. I discussed my concerns about traveling to [place] tomorrow given Jenn's weakness, oxygen requirement, etc - fall risk, aspiration risk - but she really wants to go to her son's wedding and plans on traveling with Agus, who will be with her the whole time.   She should follow-up with Dr. Etienne in 2 weeks for re-evaluation, also briefly discussed repeating RUQ US at some point but not urgent.   Discussed limited benefit of adjusted dietary restrictions would significantly reduce risk of future aspiration pneumonia and would negatively affect quality of life.   Would benefit from an in-depth GOC with PCP.   MED REC REQUIRED  Post Medication Reconciliation Status:  Discharge medications reconciled, continue medications without change      Follow-up  Return in about 2 weeks (around 7/30/2025) for follow up.    Subjective   Jenn is a 89 year old, presenting for the following health issues:  Hospital F/U    [facility]        Hospital Follow-up Visit:    Hospital/Nursing Home/IP Rehab Facility: [facility]  Most Recent Admission Date: 7/8/2025   Most Recent Admission Diagnosis: Acute cystitis without hematuria - N30.00  Sepsis with acute hypoxic respiratory failure without septic shock, due to unspecified organism (H) - A41.9, R65.20, J96.01     Most Recent Discharge Date: 7/12/2025   Most Recent Discharge Diagnosis: Sepsis with acute hypoxic respiratory failure without septic shock, due to unspecified organism (H) - A41.9, R65.20, J96.01  Acute cystitis without hematuria - N30.00  Aspiration pneumonia of right lung due to gastric secretions, unspecified  "part of lung (H) - J69.0  Atrial fibrillation, unspecified type (H) - I48.91  Long term (current) use of anticoagulants - Z79.01     Problems taking medications regularly:  None  Medication changes since discharge: None  Problems adhering to non-medication therapy:  None  Hasn't need furosemide since she left the hospital.   Finished antibiotics this morning.     Summary of hospitalization:  [facility] discharge summary reviewed  Diagnostic Tests/Treatments reviewed.  Follow up needed: consider repeat RUQ US: RUQ US on 7/11 with \"adjacent to the left lobe of the liver is a marginated predominantly hypoechoic echogenicity which may represent complex fluid collection measuring up to 6.7 cm. Consider follow-up to resolution.   Other Healthcare Providers Involved in Patient s Care:         Physical Therapy - [facility]  Update since discharge: stable.       Here with Agus and Dre, her sons.   On oxygen 1.5 l/m. Reduced down from 2 l/m.  At 88-89% without oxygen.   Specifically sons ask if she can fly tomorrow for a wedding. Thursday afternoon to Sunday afternoon.   Jenn is already packing for the trip and has gotten her hair and nails done. It's her son's wedding.     Plan of care communicated with patient and family               Objective    /66   Pulse 75   Temp 97.7  F (36.5  C)   SpO2 96%     Physical Exam   GENERAL: alert and no distress, in wheelchair  HENT: normal cephalic/atraumatic and extremely hard of hearing  RESP: lungs clear to auscultation - no rales, rhonchi or wheezes  CV: regular rate and rhythm, normal S1 S2, no S3 or S4, no murmur, click or rub, no peripheral edema  MS: no gross musculoskeletal defects noted, no edema    The longitudinal plan of care for the diagnosis(es)/condition(s) as documented were addressed during this visit. Due to the added complexity in care, I will continue to support Jenn in the subsequent management and with ongoing continuity of " care.    The total time (on the date of service) for this service was 31 minutes, including discussion/face-to-face, chart review, interpretation not otherwise reported, documentation, and updating of the computerized record.          Signed Electronically by: Evangelina Best MD

## 2025-07-16 NOTE — Clinical Note
I expressed my concerns about Jenn traveling to [place] tomorrow given her weakness, recovery from aspiration pneumonia, and current oxygen use.. but it seems like she is going despite my reservations. I asked them to follow-up with you in the next few weeks when she returns.

## 2025-07-17 ENCOUNTER — TELEPHONE (OUTPATIENT)
Dept: FAMILY MEDICINE | Facility: CLINIC | Age: OVER 89
End: 2025-07-17

## 2025-07-17 DIAGNOSIS — Z99.81 ON HOME OXYGEN THERAPY: ICD-10-CM

## 2025-07-17 DIAGNOSIS — M62.81 GENERALIZED MUSCLE WEAKNESS: ICD-10-CM

## 2025-07-17 DIAGNOSIS — J69.0 ASPIRATION PNEUMONIA OF RIGHT LUNG DUE TO GASTRIC SECRETIONS, UNSPECIFIED PART OF LUNG (H): Primary | ICD-10-CM

## 2025-07-17 DIAGNOSIS — R06.02 SHORTNESS OF BREATH: ICD-10-CM

## 2025-07-22 DIAGNOSIS — M81.0 AGE-RELATED OSTEOPOROSIS WITHOUT CURRENT PATHOLOGICAL FRACTURE: ICD-10-CM

## 2025-07-23 NOTE — TELEPHONE ENCOUNTER
Medication requested: Calcium Carbonate-Vitamin D 600-10 MG-MCG TABS   Last office visit: 7/16/25  [facility] appointments: none  Medication last refilled: 9/25/24; 180 + 3 refills  Last qualifying labs: N/A    Routing refill request to provider for review/approval because:  Drug not on the [facility] refill protocol     Cesar WILCOX, RN  07/23/25 4:02 PM

## 2025-07-30 ENCOUNTER — OFFICE VISIT (OUTPATIENT)
Dept: FAMILY MEDICINE | Facility: CLINIC | Age: OVER 89
End: 2025-07-30
Payer: COMMERCIAL

## 2025-07-30 VITALS
SYSTOLIC BLOOD PRESSURE: 110 MMHG | OXYGEN SATURATION: 95 % | DIASTOLIC BLOOD PRESSURE: 76 MMHG | TEMPERATURE: 97.8 F | HEART RATE: 96 BPM

## 2025-07-30 DIAGNOSIS — R05.1 ACUTE COUGH: Primary | ICD-10-CM

## 2025-07-30 PROBLEM — I51.7 CARDIOMEGALY: Status: ACTIVE | Noted: 2025-07-30

## 2025-07-30 LAB
BACTERIA SPT CULT: NORMAL
BASO+EOS+MONOS # BLD AUTO: 1.4 10E3/UL (ref 0–2.2)
BASO+EOS+MONOS NFR BLD AUTO: 19 %
ERYTHROCYTE [DISTWIDTH] IN BLOOD BY AUTOMATED COUNT: 15.1 % (ref 10–15)
GRAM STAIN RESULT: NORMAL
HCT VFR BLD AUTO: 47.9 % (ref 35–47)
HGB BLD-MCNC: 15.5 G/DL (ref 11.7–15.7)
LYMPHOCYTES # BLD AUTO: 1.3 10E3/UL (ref 0.8–5.3)
LYMPHOCYTES NFR BLD AUTO: 17 %
MCH RBC QN AUTO: 30.3 PG (ref 26.5–33)
MCHC RBC AUTO-ENTMCNC: 32.4 G/DL (ref 31.5–36.5)
MCV RBC AUTO: 94 FL (ref 78–100)
NEUTROPHILS # BLD AUTO: 4.7 10E3/UL (ref 1.6–8.3)
NEUTROPHILS NFR BLD AUTO: 64 %
PLATELET # BLD AUTO: 245 10E3/UL (ref 150–450)
RBC # BLD AUTO: 5.12 10E6/UL (ref 3.8–5.2)
WBC # BLD AUTO: 7.4 10E3/UL (ref 4–11)

## 2025-07-30 NOTE — PROGRESS NOTES
[facility] [address], [address]  [facility] [address]  Phone: [phone]  Fax: [phone]    Patient:  Hailey Alexis 2/10/1930  Date of Visit:  12:26 PM  Referring Provider Referred Self      Assessment & Plan      ***      Krista Tomlinson MD       Hailey Alexis is a 89 year old female with hx of mild cognitive impairment, osteoarthritis, CVA, atrial fibrillation, hearing loss, osteoporosis, macular degeneration, tricuspid valve regurgitation. She is here with her son, for the following issues:      Cough  Recently hospitalized (7/8-7/12) for sepsis, hypoxic respiratory failure due to aspiration pneumonia of R lung  Chest CT 7/10/25   Follow up in clinic with Dr. Best 7/16  At that time was still oxygen dependent, feeling weak but wanted to travel to [facility] for her son's wedding.  She was advised not to go, but ended up traveling  Home on Cefdinir 300mg bid  Large dilated esophagus, likely to recur  Today  Cough returned yesterday  2L oxygen  Felt better until yesterday  No fever or chills  Eating fine  No shortness of breath  Feeling sleepy a lot, worse in past couple of days  Home care worried she was having recurrent pnemonia  Son, Agus with URI symptoms 4-5 d, back cold  Not short of breath, saw him one week ago,   Off oxygen O2 sat high 88%     Patient Active Problem List   Diagnosis    Osteoarthritis of right knee, unspecified osteoarthritis type    Cerebrovascular accident (H)    Chronic atrial fibrillation (H)    Hard of hearing    Osteoporosis    Acute cystitis without hematuria    Sepsis with acute hypoxic respiratory failure without septic shock, due to unspecified organism (H)    Atrial flutter (H)    Intermediate stage nonexudative age-related macular degeneration of both eyes    Nonrheumatic tricuspid valve regurgitation       Current Outpatient Medications   Medication Sig Dispense Refill    alendronate (FOSAMAX)  70 MG tablet Take 1 tablet (70 mg) by mouth every 7 days. 12 tablet 2    Calcium Carbonate-Vitamin D 600-10 MG-MCG TABS Take 2 tablets by mouth daily. 180 tablet 3    ELIQUIS ANTICOAGULANT 2.5 MG tablet Take 1 tablet (2.5 mg) by mouth 2 times daily. 60 tablet 11    furosemide (LASIX) 20 MG tablet Take 1 tablet (20 mg) by mouth daily as needed (Increasing oxygen requirements, leg swelling or >5 pound weight gain in 24 hours,). 30 tablet 0    levalbuterol (XOPENEX HFA) 45 MCG/ACT inhaler Inhale 1-2 puffs into the lungs every 6 hours as needed for shortness of breath or wheezing. 15 g 0    metoprolol succinate ER (TOPROL XL) 25 MG 24 hr tablet Take 1 tablet (25 mg) by mouth daily. 90 tablet 3    Multiple Vitamins-Minerals (PRESERVISION AREDS 2) CAPS Take 1 capsule by mouth daily. 90 capsule 3       Allergies   Allergen Reactions    Penicillin G Rash     Patient reports rash not requiring hospitalization in ~2000. Doesn't remember specifics but no tongue/throat swelling and no difficulty breathing. Reviewed with her 4/14/24.  Tolerated cefpodoxime 4/14/24, ceftriaxone 4/15. Tolerated ceftriaxone, cefepime, and ertapenem in 07/2025        EXAM  There were no vitals taken for this visit.  {:[id number]}   age-related macular degeneration of both eyes    Nonrheumatic tricuspid valve regurgitation       Current Outpatient Medications   Medication Sig Dispense Refill    alendronate (FOSAMAX) 70 MG tablet Take 1 tablet (70 mg) by mouth every 7 days. 12 tablet 2    Calcium Carbonate-Vitamin D 600-10 MG-MCG TABS Take 2 tablets by mouth daily. 180 tablet 3    ELIQUIS ANTICOAGULANT 2.5 MG tablet Take 1 tablet (2.5 mg) by mouth 2 times daily. 60 tablet 11    furosemide (LASIX) 20 MG tablet Take 1 tablet (20 mg) by mouth daily as needed (Increasing oxygen requirements, leg swelling or >5 pound weight gain in 24 hours,). 30 tablet 0    levalbuterol (XOPENEX HFA) 45 MCG/ACT inhaler Inhale 1-2 puffs into the lungs every 6 hours as needed for shortness of breath or wheezing. 15 g 0    metoprolol succinate ER (TOPROL XL) 25 MG 24 hr tablet Take 1 tablet (25 mg) by mouth daily. 90 tablet 3    Multiple Vitamins-Minerals (PRESERVISION AREDS 2) CAPS Take 1 capsule by mouth daily. 90 capsule 3       Allergies   Allergen Reactions    Penicillin G Rash     Patient reports rash not requiring hospitalization in ~2000. Doesn't remember specifics but no tongue/throat swelling and no difficulty breathing. Reviewed with her 4/14/24.  Tolerated cefpodoxime 4/14/24, ceftriaxone 4/15. Tolerated ceftriaxone, cefepime, and ertapenem in 07/2025        EXAM  /76 (BP Location: Right arm, Patient Position: Sitting, Cuff Size: Adult Regular)   Pulse 96   Temp 97.8  F (36.6  C) (Oral)   SpO2 95%   Gen:alert, hard of hearing, no distress, wearing NC O2   CARDIOVASCULAR: Irreg irreg S1,S2  LUNGS: faint, crackles right lung, no wheezes, no retractions  EXTREMITIES: trace pedal edema

## 2025-07-30 NOTE — NURSING NOTE
89 year old    Chief Complaint   Patient presents with    Cough     Jenn traveled about a week and a half ago and she has been fine since yesterday. Her PCP said that yesterday she was very weak yesterday.    She had trouble walking yesterday from the bed to the bathroom as well.        Blood pressure 110/76, pulse 96, temperature 97.8  F (36.6  C), temperature source Oral, SpO2 95%. There is no height or weight on file to calculate BMI.    Patient Active Problem List   Diagnosis    Osteoarthritis of right knee, unspecified osteoarthritis type    Cerebrovascular accident (H)    Chronic atrial fibrillation (H)    Hard of hearing    Osteoporosis    Acute cystitis without hematuria    Sepsis with acute hypoxic respiratory failure without septic shock, due to unspecified organism (H)    Atrial flutter (H)    Intermediate stage nonexudative age-related macular degeneration of both eyes    Nonrheumatic tricuspid valve regurgitation    Cardiomegaly          Wt Readings from Last 2 Encounters:   07/10/25 80.7 kg (177 lb 14.6 oz)   05/19/25 79.8 kg (176 lb)       BP Readings from Last 3 Encounters:   07/30/25 110/76   07/16/25 118/66   07/12/25 123/82             Current Outpatient Medications   Medication Sig Dispense Refill    alendronate (FOSAMAX) 70 MG tablet Take 1 tablet (70 mg) by mouth every 7 days. 12 tablet 2    Calcium Carbonate-Vitamin D 600-10 MG-MCG TABS Take 2 tablets by mouth daily. 180 tablet 3    ELIQUIS ANTICOAGULANT 2.5 MG tablet Take 1 tablet (2.5 mg) by mouth 2 times daily. 60 tablet 11    furosemide (LASIX) 20 MG tablet Take 1 tablet (20 mg) by mouth daily as needed (Increasing oxygen requirements, leg swelling or >5 pound weight gain in 24 hours,). 30 tablet 0    levalbuterol (XOPENEX HFA) 45 MCG/ACT inhaler Inhale 1-2 puffs into the lungs every 6 hours as needed for shortness of breath or wheezing. 15 g 0    metoprolol succinate ER (TOPROL XL) 25 MG 24 hr tablet Take 1 tablet (25 mg) by mouth daily.  "90 tablet 3    Multiple Vitamins-Minerals (PRESERVISION AREDS 2) CAPS Take 1 capsule by mouth daily. 90 capsule 3     No current facility-administered medications for this visit.          Social History     Tobacco Use    Smoking status: Former     Types: Cigarettes    Smokeless tobacco: Never   Substance Use Topics    Alcohol use: Yes     Alcohol/week: 1.0 standard drink of alcohol     Types: 1 Standard drinks or equivalent per week          Health Maintenance Due   Topic Date Due    COVID-19 VACCINE (8 - 2024-25 season) 05/11/2025        No results found for: \"PAP\"        July 30, 2025 1:14 PM   "

## 2025-07-30 NOTE — Clinical Note
Katy please review note. Will you please contact sonDre weekly to check on oxygen needs. Goal is oxygen sat >94%. May gradually wean oxygen weekly for trial. Currently at 2L/min. Could try 1.5L/min. if oxygen sat drops to less than 94%, resume 2 L until passing trial.  Thanks, Krista Tomlinson MD Internal Medicine/Pediatrics

## 2025-08-04 NOTE — PROGRESS NOTES
Called Agus (Son), who reports Jenn has been needing the 2 L of oxygen still to keep her oxygen saturation (Sat's%) between 92-95%. Agus also reports when Jenn lays down in bed, they drops to 88% and they will bump her up to 3 L.  I explained to Agus that the goal is to wean her off of the oxygen.  Agus understands, but states that Jenn is still coughing a lot and now has a cold on top of everything.  I told him to keep on eye on things as the post-nasal drip could cause further lung concern if she's aspirating at all.  I will call Agus again next week.  ELIZABETH LeeN, RN, [facility]  RN Care Coordinator  [facility]  08/04/25  10:02 AM  Phone: [phone]

## 2025-08-06 ENCOUNTER — NURSE TRIAGE (OUTPATIENT)
Dept: NURSING | Facility: CLINIC | Age: OVER 89
End: 2025-08-06
Payer: COMMERCIAL

## 2025-08-17 ENCOUNTER — NURSE TRIAGE (OUTPATIENT)
Dept: NURSING | Facility: CLINIC | Age: OVER 89
End: 2025-08-17
Payer: COMMERCIAL

## 2025-08-18 ENCOUNTER — TELEPHONE (OUTPATIENT)
Dept: FAMILY MEDICINE | Facility: CLINIC | Age: OVER 89
End: 2025-08-18

## 2025-08-20 ENCOUNTER — TELEPHONE (OUTPATIENT)
Dept: FAMILY MEDICINE | Facility: CLINIC | Age: OVER 89
End: 2025-08-20

## 2025-08-20 ENCOUNTER — TELEPHONE (OUTPATIENT)
Dept: OBGYN | Facility: CLINIC | Age: OVER 89
End: 2025-08-20
Payer: COMMERCIAL

## (undated) RX ORDER — TRIAMCINOLONE ACETONIDE 40 MG/ML
INJECTION, SUSPENSION INTRA-ARTICULAR; INTRAMUSCULAR
Status: DISPENSED
Start: 2018-06-05

## (undated) RX ORDER — LIDOCAINE HYDROCHLORIDE 10 MG/ML
INJECTION, SOLUTION EPIDURAL; INFILTRATION; INTRACAUDAL; PERINEURAL
Status: DISPENSED
Start: 2018-06-05